# Patient Record
Sex: FEMALE | Race: BLACK OR AFRICAN AMERICAN | Employment: FULL TIME | URBAN - METROPOLITAN AREA
[De-identification: names, ages, dates, MRNs, and addresses within clinical notes are randomized per-mention and may not be internally consistent; named-entity substitution may affect disease eponyms.]

---

## 2017-01-17 ENCOUNTER — TELEPHONE (OUTPATIENT)
Dept: ONCOLOGY | Age: 22
End: 2017-01-17

## 2017-01-17 NOTE — TELEPHONE ENCOUNTER
Phone call placed to pt to remind pt to have labs drawn prior to her follow up appointment with . Pt verbalized understanding. Zoran Baker

## 2017-01-27 ENCOUNTER — OFFICE VISIT (OUTPATIENT)
Dept: ONCOLOGY | Age: 22
End: 2017-01-27

## 2017-01-27 VITALS
OXYGEN SATURATION: 98 % | HEIGHT: 61 IN | BODY MASS INDEX: 23.22 KG/M2 | HEART RATE: 101 BPM | SYSTOLIC BLOOD PRESSURE: 117 MMHG | TEMPERATURE: 97.8 F | RESPIRATION RATE: 20 BRPM | WEIGHT: 123 LBS | DIASTOLIC BLOOD PRESSURE: 70 MMHG

## 2017-01-27 DIAGNOSIS — D50.8 OTHER IRON DEFICIENCY ANEMIA: Primary | ICD-10-CM

## 2017-01-27 NOTE — PROGRESS NOTES
[address] [facility] at [address] [place] [address]  [phone]    Hematology / Oncology Followup    Reason for Visit:   Brennon Hernandez is a 24 y.o. female who is seen for follow up of anemia. History of Present Illness:   She reports continued fatigue, worsening since last here. Still with heavy periods, not taking her OCPs. Not able to tolerate oral iron. PAST HISTORY: The following sections were reviewed and updated in the EMR as appropriate: PMH, SH, FH, Medications, Allergies. Allergies   Allergen Reactions    Amoxicillin Rash     Skin peeling      Review of Systems: A complete review of systems was obtained, reviewed, and scanned into the EMR. Pertinent findings reviewed above. Physical Exam:     Visit Vitals    /70 (BP 1 Location: Left arm, BP Patient Position: Sitting)    Pulse (!) 101    Temp 97.8 °F (36.6 °C) (Temporal)    Resp 20    Ht 5' 1\" (1.549 m)    Wt 123 lb (55.8 kg)    SpO2 98%    BMI 23.24 kg/m2     General: No distress  Eyes: PERRLA, anicteric sclerae  HENT: Atraumatic, OP clear  Neck: Supple  Lymphatic: No cervical, supraclavicular, or inguinal adenopathy  Respiratory: CTAB, normal respiratory effort  CV: Normal rate, regular rhythm, no murmurs, no peripheral edema  GI: Soft, nontender, nondistended, no masses, no hepatomegaly, no splenomegaly  MS: Normal gait and station. Digits without clubbing or cyanosis. Skin: No rashes, ecchymoses, or petechiae. Normal temperature, turgor, and texture. Psych: Alert, oriented, appropriate affect, normal judgment/insight    Results:     Lab Results   Component Value Date/Time    WBC 4.0 09/27/2016 04:29 PM    HGB 12.9 09/27/2016 04:29 PM    HCT 41.2 09/27/2016 04:29 PM    PLATELET 537 18/84/0023 04:29 PM    MCV 69 09/27/2016 04:29 PM    ABS.  NEUTROPHILS 1.7 09/27/2016 04:29 PM     Lab Results   Component Value Date/Time    Sodium 141 07/05/2016 04:36 PM    Potassium 4.6 07/05/2016 04:36 PM    Chloride 103 07/05/2016 04:36 PM    CO2 24 07/05/2016 04:36 PM    Glucose 91 07/05/2016 04:36 PM    BUN 8 07/05/2016 04:36 PM    Creatinine 0.64 07/05/2016 04:36 PM    GFR est  07/05/2016 04:36 PM    GFR est non- 07/05/2016 04:36 PM    Calcium 9.5 07/05/2016 04:36 PM    Glucose (POC) 108 12/15/2013 07:26 AM     Lab Results   Component Value Date/Time    Bilirubin, total <0.2 07/05/2016 04:36 PM    ALT 10 07/05/2016 04:36 PM    AST 13 07/05/2016 04:36 PM    Alk. phosphatase 56 07/05/2016 04:36 PM    Protein, total 7.5 07/05/2016 04:36 PM    Albumin 4.0 07/05/2016 04:36 PM    Globulin 3.7 11/11/2014 12:25 PM     Lab Results   Component Value Date/Time    Reticulocyte count 0.7 09/27/2016 04:29 PM    Iron % saturation 50 09/27/2016 04:29 PM    Iron 125 09/27/2016 04:29 PM    TIBC 249 09/27/2016 04:29 PM    Ferritin 317 09/27/2016 04:29 PM    Haptoglobin 99 09/27/2016 04:29 PM     09/27/2016 04:29 PM    Sed rate (ESR) 59 10/20/2011 11:40 AM    TSH 0.919 07/05/2016 04:36 PM    Lipase 153 11/11/2014 12:25 PM     Lab Results   Component Value Date/Time    INR 1.1 12/16/2013 04:00 AM    aPTT 30.3 12/16/2013 04:00 AM    Factor VIII Activity 139 07/29/2016 12:17 PM    von Willebrand Factor (vWF) Ag 104 07/29/2016 12:17 PM    vWF Activity 71 07/29/2016 12:17 PM     Ferritin   Date Value   09/27/2016 317 ng/mL (H)   08/22/2016 565 ng/mL (H)   07/29/2016 7 NG/ML (L)       9/27/2016:  Hemoglobin fractionation: normal    1/25/2017:  HGB: 13.4  WBC: 5.7  PLT: 260  MCV: 71  Iron saturation: 31%  Ferritin: 79    US pelvis, US transvaginal 7/13/2016: normal      Assessment:   1) Anemia, iron deficiency  Resolved s/p IV iron. However, her microcytosis persists. Hemoglobin fractionation is negative for beta thal.  I suspect alpha thal trait, but at this point there is no indication for treatment an no need to send genetic testing to confirm. Smear is pending. She cannot tolerate oral iron.   Her iron deficiency is likely to recur.  Check labs again in 6-12 months. 2) Menorhagia  Improved with OCPs, but worsening now off this medication. VWF panel was negative. Consider resuming OCPs    3) Dizziness, fatigue  Symptoms persists. Not due to anemia, which has resolved. I encouraged her to follow up with her PCP to discuss other possible causes, she has an appointment next week.     Plan:     · Smear pending  · Labs in 6-12 months: CBC, iron profile, ferritin ([facility])  · Return to see me in 6-12 months      Signed By: Paxton Champion MD     January 27, 2017

## 2017-01-27 NOTE — MR AVS SNAPSHOT
Visit Information Date & Time Provider Department Dept. Phone Encounter #  
 1/27/2017  1:30 PM Emilia Moreira MD [address] at [address] [id number] Follow-up Instructions Return for Kaz iron deficiency fu. Your Appointments 1/30/2017  3:30 PM  
COMPLETE PHYSICAL with Augusto Echevarria NP [address] [facility] ([facility]) Appt Note: [address] [address] [phone]  
  
   
 [address] [address]  
  
    
 7/21/2017  1:30 PM  
ESTABLISHED PATIENT with Emilia Moreira MD  
[facility] at [facility]) Appt Note: f/U  
 [address] [place] [address]  
[phone]  
  
   
 [address] [facility] Upcoming Health Maintenance Date Due  
 HPV AGE 9Y-34Y (2 of 3 - Female 3 Dose Series) 12/9/2011 DTaP/Tdap/Td series (1 - Tdap) 6/14/2016 PAP AKA CERVICAL CYTOLOGY 6/14/2016 INFLUENZA AGE 9 TO ADULT 8/1/2016 Allergies as of 1/27/2017  Review Complete On: 1/27/2017 By: Sheila Crocker LPN Severity Noted Reaction Type Reactions Amoxicillin  11/02/2011    Rash Skin peeling Current Immunizations  Reviewed on 8/11/2016 Name Date Human Papillomavirus 10/14/2011 Meningococcal Vaccine 9/21/2010 Not reviewed this visit You Were Diagnosed With   
  
 Codes Comments Other iron deficiency anemia    -  Primary ICD-10-CM: D50.8 Vitals BP Pulse Temp Resp Height(growth percentile) 117/70 (BP 1 Location: Left arm, BP Patient Position: Sitting) (!) 101 97.8 °F (36.6 °C) (Temporal) 20 5' 1\" (1.549 m) Weight(growth percentile) SpO2 BMI OB Status Smoking Status 123 lb (55.8 kg) 98% 23.24 kg/m2 Having regular periods Never Smoker BMI and BSA Data  Body Mass Index Body Surface Area  
 23.24 kg/m 2 1.55 m 2  
  
  
 Preferred Pharmacy Pharmacy Name Phone [facility] [address], [address] [place] [address] Surendra Parisi [phone] Your Updated Medication List  
  
   
This list is accurate as of: 1/27/17  2:37 PM.  Always use your most recent med list.  
  
  
  
  
 diclofenac EC 75 mg EC tablet Commonly known as:  VOLTAREN Take 1 Tab by mouth two (2) times a day. ergocalciferol 50,000 unit capsule Commonly known as:  ERGOCALCIFEROL Take 1 Cap by mouth every seven (7) days. norgestimate-ethinyl estradiol 0.25-35 mg-mcg Tab Commonly known as:  Celina  Take 1 Tab by mouth daily. We Performed the Following CBC W/O DIFF [17782 CPT(R)] FERRITIN [36130 CPT(R)] IRON PROFILE [id number] CPT(R)] Follow-up Instructions Return for Kaz iron deficiency fu. Introducing [facility]! Dear Meena Nguyễn: Thank you for requesting a [email or web address] account. Our records indicate that you already have an active [email or web address] account. You can access your account anytime at [email or web address] Did you know that you can access your hospital and ER discharge instructions at any time in [email or web address]? You can also review all of your test results from your hospital stay or ER visit. Additional Information If you have questions, please visit the Frequently Asked Questions section of the [email or web address] website at [email or web address] Remember, [email or web address] is NOT to be used for urgent needs. For medical emergencies, dial 911. Now available from your iPhone and Android! Please provide this summary of care documentation to your next provider. Your primary care clinician is listed as KM PATE. If you have any questions after today's visit, please call [phone].

## 2017-02-10 ENCOUNTER — OFFICE VISIT (OUTPATIENT)
Dept: FAMILY MEDICINE CLINIC | Age: 22
End: 2017-02-10

## 2017-02-10 VITALS
TEMPERATURE: 99 F | SYSTOLIC BLOOD PRESSURE: 119 MMHG | HEART RATE: 106 BPM | HEIGHT: 61 IN | RESPIRATION RATE: 18 BRPM | WEIGHT: 122 LBS | BODY MASS INDEX: 23.03 KG/M2 | OXYGEN SATURATION: 99 % | DIASTOLIC BLOOD PRESSURE: 84 MMHG

## 2017-02-10 DIAGNOSIS — Z13.29 SCREENING FOR THYROID DISORDER: ICD-10-CM

## 2017-02-10 DIAGNOSIS — Z98.890 S/P MVR (MITRAL VALVE REPAIR): ICD-10-CM

## 2017-02-10 DIAGNOSIS — Z00.00 ANNUAL PHYSICAL EXAM: Primary | ICD-10-CM

## 2017-02-10 DIAGNOSIS — F41.1 GAD (GENERALIZED ANXIETY DISORDER): ICD-10-CM

## 2017-02-10 RX ORDER — ESCITALOPRAM OXALATE 10 MG/1
10 TABLET ORAL DAILY
Qty: 30 TAB | Refills: 1 | Status: SHIPPED | OUTPATIENT
Start: 2017-02-10 | End: 2017-04-11 | Stop reason: SDUPTHER

## 2017-02-10 RX ORDER — CLONAZEPAM 0.5 MG/1
0.5 TABLET ORAL
Qty: 15 TAB | Refills: 0 | Status: SHIPPED | OUTPATIENT
Start: 2017-02-10 | End: 2018-11-29

## 2017-02-10 NOTE — PROGRESS NOTES
Chief Complaint   Patient presents with    Physical    Labs     Patient in office today for physical and labs; pt states she was taken to [facility] on Tuesday for SOB and chest pain; was dx with anxiety attack. Xray and EKG was done came back normal.    1. Have you been to the ER, urgent care clinic since your last visit? Hospitalized since your last visit?see note    2. Have you seen or consulted any other health care providers outside of the [address] [place] [address] since your last visit? Include any pap smears or colon screening.  No

## 2017-02-10 NOTE — MR AVS SNAPSHOT
Visit Information Date & Time Provider Department Dept. Phone Encounter #  
 2/10/2017  3:30 PM Ariadna [place] NP [address] [facility] [phone] [id number] Follow-up Instructions Return in about 1 month (around 3/10/2017) for follow up. Your Appointments 7/21/2017  1:30 PM  
ESTABLISHED PATIENT with Jose Ordoñez MD  
[facility] at [address] [place] [address] [facility] Appt Note: f/U  
 [address] [facility] [address] [place] [address]  
[phone]  
  
   
 [address] [facility] [address], [address] [facility] Upcoming Health Maintenance Date Due  
 HPV AGE 9Y-34Y (2 of 3 - Female 3 Dose Series) 12/9/2011 DTaP/Tdap/Td series (1 - Tdap) 6/14/2016 PAP AKA CERVICAL CYTOLOGY 6/14/2016 INFLUENZA AGE 9 TO ADULT 8/1/2016 Allergies as of 2/10/2017  Review Complete On: 2/10/2017 By: Ariadna [place] NP Severity Noted Reaction Type Reactions Amoxicillin  11/02/2011    Rash Skin peeling Current Immunizations  Reviewed on 8/11/2016 Name Date Human Papillomavirus 10/14/2011 Meningococcal Vaccine 9/21/2010 Not reviewed this visit You Were Diagnosed With   
  
 Codes Comments Annual physical exam    -  Primary ICD-10-CM: Z00.00 ICD-9-CM: V70.0 S/P MVR (mitral valve repair)     ICD-10-CM: E64.341 ICD-9-CM: V45.89 MEDHAT (generalized anxiety disorder)     ICD-10-CM: F41.1 ICD-9-CM: 300.02 Screening for thyroid disorder     ICD-10-CM: Z13.29 ICD-9-CM: V77.0 Vitals BP Pulse Temp Resp Height(growth percentile) Weight(growth percentile) 119/84 (BP 1 Location: Left arm, BP Patient Position: Sitting) (!) 106 99 °F (37.2 °C) (Oral) 18 5' 1\" (1.549 m) 122 lb (55.3 kg) LMP SpO2 BMI OB Status Smoking Status 02/05/2017 99% 23.05 kg/m2 Having regular periods Never Smoker BMI and BSA Data Body Mass Index Body Surface Area  23.05 kg/m 2 1.54 m 2  
  
  
 Preferred Pharmacy Pharmacy Name Phone [facility] [address], [address] [facility] [place] [phone] Your Updated Medication List  
  
   
This list is accurate as of: 2/10/17  3:48 PM.  Always use your most recent med list.  
  
  
  
  
 clonazePAM 0.5 mg tablet Commonly known as:  Augie Padmini Take 1 Tab by mouth daily as needed. escitalopram oxalate 10 mg tablet Commonly known as:  Max Bias Take 1 Tab by mouth daily. Prescriptions Printed Refills  
 clonazePAM (KLONOPIN) 0.5 mg tablet 0 Sig: Take 1 Tab by mouth daily as needed. Class: Print Route: Oral  
  
Prescriptions Sent to Pharmacy Refills  
 escitalopram oxalate (LEXAPRO) 10 mg tablet 1 Sig: Take 1 Tab by mouth daily. Class: Normal  
 Pharmacy: [address] [facility], [address] [facility] [place] Ph #: [phone] Route: Oral  
  
We Performed the Following CBC WITH AUTOMATED DIFF [60462 CPT(R)] LIPID PANEL [94266 CPT(R)] METABOLIC PANEL, COMPREHENSIVE [20546 CPT(R)] TSH 3RD GENERATION [96974 CPT(R)] Follow-up Instructions Return in about 1 month (around 3/10/2017) for follow up. Patient Instructions Benzodiazepines: Potential side effects of benzodiazepine medications include, but are not limited to, the possibility of \"paradoxical agitation\" with irritability, aggressiveness or stimulated behavior; clumsiness, slurring of speech, dulled facies, psychomotor impairment, anterograde amnesia, impaired awareness of degree of drug effect, visual and hearing sensitivity impairment, other psychiatric/behavioral disturbances, impacts operating certain machinery or engaging in certain activities or employment, anxiety, insomnia, anorexia, tremor, nausea, vomiting, diarrhea and potential to develop tolerance, dependence, addiction and death from overdose. Use caution while taking benzodiazepines. There is a potential to overdose on this medication when too many pills are taken at one time. Do not mix alcohol with this medication because it can worsen side effects and be very dangerous. Escitalopram (By mouth) Escitalopram (sz-[id number]-VXU-oh-pram) Treats depression and generalized anxiety disorder (MEDHAT). Brand Name(s):Lexapro There may be other brand names for this medicine. When This Medicine Should Not Be Used: This medicine is not right for everyone. Do not use it if you had an allergic reaction to escitalopram or citalopram. 
How to Use This Medicine:  
Liquid, Tablet · Take this medicine as directed. You may need to take it for a month or more before you feel better. Your dose may need to be changed to find out what works best for you. · Measure the oral liquid medicine with a marked measuring spoon, oral syringe, or medicine cup. · This medicine should come with a Medication Guide. Ask your pharmacist for a copy if you do not have one. · Missed dose: Take a dose as soon as you remember. If it is almost time for your next dose, wait until then and take a regular dose. Do not take extra medicine to make up for a missed dose. · Store the medicine in a closed container at room temperature, away from heat, moisture, and direct light. Drugs and Foods to Avoid: Ask your doctor or pharmacist before using any other medicine, including over-the-counter medicines, vitamins, and herbal products. · Do not use this medicine together with pimozide. Do not use this medicine and an MAO inhibitor (MAOI) within 14 days of each other. · Some medicines can affect how escitalopram works. Tell your doctor if you are using the following:  
¨ Buspirone, carbamazepine, cimetidine, fentanyl, lithium, Jeana's wort, tramadol, or tryptophan supplements ¨ An NSAID pain or arthritis medicine (such as aspirin, diclofenac, ibuprofen, naproxen), triptan medicine to treat migraine headaches, a blood thinner (such as warfarin), or a diuretic (water pill) · Do not drink alcohol while you are using this medicine. Warnings While Using This Medicine: · Tell your doctor if you are pregnant or breastfeeding, or if you have kidney disease, liver disease, bleeding problems, glaucoma, heart disease, or a seizure disorder. · For some children, teenagers, and young adults, this medicine may increase mental or emotional problems. This may lead to thoughts of suicide and violence. Talk with your doctor right away if you have any thoughts or behavior changes that concern you. Tell your doctor if you or anyone in your family has a history of bipolar disorder or suicide attempts. · This medicine may cause the following problems:  
¨ Serotonin syndrome (more likely when taken with certain medicines) ¨ Low sodium levels ¨ Increased risk of bleeding problems · This medicine may make you dizzy or drowsy. Do not drive or do anything that could be dangerous until you know how this medicine affects you. · Your doctor may want to monitor your child's weight and height, because this medicine may cause decreased appetite and weight loss in children. · Do not stop using this medicine suddenly. Your doctor will need to slowly decrease your dose before you stop it completely. · Your doctor will check your progress and the effects of this medicine at regular visits. Keep all appointments. · Keep all medicine out of the reach of children. Never share your medicine with anyone. Possible Side Effects While Using This Medicine:  
Call your doctor right away if you notice any of these side effects: · Allergic reaction: Itching or hives, swelling in your face or hands, swelling or tingling in your mouth or throat, chest tightness, trouble breathing · Anxiety, restlessness, fever, sweating, muscle spasms, nausea, vomiting, diarrhea, seeing or hearing things that are not there · Confusion, weakness, and muscle twitching · Fast, pounding, or uneven heartbeat · Feeling more excited or energetic than usual, racing thoughts, trouble sleeping · Eye pain, vision changes, seeing halos around lights · Seizures · Thoughts of hurting yourself or others, unusual behavior · Unusual bleeding or bruising If you notice these less serious side effects, talk with your doctor: · Dizziness, drowsiness, or sleepiness · Dry mouth 
· Headache · Nausea, constipation, diarrhea · Sexual problems If you notice other side effects that you think are caused by this medicine, tell your doctor. Call your doctor for medical advice about side effects. You may report side effects to FDA at 9-349-HGD-3120 © [address] is for End User's use only and may not be sold, redistributed or otherwise used for commercial purposes. The above information is an  only. It is not intended as medical advice for individual conditions or treatments. Talk to your doctor, nurse or pharmacist before following any medical regimen to see if it is safe and effective for you. Introducing [facility]! Dear Lynn Finch: Thank you for requesting a Elastera account. Our records indicate that you already have an active Elastera account. You can access your account anytime at [email or web address] Did you know that you can access your hospital and ER discharge instructions at any time in Elastera? You can also review all of your test results from your hospital stay or ER visit. Additional Information If you have questions, please visit the Frequently Asked Questions section of the Elastera website at [email or web address] Remember, Elastera is NOT to be used for urgent needs. For medical emergencies, dial 911. Now available from your iPhone and Android! Please provide this summary of care documentation to your next provider. Your primary care clinician is listed as KM PATE. If you have any questions after today's visit, please call [phone].

## 2017-02-10 NOTE — PATIENT INSTRUCTIONS
Benzodiazepines: Potential side effects of benzodiazepine medications include, but are not limited to, the possibility of \"paradoxical agitation\" with irritability, aggressiveness or stimulated behavior; clumsiness, slurring of speech, dulled facies, psychomotor impairment, anterograde amnesia, impaired awareness of degree of drug effect, visual and hearing sensitivity impairment, other psychiatric/behavioral disturbances, impacts operating certain machinery or engaging in certain activities or employment, anxiety, insomnia, anorexia, tremor, nausea, vomiting, diarrhea and potential to develop tolerance, dependence, addiction and death from overdose. Use caution while taking benzodiazepines. There is a potential to overdose on this medication when too many pills are taken at one time. Do not mix alcohol with this medication because it can worsen side effects and be very dangerous. Escitalopram (By mouth)   Escitalopram ([id number])  Treats depression and generalized anxiety disorder (MEDHAT). Brand Name(s):Lexapro   There may be other brand names for this medicine. When This Medicine Should Not Be Used: This medicine is not right for everyone. Do not use it if you had an allergic reaction to escitalopram or citalopram.  How to Use This Medicine:   Liquid, Tablet  · Take this medicine as directed. You may need to take it for a month or more before you feel better. Your dose may need to be changed to find out what works best for you. · Measure the oral liquid medicine with a marked measuring spoon, oral syringe, or medicine cup. · This medicine should come with a Medication Guide. Ask your pharmacist for a copy if you do not have one. · Missed dose: Take a dose as soon as you remember. If it is almost time for your next dose, wait until then and take a regular dose. Do not take extra medicine to make up for a missed dose.   · Store the medicine in a closed container at room temperature, away from heat, moisture, and direct light. Drugs and Foods to Avoid:   Ask your doctor or pharmacist before using any other medicine, including over-the-counter medicines, vitamins, and herbal products. · Do not use this medicine together with pimozide. Do not use this medicine and an MAO inhibitor (MAOI) within 14 days of each other. · Some medicines can affect how escitalopram works. Tell your doctor if you are using the following:   ¨ Buspirone, carbamazepine, cimetidine, fentanyl, lithium, Jeana's wort, tramadol, or tryptophan supplements  ¨ An NSAID pain or arthritis medicine (such as aspirin, diclofenac, ibuprofen, naproxen), triptan medicine to treat migraine headaches, a blood thinner (such as warfarin), or a diuretic (water pill)  · Do not drink alcohol while you are using this medicine. Warnings While Using This Medicine:   · Tell your doctor if you are pregnant or breastfeeding, or if you have kidney disease, liver disease, bleeding problems, glaucoma, heart disease, or a seizure disorder. · For some children, teenagers, and young adults, this medicine may increase mental or emotional problems. This may lead to thoughts of suicide and violence. Talk with your doctor right away if you have any thoughts or behavior changes that concern you. Tell your doctor if you or anyone in your family has a history of bipolar disorder or suicide attempts. · This medicine may cause the following problems:   ¨ Serotonin syndrome (more likely when taken with certain medicines)  ¨ Low sodium levels  ¨ Increased risk of bleeding problems  · This medicine may make you dizzy or drowsy. Do not drive or do anything that could be dangerous until you know how this medicine affects you. · Your doctor may want to monitor your child's weight and height, because this medicine may cause decreased appetite and weight loss in children. · Do not stop using this medicine suddenly.  Your doctor will need to slowly decrease your dose before you stop it completely. · Your doctor will check your progress and the effects of this medicine at regular visits. Keep all appointments. · Keep all medicine out of the reach of children. Never share your medicine with anyone. Possible Side Effects While Using This Medicine:   Call your doctor right away if you notice any of these side effects:  · Allergic reaction: Itching or hives, swelling in your face or hands, swelling or tingling in your mouth or throat, chest tightness, trouble breathing  · Anxiety, restlessness, fever, sweating, muscle spasms, nausea, vomiting, diarrhea, seeing or hearing things that are not there  · Confusion, weakness, and muscle twitching  · Fast, pounding, or uneven heartbeat  · Feeling more excited or energetic than usual, racing thoughts, trouble sleeping  · Eye pain, vision changes, seeing halos around lights  · Seizures  · Thoughts of hurting yourself or others, unusual behavior  · Unusual bleeding or bruising  If you notice these less serious side effects, talk with your doctor:   · Dizziness, drowsiness, or sleepiness  · Dry mouth  · Headache  · Nausea, constipation, diarrhea  · Sexual problems  If you notice other side effects that you think are caused by this medicine, tell your doctor. Call your doctor for medical advice about side effects. You may report side effects to FDA at 1-354-FDA-0279  © 2016 [address] is for End User's use only and may not be sold, redistributed or otherwise used for commercial purposes. The above information is an  only. It is not intended as medical advice for individual conditions or treatments. Talk to your doctor, nurse or pharmacist before following any medical regimen to see if it is safe and effective for you.

## 2017-02-11 LAB
ALBUMIN SERPL-MCNC: 4.3 G/DL (ref 3.5–5.5)
ALBUMIN/GLOB SERPL: 1.3 {RATIO} (ref 1.1–2.5)
ALP SERPL-CCNC: 72 IU/L (ref 39–117)
ALT SERPL-CCNC: 10 IU/L (ref 0–32)
AST SERPL-CCNC: 14 IU/L (ref 0–40)
BASOPHILS # BLD AUTO: 0 X10E3/UL (ref 0–0.2)
BASOPHILS NFR BLD AUTO: 1 %
BILIRUB SERPL-MCNC: 0.3 MG/DL (ref 0–1.2)
BUN SERPL-MCNC: 10 MG/DL (ref 6–20)
BUN/CREAT SERPL: 16 (ref 8–20)
CALCIUM SERPL-MCNC: 9.6 MG/DL (ref 8.7–10.2)
CHLORIDE SERPL-SCNC: 99 MMOL/L (ref 96–106)
CHOLEST SERPL-MCNC: 187 MG/DL (ref 100–199)
CO2 SERPL-SCNC: 23 MMOL/L (ref 18–29)
CREAT SERPL-MCNC: 0.63 MG/DL (ref 0.57–1)
EOSINOPHIL # BLD AUTO: 0.1 X10E3/UL (ref 0–0.4)
EOSINOPHIL NFR BLD AUTO: 3 %
ERYTHROCYTE [DISTWIDTH] IN BLOOD BY AUTOMATED COUNT: 14.4 % (ref 12.3–15.4)
GLOBULIN SER CALC-MCNC: 3.2 G/DL (ref 1.5–4.5)
GLUCOSE SERPL-MCNC: 89 MG/DL (ref 65–99)
HCT VFR BLD AUTO: 41.3 % (ref 34–46.6)
HDLC SERPL-MCNC: 59 MG/DL
HGB BLD-MCNC: 13.5 G/DL (ref 11.1–15.9)
IMM GRANULOCYTES # BLD: 0 X10E3/UL (ref 0–0.1)
IMM GRANULOCYTES NFR BLD: 0 %
INTERPRETATION, 910389: NORMAL
LDLC SERPL CALC-MCNC: 120 MG/DL (ref 0–99)
LYMPHOCYTES # BLD AUTO: 1.3 X10E3/UL (ref 0.7–3.1)
LYMPHOCYTES NFR BLD AUTO: 33 %
MCH RBC QN AUTO: 22.7 PG (ref 26.6–33)
MCHC RBC AUTO-ENTMCNC: 32.7 G/DL (ref 31.5–35.7)
MCV RBC AUTO: 69 FL (ref 79–97)
MONOCYTES # BLD AUTO: 0.3 X10E3/UL (ref 0.1–0.9)
MONOCYTES NFR BLD AUTO: 8 %
NEUTROPHILS # BLD AUTO: 2.1 X10E3/UL (ref 1.4–7)
NEUTROPHILS NFR BLD AUTO: 55 %
PLATELET # BLD AUTO: 260 X10E3/UL (ref 150–379)
POTASSIUM SERPL-SCNC: 4.5 MMOL/L (ref 3.5–5.2)
PROT SERPL-MCNC: 7.5 G/DL (ref 6–8.5)
RBC # BLD AUTO: 5.96 X10E6/UL (ref 3.77–5.28)
SODIUM SERPL-SCNC: 140 MMOL/L (ref 134–144)
TRIGL SERPL-MCNC: 40 MG/DL (ref 0–149)
TSH SERPL DL<=0.005 MIU/L-ACNC: 0.67 UIU/ML (ref 0.45–4.5)
VLDLC SERPL CALC-MCNC: 8 MG/DL (ref 5–40)
WBC # BLD AUTO: 3.9 X10E3/UL (ref 3.4–10.8)

## 2017-02-13 NOTE — PROGRESS NOTES
The following message was sent to pt via [employer] portal in reference to lab results:    Good morning Ms. Laz Casey are the results of your most recent lab work. I have the following recommendations:    1. Your CBC which looks at your white blood cells, red blood cells, and hemoglobin came back looking normal. No sign of infection or anemia. 2. Your metabolic panel which looks at your blood glucose, liver function, and kidney function looks perfect. 3. Your cholesterol is elevated. Your LDL or \"bad cholesterol\" is too high. I would like for you to make some diet and lifestyle changes to improve this and follow up in 3 months to recheck it. The BEST way to lower cholesterol is to follow a strict diet that is low fat combined with regular exercise. Here are a few tips on how to do this:  - Avoid foods that are high in saturated fats (especially fried foods)  - Replace butter with margarine  - Eat lots of fresh fruits and vegetables  - Choose fish, chicken, and turkey as your serving of meat  - Try to avoid too many processed foods  - Choose non fat milk  - Use whole wheat bread  You should also try and do 30 minutes of aerobic exercise most days of the week. All of these will contribute to lowering your cholesterol and decrease your risk of heart disease. 4. Your TSH which screens for thyroid disease came back normal. This means you do not have hyper or hypothyroidism. Lets recheck these labs in 3 months, fasting.  Please do not hesitate to call me or schedule an appointment to be seen if you need anything else in the meantime :)    MURIEL Richardson

## 2017-02-15 ENCOUNTER — OFFICE VISIT (OUTPATIENT)
Dept: CARDIOLOGY CLINIC | Age: 22
End: 2017-02-15

## 2017-02-15 VITALS
SYSTOLIC BLOOD PRESSURE: 112 MMHG | OXYGEN SATURATION: 98 % | HEIGHT: 61 IN | DIASTOLIC BLOOD PRESSURE: 78 MMHG | HEART RATE: 93 BPM | BODY MASS INDEX: 23.11 KG/M2 | WEIGHT: 122.4 LBS

## 2017-02-15 DIAGNOSIS — Z98.890 S/P MVR (MITRAL VALVE REPAIR): ICD-10-CM

## 2017-02-15 DIAGNOSIS — R07.9 CHEST PAIN, UNSPECIFIED TYPE: ICD-10-CM

## 2017-02-15 DIAGNOSIS — Z98.890 H/O MITRAL VALVE REPAIR: Primary | ICD-10-CM

## 2017-02-15 DIAGNOSIS — D50.8 OTHER IRON DEFICIENCY ANEMIA: ICD-10-CM

## 2017-02-15 DIAGNOSIS — I34.0 NON-RHEUMATIC MITRAL REGURGITATION: Primary | ICD-10-CM

## 2017-02-15 NOTE — PROGRESS NOTES
LAST OFFICE VISIT : Visit date not found        ICD-10-CM ICD-9-CM   1. Non-rheumatic mitral regurgitation I34.0 424.0   2. S/P MVR (mitral valve repair) Z98.890 V45.89   3. Other iron deficiency anemia D50.8      Valery Prader is a 24 y.o. female with non-rheumatic mitral regurgitation, s/p MVR referred for routine follow up. Cardiac risk factors: none. I have personally obtained the history from the patient. HISTORY OF PRESENTING ILLNESS      Ms. Wendy Carlos notes recurrent intermittent chest discomfort in the past 3-4 weeks that occurs mostly at rest. She was evaluated at the ED for this and had a negative cardiac work up. She notes associated fatigue that keeps her fairly sedentary. she denies any associated syncopal or near syncopal episodes. She denies any lightheadedness or dizziness. She denies cocaine use. She is not on birth control, nor does she think that she may be pregnant. The patient denies shortness of breath, orthopnea, PND, LE edema, palpitations, syncope, presyncope or fatigue.          ACTIVE PROBLEM LIST     Patient Active Problem List    Diagnosis Date Noted    S/P MVR (mitral valve repair) 12/13/2013    Mitral regurgitation 12/02/2013    Anemia, iron deficiency 01/16/2012    Scoliosis 12/07/2010           PAST MEDICAL HISTORY     Past Medical History   Diagnosis Date    Anemia     Murmur     Valvular heart disease            PAST SURGICAL HISTORY     Past Surgical History   Procedure Laterality Date    Pr cardiac surg procedure unlist            ALLERGIES     Allergies   Allergen Reactions    Amoxicillin Rash     Skin peeling          FAMILY HISTORY     Family History   Problem Relation Age of Onset    Cancer Father      LYMPHOMA    Heart Disease Father     Other Mother     Arthritis-osteo Mother     Kidney Disease Mother      ANKYLOSING SPONDYLITIS    Diabetes Maternal Grandmother     Hypertension Maternal Grandmother     Kidney Disease Maternal Grandmother     Cancer Maternal Aunt      LUNG CA - SMOKER    Other Maternal Aunt      SARCOIDOSIS    Anesth Problems Neg Hx     negative for cardiac disease       SOCIAL HISTORY     Social History     Social History    Marital status: SINGLE     Spouse name: N/A    Number of children: N/A    Years of education: N/A     Social History Main Topics    Smoking status: Never Smoker    Smokeless tobacco: Never Used    Alcohol use No    Drug use: No    Sexual activity: No     Other Topics Concern    Not on file     Social History Narrative         MEDICATIONS     Current Outpatient Prescriptions   Medication Sig    escitalopram oxalate (LEXAPRO) 10 mg tablet Take 1 Tab by mouth daily.  clonazePAM (KLONOPIN) 0.5 mg tablet Take 1 Tab by mouth daily as needed. No current facility-administered medications for this visit. I have reviewed the nurses notes, vitals, problem list, allergy list, medical history, family, social history and medications. REVIEW OF SYMPTOMS      General: Pt denies excessive weight gain or loss. Pt is able to conduct ADL'sPositive for fatigue. HEENT: Denies blurred vision, headaches, hearing loss, epistaxis and difficulty swallowing. Respiratory: Denies cough, congestion, shortness of breath, WONG, wheezing or stridor. Cardiovascular: Denies precordial pain, palpitations, edema or PND. Positive for chest discomfort. Gastrointestinal: Denies poor appetite, indigestion, abdominal pain or blood in stool  Genitourinary: Denies hematuria, dysuria, increased urinary frequency  Musculoskeletal: Denies joint pain or swelling from muscles or joints  Neurologic: Denies tremor, paresthesias, headache, or sensory motor disturbance  Psychiatric: Denies confusion, insomnia, depression  Integumentray: Denies rash, itching or ulcers. Hematologic: Denies easy bruising, bleeding     PHYSICAL EXAMINATION      There were no vitals filed for this visit.   General: Well developed, in no acute distress. HEENT: No jaundice, oral mucosa moist, no oral ulcers  Neck: Supple, no stiffness, no lymphadenopathy, supple  Heart:  Normal S1/S2 negative S3 or S4. Regular, no murmur, gallop or rub, no jugular venous distention  Respiratory: Clear bilaterally x 4, no wheezing or rales  Extremities:  No edema, normal cap refill, no cyanosis. Musculoskeletal: No clubbing, no deformities  Neuro: A&Ox3, speech clear, gait stable, cooperative, no focal neurologic deficits  Skin: Skin color is normal. No rashes or lesions. Non diaphoretic, moist.  Vascular: 2+ pulses symmetric in all extremities    EKG 02/15/17- Normal sinus rhythm. DIAGNOSTIC DATA     1. Echocardiograms (3/27/12)   SUMMARY:  Left ventricle: Systolic function was normal. Ejection fraction was  estimated in the range of 55 % to 60 %. There were no regional wall motion  Abnormalities.         SUMMARY: (11/18/13)  Left ventricle: Systolic function was normal. Ejection fraction was  estimated in the range of 60 % to 65 %. There were no regional wall motion  abnormalities. Mitral valve: Flail anterior mitral valve leaflet. There was a marked  prolapse involving the medial segment of the anterior leaflet. There was  moderate to severe regurgitation. Tricuspid valve: There was mild regurgitation.    (3/5/14)  SUMMARY:  Left ventricle: Systolic function was normal. Ejection fraction was  estimated in the range of 55 % to 60 %. There were no regional wall motion  abnormalities. Mitral valve: Unable to get mean gradient across the MV due to tachy heart  rate. Tricuspid valve: There was mild regurgitation. 2. Lipids  2/10/17- , HDL 59, , TG 40    3.  Holter  3/5/14- ST        LABORATORY DATA            Lab Results   Component Value Date/Time    WBC 3.9 02/10/2017 03:41 PM    HGB 13.5 02/10/2017 03:41 PM    HCT 41.3 02/10/2017 03:41 PM    PLATELET 776 62/81/8567 03:41 PM    MCV 69 02/10/2017 03:41 PM      Lab Results   Component Value Date/Time    Sodium 140 02/10/2017 03:41 PM    Potassium 4.5 02/10/2017 03:41 PM    Chloride 99 02/10/2017 03:41 PM    CO2 23 02/10/2017 03:41 PM    Anion gap 7 11/11/2014 12:25 PM    Glucose 89 02/10/2017 03:41 PM    BUN 10 02/10/2017 03:41 PM    Creatinine 0.63 02/10/2017 03:41 PM    BUN/Creatinine ratio 16 02/10/2017 03:41 PM    GFR est  02/10/2017 03:41 PM    GFR est non- 02/10/2017 03:41 PM    Calcium 9.6 02/10/2017 03:41 PM    Bilirubin, total 0.3 02/10/2017 03:41 PM    AST (SGOT) 14 02/10/2017 03:41 PM    Alk. phosphatase 72 02/10/2017 03:41 PM    Protein, total 7.5 02/10/2017 03:41 PM    Albumin 4.3 02/10/2017 03:41 PM    Globulin 3.7 11/11/2014 12:25 PM    A-G Ratio 1.3 02/10/2017 03:41 PM    ALT (SGPT) 10 02/10/2017 03:41 PM           ASSESSMENT/RECOMMENDATIONS:.      1. Chest discomfort  -will arrange for cardiac MRI. I do not suspect this is cardiac in origin, sounds more MS. carter not on brith control do not believe this is pulmonary emboli. Will check D-dimer. 2. Non-rheumatic mitral regurgitation s/p valve repair 2014  -Will get cardiac MRI to see struturally how the repair is functioning. 3. Tachycardia  -Rate is midly elevated now and has been consistently elevated since her surgery. Will consider betablockers in the future. 3. Return after results. No orders of the defined types were placed in this encounter. Follow-up Disposition: Not on File      I have discussed the diagnosis with  Aristides Hayden and the intended plan as seen in the above orders. Questions were answered concerning future plans. I have discussed medication side effects and warnings with the patient as well. Thank you,  Sally Lawler MD for involving me in the care of  Aristides Hayden. Please do not hesitate to contact me for further questions/concerns. Written by Rinu Orvel Galeazzi, as dictated by Salena Curling, MD.      Debbie Lo MD, [facility] & [address] [facility], [address] [place] [address]     [address]      [phone] / [phone] Fax

## 2017-02-16 LAB — D DIMER PPP FEU-MCNC: 0.28 MG/L FEU (ref 0–0.49)

## 2017-03-01 ENCOUNTER — OFFICE VISIT (OUTPATIENT)
Dept: CARDIOLOGY CLINIC | Age: 22
End: 2017-03-01

## 2017-03-01 ENCOUNTER — HOSPITAL ENCOUNTER (OUTPATIENT)
Dept: MRI IMAGING | Age: 22
Discharge: HOME | End: 2017-03-01
Attending: SPECIALIST
Payer: COMMERCIAL

## 2017-03-01 VITALS
DIASTOLIC BLOOD PRESSURE: 68 MMHG | OXYGEN SATURATION: 98 % | HEIGHT: 61 IN | WEIGHT: 123 LBS | SYSTOLIC BLOOD PRESSURE: 114 MMHG | BODY MASS INDEX: 23.22 KG/M2 | HEART RATE: 114 BPM | RESPIRATION RATE: 14 BRPM

## 2017-03-01 DIAGNOSIS — Z98.890 H/O MITRAL VALVE REPAIR: ICD-10-CM

## 2017-03-01 DIAGNOSIS — D50.8 OTHER IRON DEFICIENCY ANEMIA: ICD-10-CM

## 2017-03-01 DIAGNOSIS — Z98.890 S/P MVR (MITRAL VALVE REPAIR): ICD-10-CM

## 2017-03-01 DIAGNOSIS — I34.0 NON-RHEUMATIC MITRAL REGURGITATION: Primary | ICD-10-CM

## 2017-03-01 PROCEDURE — 75557 CARDIAC MRI FOR MORPH: CPT

## 2017-03-01 NOTE — PROGRESS NOTES
LAST OFFICE VISIT : 2/15/2017        ICD-10-CM ICD-9-CM   1. Non-rheumatic mitral regurgitation I34.0 424.0   2. S/P MVR (mitral valve repair) Z98.890 V45.89   3. Other iron deficiency anemia D50.8             Cheyanne Jones is a 24 y.o. female with non-rheumatic mitral regurgitation s/p MVR referred for      Cardiac risk factors: none  I have personally obtained the history from the patient. HISTORY OF PRESENTING ILLNESS       Ms. Tiffanie Reinoso continues to experience chest discomfort, both with rest and activity. She had her MRI earlier today, awaiting results. She continues to hydrate herself. She denies any lightheadedness or dizziness. The patient denies shortness of breath, orthopnea, PND, LE edema, palpitations, syncope, presyncope or fatigue.        ACTIVE PROBLEM LIST     Patient Active Problem List    Diagnosis Date Noted    S/P MVR (mitral valve repair) 12/13/2013    Mitral regurgitation 12/02/2013    Anemia, iron deficiency 01/16/2012    Scoliosis 12/07/2010           PAST MEDICAL HISTORY     Past Medical History:   Diagnosis Date    Anemia     Murmur     Valvular heart disease            PAST SURGICAL HISTORY     Past Surgical History:   Procedure Laterality Date    CARDIAC SURG PROCEDURE UNLIST            ALLERGIES     Allergies   Allergen Reactions    Amoxicillin Rash     Skin peeling          FAMILY HISTORY     Family History   Problem Relation Age of Onset    Cancer Father      LYMPHOMA    Heart Disease Father     Other Mother     Arthritis-osteo Mother     Kidney Disease Mother      ANKYLOSING SPONDYLITIS    Diabetes Maternal Grandmother     Hypertension Maternal Grandmother     Kidney Disease Maternal Grandmother     Cancer Maternal Aunt      LUNG CA - SMOKER    Other Maternal Aunt      SARCOIDOSIS    Anesth Problems Neg Hx     negative for cardiac disease       SOCIAL HISTORY     Social History     Social History    Marital status: SINGLE     Spouse name: N/A    Number of children: N/A    Years of education: N/A     Social History Main Topics    Smoking status: Never Smoker    Smokeless tobacco: Never Used    Alcohol use No      Comment: rarely    Drug use: No    Sexual activity: No     Other Topics Concern    Not on file     Social History Narrative         MEDICATIONS     Current Outpatient Prescriptions   Medication Sig    escitalopram oxalate (LEXAPRO) 10 mg tablet Take 1 Tab by mouth daily.  clonazePAM (KLONOPIN) 0.5 mg tablet Take 1 Tab by mouth daily as needed. No current facility-administered medications for this visit. I have reviewed the nurses notes, vitals, problem list, allergy list, medical history, family, social history and medications. REVIEW OF SYMPTOMS      General: Pt denies excessive weight gain or loss. Pt is able to conduct ADL's  HEENT: Denies blurred vision, headaches, hearing loss, epistaxis and difficulty swallowing. Respiratory: Denies cough, congestion, shortness of breath, WONG, wheezing or stridor. Cardiovascular: Denies precordial pain, palpitations, edema or PND. Positive for chest pain. Gastrointestinal: Denies poor appetite, indigestion, abdominal pain or blood in stool  Genitourinary: Denies hematuria, dysuria, increased urinary frequency  Musculoskeletal: Denies joint pain or swelling from muscles or joints  Neurologic: Denies tremor, paresthesias, headache, or sensory motor disturbance  Psychiatric: Denies confusion, insomnia, depression  Integumentray: Denies rash, itching or ulcers. Hematologic: Denies easy bruising, bleeding     PHYSICAL EXAMINATION      Vitals:    03/01/17 1444   BP: 114/68   Pulse: (!) 114   Resp: 14   SpO2: 98%   Weight: 123 lb (55.8 kg)   Height: 5' 1\" (1.549 m)     General: Well developed, in no acute distress. HEENT: No jaundice, oral mucosa moist, no oral ulcers  Neck: Supple, no stiffness, no lymphadenopathy, supple  Heart:  Normal S1/S2 negative S3 or S4.  Regular, no murmur, gallop or rub, no jugular venous distention  Respiratory: Clear bilaterally x 4, no wheezing or rales    Extremities:  No edema, normal cap refill, no cyanosis. Musculoskeletal: No clubbing, no deformities  Neuro: A&Ox3, speech clear, gait stable, cooperative, no focal neurologic deficits  Skin: Skin color is normal. No rashes or lesions. Non diaphoretic, moist.  Vascular: 2+ pulses symmetric in all extremities        EKG:      DIAGNOSTIC DATA     1. Echocardiograms (3/27/12)   SUMMARY:  Left ventricle: Systolic function was normal. Ejection fraction was  estimated in the range of 55 % to 60 %. There were no regional wall motion  Abnormalities.         SUMMARY: (11/18/13)  Left ventricle: Systolic function was normal. Ejection fraction was  estimated in the range of 60 % to 65 %. There were no regional wall motion  abnormalities. Mitral valve: Flail anterior mitral valve leaflet. There was a marked  prolapse involving the medial segment of the anterior leaflet. There was  moderate to severe regurgitation. Tricuspid valve: There was mild regurgitation.    (3/5/14)  SUMMARY:  Left ventricle: Systolic function was normal. Ejection fraction was  estimated in the range of 55 % to 60 %. There were no regional wall motion  abnormalities. Mitral valve: Unable to get mean gradient across the MV due to tachy heart  rate. Tricuspid valve: There was mild regurgitation. 2. Lipids  2/10/17- , HDL 59, , TG 40    3.  Holter  3/5/14- ST        LABORATORY DATA            Lab Results   Component Value Date/Time    WBC 3.9 02/10/2017 03:41 PM    HGB 13.5 02/10/2017 03:41 PM    HCT 41.3 02/10/2017 03:41 PM    PLATELET 920 36/01/7213 03:41 PM    MCV 69 02/10/2017 03:41 PM      Lab Results   Component Value Date/Time    Sodium 140 02/10/2017 03:41 PM    Potassium 4.5 02/10/2017 03:41 PM    Chloride 99 02/10/2017 03:41 PM    CO2 23 02/10/2017 03:41 PM    Anion gap 7 11/11/2014 12:25 PM    Glucose 89 02/10/2017 03:41 PM    BUN 10 02/10/2017 03:41 PM    Creatinine 0.63 02/10/2017 03:41 PM    BUN/Creatinine ratio 16 02/10/2017 03:41 PM    GFR est  02/10/2017 03:41 PM    GFR est non- 02/10/2017 03:41 PM    Calcium 9.6 02/10/2017 03:41 PM    Bilirubin, total 0.3 02/10/2017 03:41 PM    AST (SGOT) 14 02/10/2017 03:41 PM    Alk. phosphatase 72 02/10/2017 03:41 PM    Protein, total 7.5 02/10/2017 03:41 PM    Albumin 4.3 02/10/2017 03:41 PM    Globulin 3.7 11/11/2014 12:25 PM    A-G Ratio 1.3 02/10/2017 03:41 PM    ALT (SGPT) 10 02/10/2017 03:41 PM           ASSESSMENT/RECOMMENDATIONS:.      1. Chest discomfort  -She continues to have chest pain, and had her cardiac MRI earlier today, awaiting results. 2. Non-rheumatic mitral regurgitation s/p valve repair 2014  -as per #1    3. Tachycardia  -Pulse is still elevated. -Holter monitor placed in the past showing HR in . If her holter monitor is normal, will have her see Dr. Kita Rowe. 4. Return in 6 months or PRN. No orders of the defined types were placed in this encounter. Follow-up Disposition: Not on File      I have discussed the diagnosis with  Micah Schwartz and the intended plan as seen in the above orders. Questions were answered concerning future plans. I have discussed medication side effects and warnings with the patient as well. Thank you,  Vance Marcelo MD for involving me in the care of  Micah Schwartz. Please do not hesitate to contact me for further questions/concerns. Written by Kota Mcnulty, as dictated by Nu Jeffers MD.      Socrates Lo MD, [facility] [address]      [facility], [address] [place] [address] Drive      [phone] / [phone] Fax

## 2017-03-01 NOTE — PROGRESS NOTES
Cardiac MRI today.     Visit Vitals    /68 (BP 1 Location: Left arm, BP Patient Position: Sitting)    Pulse (!) 114    Resp 14    Ht 5' 1\" (1.549 m)    Wt 123 lb (55.8 kg)    LMP 02/05/2017    SpO2 98%    BMI 23.24 kg/m2

## 2017-03-01 NOTE — MR AVS SNAPSHOT
Visit Information Date & Time Provider Department Dept. Phone Encounter #  
 3/1/2017  2:40 PM Walker Thompson MD [facility] Joan Woodward [phone] [id number] Your Appointments 7/21/2017  1:30 PM  
ESTABLISHED PATIENT with Haim Kirkpatrick MD  
[facility] at [facility]) Appt Note: f/U  
 [address] [facility], [address] [facility] 99 [address]  
[phone]  
  
   
 [address] [facility], [address] [place] [address] Upcoming Health Maintenance Date Due  
 HPV AGE 9Y-34Y (2 of 3 - Female 3 Dose Series) 12/9/2011 DTaP/Tdap/Td series (1 - Tdap) 6/14/2016 PAP AKA CERVICAL CYTOLOGY 6/14/2016 INFLUENZA AGE 9 TO ADULT 8/1/2016 Allergies as of 3/1/2017  Review Complete On: 3/1/2017 By: Annetta Public Severity Noted Reaction Type Reactions Amoxicillin  11/02/2011    Rash Skin peeling Current Immunizations  Reviewed on 8/11/2016 Name Date Human Papillomavirus 10/14/2011 Meningococcal Vaccine 9/21/2010 Not reviewed this visit You Were Diagnosed With   
  
 Codes Comments Non-rheumatic mitral regurgitation    -  Primary ICD-10-CM: I34.0 ICD-9-CM: 424.0 S/P MVR (mitral valve repair)     ICD-10-CM: W09.724 ICD-9-CM: V45.89 Other iron deficiency anemia     ICD-10-CM: D50.8 Vitals BP  
  
  
  
  
  
 114/68 (BP 1 Location: Left arm, BP Patient Position: Sitting) Vitals History BMI and BSA Data Body Mass Index Body Surface Area  
 23.24 kg/m 2 1.55 m 2 Preferred Pharmacy Pharmacy Name Phone [facility] [address], [address] [place] [address] Andres Farris [phone] Your Updated Medication List  
  
   
This list is accurate as of: 3/1/17  3:14 PM.  Always use your most recent med list.  
  
  
  
  
 clonazePAM 0.5 mg tablet Commonly known as:  Suha Katos Take 1 Tab by mouth daily as needed. escitalopram oxalate 10 mg tablet Commonly known as:  Diandra Cherrie Take 1 Tab by mouth daily. Introducing [facility] SERVICES! Dear Meena Nguyễn: Thank you for requesting a [employer] account. Our records indicate that you already have an active [employer] account. You can access your account anytime at [email or web address] Did you know that you can access your hospital and ER discharge instructions at any time in [employer]? You can also review all of your test results from your hospital stay or ER visit. Additional Information If you have questions, please visit the Frequently Asked Questions section of the [employer] website at [email or web address] Remember, [employer] is NOT to be used for urgent needs. For medical emergencies, dial 911. Now available from your iPhone and Android! Please provide this summary of care documentation to your next provider. Your primary care clinician is listed as KM PATE. If you have any questions after today's visit, please call [phone].

## 2017-04-11 ENCOUNTER — OFFICE VISIT (OUTPATIENT)
Dept: FAMILY MEDICINE CLINIC | Age: 22
End: 2017-04-11

## 2017-04-11 VITALS
TEMPERATURE: 98.4 F | WEIGHT: 123 LBS | BODY MASS INDEX: 23.22 KG/M2 | HEIGHT: 61 IN | OXYGEN SATURATION: 98 % | RESPIRATION RATE: 18 BRPM | SYSTOLIC BLOOD PRESSURE: 120 MMHG | HEART RATE: 100 BPM | DIASTOLIC BLOOD PRESSURE: 81 MMHG

## 2017-04-11 DIAGNOSIS — E78.2 MIXED HYPERLIPIDEMIA: ICD-10-CM

## 2017-04-11 DIAGNOSIS — F41.1 GAD (GENERALIZED ANXIETY DISORDER): Primary | ICD-10-CM

## 2017-04-11 RX ORDER — ESCITALOPRAM OXALATE 10 MG/1
10 TABLET ORAL DAILY
Qty: 90 TAB | Refills: 1 | Status: SHIPPED | OUTPATIENT
Start: 2017-04-11 | End: 2017-04-14 | Stop reason: SDUPTHER

## 2017-04-11 NOTE — PROGRESS NOTES
Chief Complaint   Patient presents with   [address] [facility] [address] Cholesterol Problem     Patient in office today for f/u and fasting lipid check; pt has noticed improvement with anxiety since starting medication. 1. Have you been to the ER, urgent care clinic since your last visit? Hospitalized since your last visit? No    2. Have you seen or consulted any other health care providers outside of the [address] [place] [address] since your last visit? Include any pap smears or colon screening.  No

## 2017-04-11 NOTE — PROGRESS NOTES
Chief Complaint   Patient presents with   [address] [facility] Cholesterol Problem     Patient in office today for f/u and fasting lipid check; Pt has noticed improvement with anxiety since starting medication. 1. Have you been to the ER, urgent care clinic since your last visit? Hospitalized since your last visit? No    2. Have you seen or consulted any other health care providers outside of the [facility] since your last visit? Include any pap smears or colon screening. No    Pt states she is back to feeling her normal self. Tolerating lexapro without any SEs. Has not needed to take the klonopin. Pt has made some diet changes to help improve lipids. Eating more salad. More healthy and well rounded. Denies any cp, sob, and dyspnea. Chief Complaint   Patient presents with    Anxiety    Cholesterol Problem     she is a 24y.o. year old female who presents for evalution. Reviewed PmHx, RxHx, FmHx, SocHx, AllgHx and updated and dated in the chart. Review of Systems - negative except as listed above in the HPI    Objective:     Vitals:    04/11/17 1115   BP: 120/81   Pulse: 100   Resp: 18   Temp: 98.4 °F (36.9 °C)   TempSrc: Oral   SpO2: 98%   Weight: 123 lb (55.8 kg)   Height: 5' 1\" (1.549 m)     Physical Examination: General appearance - alert, well appearing, and in no distress  Mental status - normal mood, behavior  Chest - clear to auscultation, no wheezes, rales or rhonchi, symmetric air entry  Heart - normal rate, regular rhythm, normal S1, S2, no murmurs    Assessment/ Plan:   Javon Williamson was seen today for anxiety and cholesterol problem. Diagnoses and all orders for this visit:    MEDHAT (generalized anxiety disorder)  -     escitalopram oxalate (LEXAPRO) 10 mg tablet; Take 1 Tab by mouth daily. Doing well! Continue taking daily. Follow up as needed for ongoing management. Mixed hyperlipidemia  -     LIPID PANEL  Enc pt to continue with her diet efforts.  Will notify results and deviate plan based on findings. Follow-up Disposition:  Return if symptoms worsen or fail to improve. I have discussed the diagnosis with the patient and the intended plan as seen in the above orders. The patient has received an after-visit summary and questions were answered concerning future plans. Medication Side Effects and Warnings were discussed with patient: yes  Patient Labs were reviewed and or requested: yes  Patient Past Records were reviewed and or requested  yes  Patient / Caregiver Understanding of treatment plan was verbalized during office visit YES    MURIEL Contreras    There are no Patient Instructions on file for this visit.

## 2017-04-12 LAB
CHOLEST SERPL-MCNC: 176 MG/DL (ref 100–199)
HDLC SERPL-MCNC: 62 MG/DL
INTERPRETATION, 910389: NORMAL
LDLC SERPL CALC-MCNC: 105 MG/DL (ref 0–99)
SPECIMEN STATUS REPORT, ROLRST: NORMAL
TRIGL SERPL-MCNC: 47 MG/DL (ref 0–149)
VLDLC SERPL CALC-MCNC: 9 MG/DL (ref 5–40)

## 2017-04-13 DIAGNOSIS — F41.1 GAD (GENERALIZED ANXIETY DISORDER): ICD-10-CM

## 2017-04-13 NOTE — PROGRESS NOTES
The following message was sent to pt via [employer] portal in reference to lab results:    Good morning Cee,     Attached are the results of your repeat cholesterol. It is MUCH improved. Keep up the good work with following a heart healthy diet. I recommend we repeat fasting labs in 1 year. Please let me know if you have any questions or concerns regarding these results.    Yecenia David, JOSE-C

## 2017-04-14 RX ORDER — ESCITALOPRAM OXALATE 10 MG/1
TABLET ORAL
Qty: 30 TAB | Refills: 5 | Status: SHIPPED | OUTPATIENT
Start: 2017-04-14 | End: 2017-05-18 | Stop reason: SDUPTHER

## 2017-05-18 DIAGNOSIS — F41.1 GAD (GENERALIZED ANXIETY DISORDER): ICD-10-CM

## 2017-05-18 RX ORDER — ESCITALOPRAM OXALATE 10 MG/1
TABLET ORAL
Qty: 90 TAB | Refills: 1 | Status: SHIPPED | OUTPATIENT
Start: 2017-05-18 | End: 2017-09-08 | Stop reason: SDUPTHER

## 2017-07-31 ENCOUNTER — TELEPHONE (OUTPATIENT)
Dept: ONCOLOGY | Age: 22
End: 2017-07-31

## 2017-07-31 NOTE — TELEPHONE ENCOUNTER
[facility]  Medical Oncology at [address] [facility]    07/31/17- Phone call placed to pt to remind pt to have labs drawn prior to her follow up appointment with . Phone numbers in our system for patient do not work.

## 2017-08-09 ENCOUNTER — OFFICE VISIT (OUTPATIENT)
Dept: ONCOLOGY | Age: 22
End: 2017-08-09

## 2017-08-09 ENCOUNTER — HOSPITAL ENCOUNTER (OUTPATIENT)
Dept: INFUSION THERAPY | Age: 22
Discharge: HOME | End: 2017-08-09
Payer: COMMERCIAL

## 2017-08-09 VITALS
RESPIRATION RATE: 16 BRPM | HEART RATE: 83 BPM | DIASTOLIC BLOOD PRESSURE: 71 MMHG | SYSTOLIC BLOOD PRESSURE: 116 MMHG | TEMPERATURE: 98 F

## 2017-08-09 VITALS
DIASTOLIC BLOOD PRESSURE: 69 MMHG | TEMPERATURE: 97.4 F | WEIGHT: 117 LBS | OXYGEN SATURATION: 98 % | HEIGHT: 61 IN | BODY MASS INDEX: 22.09 KG/M2 | RESPIRATION RATE: 20 BRPM | SYSTOLIC BLOOD PRESSURE: 120 MMHG

## 2017-08-09 DIAGNOSIS — K62.5 RECTAL BLEEDING: ICD-10-CM

## 2017-08-09 DIAGNOSIS — N92.4 EXCESSIVE BLEEDING IN PREMENOPAUSAL PERIOD: ICD-10-CM

## 2017-08-09 DIAGNOSIS — D50.0 IRON DEFICIENCY ANEMIA DUE TO CHRONIC BLOOD LOSS: Primary | ICD-10-CM

## 2017-08-09 LAB
BASOPHILS # BLD AUTO: 0.1 K/UL (ref 0–0.1)
BASOPHILS # BLD: 1 % (ref 0–1)
DIFFERENTIAL METHOD BLD: ABNORMAL
EOSINOPHIL # BLD: 0.6 K/UL (ref 0–0.4)
EOSINOPHIL NFR BLD: 11 % (ref 0–7)
ERYTHROCYTE [DISTWIDTH] IN BLOOD BY AUTOMATED COUNT: 15.2 % (ref 11.5–14.5)
FERRITIN SERPL-MCNC: 17 NG/ML (ref 8–252)
HCT VFR BLD AUTO: 38.5 % (ref 35–47)
HGB BLD-MCNC: 12.3 G/DL (ref 11.5–16)
IRON SATN MFR SERPL: 16 % (ref 20–50)
IRON SERPL-MCNC: 53 UG/DL (ref 35–150)
LYMPHOCYTES # BLD AUTO: 50 % (ref 12–49)
LYMPHOCYTES # BLD: 2.4 K/UL (ref 0.8–3.5)
MCH RBC QN AUTO: 22.1 PG (ref 26–34)
MCHC RBC AUTO-ENTMCNC: 31.9 G/DL (ref 30–36.5)
MCV RBC AUTO: 69.2 FL (ref 80–99)
MONOCYTES # BLD: 0.4 K/UL (ref 0–1)
MONOCYTES NFR BLD AUTO: 7 % (ref 5–13)
NEUTS SEG # BLD: 1.6 K/UL (ref 1.8–8)
NEUTS SEG NFR BLD AUTO: 31 % (ref 32–75)
PLATELET # BLD AUTO: 202 K/UL (ref 150–400)
RBC # BLD AUTO: 5.56 M/UL (ref 3.8–5.2)
RBC MORPH BLD: ABNORMAL
TIBC SERPL-MCNC: 327 UG/DL (ref 250–450)
WBC # BLD AUTO: 5.1 K/UL (ref 3.6–11)

## 2017-08-09 PROCEDURE — 36415 COLL VENOUS BLD VENIPUNCTURE: CPT | Performed by: INTERNAL MEDICINE

## 2017-08-09 PROCEDURE — 85025 COMPLETE CBC W/AUTO DIFF WBC: CPT | Performed by: INTERNAL MEDICINE

## 2017-08-09 PROCEDURE — 83540 ASSAY OF IRON: CPT | Performed by: INTERNAL MEDICINE

## 2017-08-09 PROCEDURE — 36415 COLL VENOUS BLD VENIPUNCTURE: CPT

## 2017-08-09 PROCEDURE — 82728 ASSAY OF FERRITIN: CPT | Performed by: INTERNAL MEDICINE

## 2017-08-09 NOTE — PROGRESS NOTES
Blood pressure 116/71, pulse 83, temperature 98 °F (36.7 °C), resp. rate 16. Pt arrived at 1051,labs drawn peripherally from left [facility]. Pt tolerated well and left at 1057.

## 2017-08-09 NOTE — PROGRESS NOTES
[address] [facility] at [facility]  [phone]    Hematology / Oncology Followup    Reason for Visit:   Narinder Munroe is a 25 y.o. female who is seen for follow up of anemia. History of Present Illness:   She noticed some blood in her stool yesterday and a few days prior. Turned the water red. No constipation. No other bleeding. Menses have become lighter, despite not taking the OCPs any more. Energy doing fairly well, some fatigue related to working long hours. No more dizziness. Not able to tolerate oral iron. PAST HISTORY: The following sections were reviewed and updated in the EMR as appropriate: PMH, SH, FH, Medications, Allergies. Allergies   Allergen Reactions    Amoxicillin Rash     Skin peeling      Review of Systems: A complete review of systems was obtained, reviewed, and scanned into the EMR. Pertinent findings reviewed above. Physical Exam:     Visit Vitals    /69 (BP 1 Location: Right arm, BP Patient Position: Sitting)  Comment: .  Temp 97.4 °F (36.3 °C) (Temporal)    Resp 20    Ht 5' 1\" (1.549 m)    Wt 117 lb (53.1 kg)    SpO2 98%    BMI 22.11 kg/m2     General: No distress  Eyes: PERRLA, anicteric sclerae  HENT: Atraumatic, OP clear  Neck: Supple  Lymphatic: No cervical, supraclavicular, or inguinal adenopathy  Respiratory: CTAB, normal respiratory effort  CV: Normal rate, regular rhythm, no murmurs, no peripheral edema  GI: Soft, nontender, nondistended, no masses, no hepatomegaly, no splenomegaly  MS: Normal gait and station. Digits without clubbing or cyanosis. Skin: No rashes, ecchymoses, or petechiae. Normal temperature, turgor, and texture.   Psych: Alert, oriented, appropriate affect, normal judgment/insight    Results:     Lab Results   Component Value Date/Time    WBC 5.1 08/09/2017 10:53 AM    HGB 12.3 08/09/2017 10:53 AM    HCT 38.5 08/09/2017 10:53 AM    PLATELET 823 08/91/4188 10:53 AM    MCV 69.2 08/09/2017 10:53 AM ABS. NEUTROPHILS 1.6 08/09/2017 10:53 AM     Lab Results   Component Value Date/Time    Sodium 140 02/10/2017 03:41 PM    Potassium 4.5 02/10/2017 03:41 PM    Chloride 99 02/10/2017 03:41 PM    CO2 23 02/10/2017 03:41 PM    Glucose 89 02/10/2017 03:41 PM    BUN 10 02/10/2017 03:41 PM    Creatinine 0.63 02/10/2017 03:41 PM    GFR est  02/10/2017 03:41 PM    GFR est non- 02/10/2017 03:41 PM    Calcium 9.6 02/10/2017 03:41 PM    Glucose (POC) 108 12/15/2013 07:26 AM     Lab Results   Component Value Date/Time    Bilirubin, total 0.3 02/10/2017 03:41 PM    ALT (SGPT) 10 02/10/2017 03:41 PM    AST (SGOT) 14 02/10/2017 03:41 PM    Alk. phosphatase 72 02/10/2017 03:41 PM    Protein, total 7.5 02/10/2017 03:41 PM    Albumin 4.3 02/10/2017 03:41 PM    Globulin 3.7 11/11/2014 12:25 PM     Lab Results   Component Value Date/Time    Reticulocyte count 0.7 09/27/2016 04:29 PM    Iron % saturation 16 08/09/2017 10:53 AM    TIBC 327 08/09/2017 10:53 AM    Ferritin 17 08/09/2017 10:53 AM    Haptoglobin 99 09/27/2016 04:29 PM     09/27/2016 04:29 PM    Sed rate (ESR) 59 10/20/2011 11:40 AM    TSH 0.666 02/10/2017 03:41 PM    Lipase 153 11/11/2014 12:25 PM     Lab Results   Component Value Date/Time    INR 1.1 12/16/2013 04:00 AM    aPTT 30.3 12/16/2013 04:00 AM    D-Dimer 0.28 02/15/2017 04:31 PM    Factor VIII Activity 139 07/29/2016 12:17 PM    von Willebrand Factor (vWF) Ag 104 07/29/2016 12:17 PM    vWF Activity 71 07/29/2016 12:17 PM     Ferritin   Date Value   08/09/2017 17 NG/ML   01/25/2017 79 ng/mL   09/27/2016 317 ng/mL (H)   08/22/2016 565 ng/mL (H)   07/29/2016 7 NG/ML (L)       9/27/2016:  Hemoglobin fractionation: normal    1/25/2017:  HGB: 13.4  WBC: 5.7  PLT: 260  MCV: 71  Iron saturation: 31%  Ferritin: 79    1/25/2017:  Smear: Microcytosis offset by a high normal RBC count, suggestive of minor thalassemia or innocent hemoglobinopathy.     US pelvis, US transvaginal 7/13/2016: normal      Assessment:   1) Anemia, iron deficiency  Resolved s/p IV iron. However, her microcytosis persists. Hemoglobin fractionation is negative for beta thal.  I suspect alpha thal trait, smear is consistent, but at this point there is no indication for treatment an no need to send genetic testing to confirm. Monitor. Repeat labs today show normal hemoglobin, though iron levels drifting back down. She cannot tolerate oral iron. We will continue to monitor in case she needs additional IV iron at some point. She should be taking folic acid daily, given the concern for alpha thal.    2) Menorrhagia  Improved, off OCPs. VWF panel was negative. 3) Dizziness, fatigue  Resolved. 4) Rectal bleeding  Refer to GI, given the associated iron deficiency.     Plan:     · Labs today: CBC, iron profile, ferritin  · Labs in 6-12 months: CBC, iron profile, ferritin ([facility])  · Return to see me in 6-12 months      Signed By: Elma Alves MD     August 9, 2017

## 2017-08-09 NOTE — MR AVS SNAPSHOT
Visit Information Date & Time Provider Department Dept. Phone Encounter #  
 8/9/2017  9:30 AM Constance Mccullough MD [facility] at [address] [place] [address] Rd [id number] Follow-up Instructions Return in about 1 year (around 8/9/2018) for Kaz iron deficiency fu. Your Appointments 9/6/2017  2:20 PM  
ESTABLISHED PATIENT with Marcellus Rodrigues MD  
[facility] (SANDRINE SCHEDULING) Appt Note: 6 mo fup  
 [address] [facility] [address] [place] [address] Road  
[address] [place] [id number] [address] Upcoming Health Maintenance Date Due  
 HPV AGE 9Y-34Y (2 of 3 - Female 3 Dose Series) 12/9/2011 DTaP/Tdap/Td series (1 - Tdap) 6/14/2016 PAP AKA CERVICAL CYTOLOGY 6/14/2016 INFLUENZA AGE 9 TO ADULT 8/1/2017 Allergies as of 8/9/2017  Review Complete On: 8/9/2017 By: Constance Mccullough MD  
  
 Severity Noted Reaction Type Reactions Amoxicillin  11/02/2011    Rash Skin peeling Current Immunizations  Reviewed on 8/11/2016 Name Date Human Papillomavirus 10/14/2011 Meningococcal Vaccine 9/21/2010 Not reviewed this visit You Were Diagnosed With   
  
 Codes Comments Iron deficiency anemia due to chronic blood loss    -  Primary ICD-10-CM: D50.0 ICD-9-CM: 280.0 Excessive bleeding in premenopausal period     ICD-10-CM: N92.4 ICD-9-CM: 627.0 Rectal bleeding     ICD-10-CM: K62.5 ICD-9-CM: 569.3 Vitals BP Temp Resp Height(growth percentile) Weight(growth percentile) 120/69 (BP 1 Location: Right arm, BP Patient Position: Sitting) 97.4 °F (36.3 °C) (Temporal) 20 5' 1\" (1.549 m) 117 lb (53.1 kg) SpO2 BMI OB Status Smoking Status 98% 22.11 kg/m2 Having regular periods Never Smoker Vitals History BMI and BSA Data Body Mass Index Body Surface Area  
 22.11 kg/m 2 1.51 m 2 Preferred Pharmacy Pharmacy Name Phone [address] [facility], [address] [facility] [address] Felicita Villalta Your Updated Medication List  
  
   
This list is accurate as of: 8/9/17 10:33 AM.  Always use your most recent med list.  
  
  
  
  
 clonazePAM 0.5 mg tablet Commonly known as:  Hardy Kobs Take 1 Tab by mouth daily as needed. escitalopram oxalate 10 mg tablet Commonly known as:  LEXAPRO  
take 1 tablet by mouth daily We Performed the Following REFERRAL TO GASTROENTEROLOGY [PNJ19 Custom] Comments:  
 Please evaluate patient for rectal bleeding, iron deficiency. Follow-up Instructions Return in about 1 year (around 8/9/2018) for Raddin, iron deficiency fu. Referral Information Referral ID Referred By Referred To  
  
 [id number] [address] [facility], [address] [facility]   
   [address] [facility] 21  [place], [address] [facility] Visits Status Start Date End Date 1 New Request 8/9/17 8/9/18 If your referral has a status of pending review or denied, additional information will be sent to support the outcome of this decision. Introducing [facility] & [facility] SERVICES! Dear Ciarra Rodriguez: Thank you for requesting a [employer] account. Our records indicate that you already have an active [employer] account. You can access your account anytime at [email or web address] Did you know that you can access your hospital and ER discharge instructions at any time in [employer]? You can also review all of your test results from your hospital stay or ER visit. Additional Information If you have questions, please visit the Frequently Asked Questions section of the [employer] website at [email or web address] [email or web address] Remember, [employer] is NOT to be used for urgent needs. For medical emergencies, dial 911. Now available from your iPhone and Android! Please provide this summary of care documentation to your next provider. Your primary care clinician is listed as KM PATE. If you have any questions after today's visit, please call [phone].

## 2017-08-10 ENCOUNTER — TELEPHONE (OUTPATIENT)
Dept: ONCOLOGY | Age: 22
End: 2017-08-10

## 2017-09-06 ENCOUNTER — OFFICE VISIT (OUTPATIENT)
Dept: CARDIOLOGY CLINIC | Age: 22
End: 2017-09-06

## 2017-09-06 VITALS
HEART RATE: 96 BPM | DIASTOLIC BLOOD PRESSURE: 60 MMHG | OXYGEN SATURATION: 99 % | BODY MASS INDEX: 21.34 KG/M2 | SYSTOLIC BLOOD PRESSURE: 100 MMHG | RESPIRATION RATE: 16 BRPM | WEIGHT: 113 LBS | HEIGHT: 61 IN

## 2017-09-06 DIAGNOSIS — I34.0 NON-RHEUMATIC MITRAL REGURGITATION: Primary | ICD-10-CM

## 2017-09-06 DIAGNOSIS — Z98.890 S/P MVR (MITRAL VALVE REPAIR): ICD-10-CM

## 2017-09-06 NOTE — MR AVS SNAPSHOT
Visit Information Date & Time Provider Department Dept. Phone Encounter #  
 9/6/2017  2:20 PM Grisel Dubois MD [facility] Madhuri Lopez [phone] [id number] Your Appointments 2/9/2018 10:30 AM  
ESTABLISHED PATIENT with Ignacio Christina MD  
[facility] at [facility] [address]) Appt Note: Raddin, iron deficiency fu.  
 [address], [address] [facility] [address]  
[phone]  
  
   
 [address], [address] [place] [address] Upcoming Health Maintenance Date Due  
 HPV AGE 9Y-34Y (2 of 3 - Female 3 Dose Series) 12/9/2011 DTaP/Tdap/Td series (1 - Tdap) 6/14/2016 PAP AKA CERVICAL CYTOLOGY 6/14/2016 INFLUENZA AGE 9 TO ADULT 8/1/2017 Allergies as of 9/6/2017  Review Complete On: 9/6/2017 By: Grisel Dubois MD  
  
 Severity Noted Reaction Type Reactions Amoxicillin  11/02/2011    Rash Skin peeling Current Immunizations  Reviewed on 8/11/2016 Name Date Human Papillomavirus 10/14/2011 Meningococcal Vaccine 9/21/2010 Not reviewed this visit You Were Diagnosed With   
  
 Codes Comments Non-rheumatic mitral regurgitation    -  Primary ICD-10-CM: I34.0 ICD-9-CM: 424.0 S/P MVR (mitral valve repair)     ICD-10-CM: J81.272 ICD-9-CM: V45.89 Vitals BP Pulse Resp Height(growth percentile) Weight(growth percentile) SpO2  
 100/60 (BP 1 Location: Left arm, BP Patient Position: Sitting) 96 16 5' 1\" (1.549 m) 113 lb (51.3 kg) 99% BMI OB Status Smoking Status 21.35 kg/m2 Having regular periods Never Smoker BMI and BSA Data Body Mass Index Body Surface Area  
 21.35 kg/m 2 1.49 m 2 Preferred Pharmacy Pharmacy Name Phone 305 [facility], [address] [place] [address] [address] Your Updated Medication List  
  
   
This list is accurate as of: 9/6/17  2:59 PM.  Always use your most recent med list.  
  
  
  
  
 clonazePAM 0.5 mg tablet Commonly known as:  Waynard Burton Take 1 Tab by mouth daily as needed. escitalopram oxalate 10 mg tablet Commonly known as:  LEXAPRO  
take 1 tablet by mouth daily Introducing [facility] & HEALTH SERVICES! Dear Idalia Delacruz: Thank you for requesting a [employer] account. Our records indicate that you already have an active [employer] account. You can access your account anytime at [email or web address] Did you know that you can access your hospital and ER discharge instructions at any time in Contactually? You can also review all of your test results from your hospital stay or ER visit. Additional Information If you have questions, please visit the Frequently Asked Questions section of the [employer] website at [email or web address] Remember, [employer] is NOT to be used for urgent needs. For medical emergencies, dial 911. Now available from your iPhone and Android! Please provide this summary of care documentation to your next provider. Your primary care clinician is listed as KM PATE. If you have any questions after today's visit, please call [phone].

## 2017-09-06 NOTE — PROGRESS NOTES
LAST OFFICE VISIT : 3/1/2017        ICD-10-CM ICD-9-CM   1. Non-rheumatic mitral regurgitation I34.0 424.0   2. S/P MVR (mitral valve repair) Z98.890 V45.89          Saint Meinrad Alexandrea is a 25 y.o. female  with non-rheumatic mitral regurgitation s/p MVR referred for routine 6 month follow up    Cardiac risk factors: none  I have personally obtained the history from the patient. HISTORY OF PRESENTING ILLNESS      The patient has no interval complaints. She stays active working for [employer] The patient denies chest pain/ shortness of breath, orthopnea, PND, LE edema, palpitations, syncope, presyncope or fatigue.        ACTIVE PROBLEM LIST     Patient Active Problem List    Diagnosis Date Noted    S/P MVR (mitral valve repair) 12/13/2013    Mitral regurgitation 12/02/2013    Anemia, iron deficiency 01/16/2012    Scoliosis 12/07/2010           PAST MEDICAL HISTORY     Past Medical History:   Diagnosis Date    Anemia     Murmur     Valvular heart disease            PAST SURGICAL HISTORY     Past Surgical History:   Procedure Laterality Date    CARDIAC SURG PROCEDURE UNLIST            ALLERGIES     Allergies   Allergen Reactions    Amoxicillin Rash     Skin peeling          FAMILY HISTORY     Family History   Problem Relation Age of Onset    Cancer Father      LYMPHOMA    Heart Disease Father     Other Mother     Arthritis-osteo Mother     Kidney Disease Mother      ANKYLOSING SPONDYLITIS    Diabetes Maternal Grandmother     Hypertension Maternal Grandmother     Kidney Disease Maternal Grandmother     Cancer Maternal Aunt      LUNG CA - SMOKER    Other Maternal Aunt      SARCOIDOSIS    Anesth Problems Neg Hx     negative for cardiac disease       SOCIAL HISTORY     Social History     Social History    Marital status: SINGLE     Spouse name: N/A    Number of children: N/A    Years of education: N/A     Social History Main Topics    Smoking status: Never Smoker    Smokeless tobacco: Never Used    Alcohol use No      Comment: rarely    Drug use: No    Sexual activity: No     Other Topics Concern    None     Social History Narrative         MEDICATIONS     Current Outpatient Prescriptions   Medication Sig    escitalopram oxalate (LEXAPRO) 10 mg tablet take 1 tablet by mouth daily    clonazePAM (KLONOPIN) 0.5 mg tablet Take 1 Tab by mouth daily as needed. No current facility-administered medications for this visit. I have reviewed the nurses notes, vitals, problem list, allergy list, medical history, family, social history and medications. REVIEW OF SYMPTOMS      General: Pt denies excessive weight gain or loss. Pt is able to conduct ADL's  HEENT: Denies blurred vision, headaches, hearing loss, epistaxis and difficulty swallowing. Respiratory: Denies cough, congestion, shortness of breath, WONG, wheezing or stridor. Cardiovascular: Denies precordial pain, palpitations, edema or PND  Gastrointestinal: Denies poor appetite, indigestion, abdominal pain or blood in stool  Genitourinary: Denies hematuria, dysuria, increased urinary frequency  Musculoskeletal: Denies joint pain or swelling from muscles or joints  Neurologic: Denies tremor, paresthesias, headache, or sensory motor disturbance  Psychiatric: Denies confusion, insomnia, depression  Integumentray: Denies rash, itching or ulcers. Hematologic: Denies easy bruising, bleeding     PHYSICAL EXAMINATION      Vitals:    09/06/17 1438   BP: 100/60   Pulse: 96   Resp: 16   SpO2: 99%   Weight: 113 lb (51.3 kg)   Height: 5' 1\" (1.549 m)     General: Well developed, in no acute distress. HEENT: No jaundice, oral mucosa moist, no oral ulcers  Neck: Supple, no stiffness, no lymphadenopathy, supple  Heart:  Normal S1/S2 negative S3 or S4. Regular, no murmur, gallop or rub, no jugular venous distention  Respiratory: Clear bilaterally x 4, no wheezing or rales  Extremities:  No edema, normal cap refill, no cyanosis.   Musculoskeletal: No clubbing, no deformities  Neuro: A&Ox3, speech clear, gait stable, cooperative, no focal neurologic deficits  Skin: Skin color is normal. No rashes or lesions. Non diaphoretic, moist.         DIAGNOSTIC DATA     1. Echocardiograms (3/27/12)   SUMMARY:  Left ventricle: Systolic function was normal. Ejection fraction was  estimated in the range of 55 % to 60 %. There were no regional wall motion  Abnormalities.         SUMMARY: (11/18/13)  Left ventricle: Systolic function was normal. Ejection fraction was  estimated in the range of 60 % to 65 %. There were no regional wall motion  abnormalities. Mitral valve: Flail anterior mitral valve leaflet. There was a marked  prolapse involving the medial segment of the anterior leaflet. There was  moderate to severe regurgitation. Tricuspid valve: There was mild regurgitation.    (3/5/14)  SUMMARY:  Left ventricle: Systolic function was normal. Ejection fraction was  estimated in the range of 55 % to 60 %. There were no regional wall motion  abnormalities. Mitral valve: Unable to get mean gradient across the MV due to tachy heart  rate. Tricuspid valve: There was mild regurgitation. 2. Lipids  2/10/17- , HDL 59, , TG 40  4/11/17- , HDL 62, , TG 47    3.  Holter  3/5/14- ST        LABORATORY DATA            Lab Results   Component Value Date/Time    WBC 5.1 08/09/2017 10:53 AM    HGB 12.3 08/09/2017 10:53 AM    HCT 38.5 08/09/2017 10:53 AM    PLATELET 145 76/80/7404 10:53 AM    MCV 69.2 08/09/2017 10:53 AM      Lab Results   Component Value Date/Time    Sodium 140 02/10/2017 03:41 PM    Potassium 4.5 02/10/2017 03:41 PM    Chloride 99 02/10/2017 03:41 PM    CO2 23 02/10/2017 03:41 PM    Anion gap 7 11/11/2014 12:25 PM    Glucose 89 02/10/2017 03:41 PM    BUN 10 02/10/2017 03:41 PM    Creatinine 0.63 02/10/2017 03:41 PM    BUN/Creatinine ratio 16 02/10/2017 03:41 PM    GFR est  02/10/2017 03:41 PM    GFR est non- 02/10/2017 03:41 PM    Calcium 9.6 02/10/2017 03:41 PM    Bilirubin, total 0.3 02/10/2017 03:41 PM    AST (SGOT) 14 02/10/2017 03:41 PM    Alk. phosphatase 72 02/10/2017 03:41 PM    Protein, total 7.5 02/10/2017 03:41 PM    Albumin 4.3 02/10/2017 03:41 PM    Globulin 3.7 11/11/2014 12:25 PM    A-G Ratio 1.3 02/10/2017 03:41 PM    ALT (SGPT) 10 02/10/2017 03:41 PM           ASSESSMENT/RECOMMENDATIONS:.     1. Non-rheumatic mitral regurgitation s/p valve repair 2014  -she is doing well and no physical exam findings of MR. Will check echo in 1 year.     2. Tachycardia  -HR is actually 96 now. She has no complaints of any irregularity in her rhythm.      3. Return in 1 year or PRN. No orders of the defined types were placed in this encounter. Follow-up Disposition: Not on File      I have discussed the diagnosis with  Olamide Doherty and the intended plan as seen in the above orders. Questions were answered concerning future plans. I have discussed medication side effects and warnings with the patient as well. Thank you,  La Colebrt MD for involving me in the care of  Olamide Doheryt. Please do not hesitate to contact me for further questions/concerns. Olman Lo MD, [facility]    Written by Amber Maguire, dictated by Joey Wilkerson MD.      [facility] [address]      [place] [address], [address]      [phone] / [phone] Fax

## 2017-09-06 NOTE — PROGRESS NOTES
Visit Vitals    /60 (BP 1 Location: Left arm, BP Patient Position: Sitting)    Pulse 96    Resp 16    Ht 5' 1\" (1.549 m)    Wt 113 lb (51.3 kg)    SpO2 99%    BMI 21.35 kg/m2

## 2017-09-08 DIAGNOSIS — F41.1 GAD (GENERALIZED ANXIETY DISORDER): ICD-10-CM

## 2017-09-08 RX ORDER — ESCITALOPRAM OXALATE 10 MG/1
TABLET ORAL
Qty: 90 TAB | Refills: 0 | Status: SHIPPED | OUTPATIENT
Start: 2017-09-08 | End: 2017-12-15 | Stop reason: SDUPTHER

## 2017-12-15 DIAGNOSIS — F41.1 GAD (GENERALIZED ANXIETY DISORDER): ICD-10-CM

## 2017-12-17 RX ORDER — ESCITALOPRAM OXALATE 10 MG/1
TABLET ORAL
Qty: 90 TAB | Refills: 0 | Status: SHIPPED | OUTPATIENT
Start: 2017-12-17 | End: 2018-05-14 | Stop reason: SDUPTHER

## 2018-01-31 ENCOUNTER — TELEPHONE (OUTPATIENT)
Dept: ONCOLOGY | Age: 23
End: 2018-01-31

## 2018-02-20 ENCOUNTER — TELEPHONE (OUTPATIENT)
Dept: ONCOLOGY | Age: 23
End: 2018-02-20

## 2018-02-20 NOTE — TELEPHONE ENCOUNTER
[address] at [place]  [phone]    02/20/18- Phone call placed to pt to remind pt to have labs drawn prior to her follow up appointment with . Pt verbalized understanding.

## 2018-02-22 ENCOUNTER — TELEPHONE (OUTPATIENT)
Dept: ONCOLOGY | Age: 23
End: 2018-02-22

## 2018-02-22 LAB
ERYTHROCYTE [DISTWIDTH] IN BLOOD BY AUTOMATED COUNT: 15.9 % (ref 12.3–15.4)
FERRITIN SERPL-MCNC: 11 NG/ML (ref 15–150)
HCT VFR BLD AUTO: 40.9 % (ref 34–46.6)
HGB BLD-MCNC: 12.6 G/DL (ref 11.1–15.9)
IRON SATN MFR SERPL: 7 % (ref 15–55)
IRON SERPL-MCNC: 32 UG/DL (ref 27–159)
MCH RBC QN AUTO: 21.2 PG (ref 26.6–33)
MCHC RBC AUTO-ENTMCNC: 30.8 G/DL (ref 31.5–35.7)
MCV RBC AUTO: 69 FL (ref 79–97)
PLATELET # BLD AUTO: 268 X10E3/UL (ref 150–379)
RBC # BLD AUTO: 5.95 X10E6/UL (ref 3.77–5.28)
TIBC SERPL-MCNC: 428 UG/DL (ref 250–450)
UIBC SERPL-MCNC: 396 UG/DL (ref 131–425)
WBC # BLD AUTO: 4.6 X10E3/UL (ref 3.4–10.8)

## 2018-02-22 NOTE — TELEPHONE ENCOUNTER
Pt returned Lida[employer] called stating her mother does have a new insurance card and she will bring it to the appointment with her.

## 2018-02-27 ENCOUNTER — OFFICE VISIT (OUTPATIENT)
Dept: ONCOLOGY | Age: 23
End: 2018-02-27

## 2018-02-27 VITALS
BODY MASS INDEX: 22.73 KG/M2 | HEART RATE: 92 BPM | TEMPERATURE: 97.5 F | HEIGHT: 61 IN | RESPIRATION RATE: 14 BRPM | DIASTOLIC BLOOD PRESSURE: 66 MMHG | OXYGEN SATURATION: 100 % | WEIGHT: 120.4 LBS | SYSTOLIC BLOOD PRESSURE: 121 MMHG

## 2018-02-27 DIAGNOSIS — D50.0 IRON DEFICIENCY ANEMIA DUE TO CHRONIC BLOOD LOSS: Primary | ICD-10-CM

## 2018-02-27 NOTE — PROGRESS NOTES
[facility] at [address], [address] [place] [address] [address] [place] [address]  Karla Rosas: [phone]  F: [phone]      Reason for Visit:   Jorge Morales is a 25 y.o. female who is seen for follow up of anemia. History of Present Illness:   She reports some progressive fatigue. Not getting much iron in her diet. No bleeding. No recurrence of the blood in her stool. She cancelled her appt with GI.  Menses no longer heavy. PAST HISTORY: The following sections were reviewed and updated in the EMR as appropriate: PMH, SH, FH, Medications, Allergies. Allergies   Allergen Reactions    Amoxicillin Rash     Skin peeling      Review of Systems: A complete review of systems was obtained, reviewed, and scanned into the EMR. Pertinent findings reviewed above. Physical Exam:     Visit Vitals    /66 (BP 1 Location: Right arm, BP Patient Position: Sitting)    Pulse 92    Temp 97.5 °F (36.4 °C) (Oral)    Resp 14    Ht 5' 1\" (1.549 m)    Wt 120 lb 6.4 oz (54.6 kg)    SpO2 100%    BMI 22.75 kg/m2     General: No distress  Eyes: PERRLA, anicteric sclerae  HENT: Atraumatic, OP clear  Neck: Supple  Lymphatic: No cervical, supraclavicular, or inguinal adenopathy  Respiratory: CTAB, normal respiratory effort  CV: Normal rate, regular rhythm, no murmurs, no peripheral edema  GI: Soft, nontender, nondistended, no masses, no hepatomegaly, no splenomegaly  MS: Normal gait and station. Digits without clubbing or cyanosis. Skin: No rashes, ecchymoses, or petechiae. Normal temperature, turgor, and texture. Psych: Alert, oriented, appropriate affect, normal judgment/insight    Results:     Lab Results   Component Value Date/Time    WBC 4.6 02/21/2018 11:45 AM    HGB 12.6 02/21/2018 11:45 AM    HCT 40.9 02/21/2018 11:45 AM    PLATELET 779 56/09/1415 11:45 AM    MCV 69 (L) 02/21/2018 11:45 AM    ABS.  NEUTROPHILS 1.6 (L) 08/09/2017 10:53 AM     Lab Results   Component Value Date/Time Sodium 140 02/10/2017 03:41 PM    Potassium 4.5 02/10/2017 03:41 PM    Chloride 99 02/10/2017 03:41 PM    CO2 23 02/10/2017 03:41 PM    Glucose 89 02/10/2017 03:41 PM    BUN 10 02/10/2017 03:41 PM    Creatinine 0.63 02/10/2017 03:41 PM    GFR est  02/10/2017 03:41 PM    GFR est non- 02/10/2017 03:41 PM    Calcium 9.6 02/10/2017 03:41 PM    Glucose (POC) 108 (H) 12/15/2013 07:26 AM     Lab Results   Component Value Date/Time    Bilirubin, total 0.3 02/10/2017 03:41 PM    ALT (SGPT) 10 02/10/2017 03:41 PM    AST (SGOT) 14 02/10/2017 03:41 PM    Alk. phosphatase 72 02/10/2017 03:41 PM    Protein, total 7.5 02/10/2017 03:41 PM    Albumin 4.3 02/10/2017 03:41 PM    Globulin 3.7 11/11/2014 12:25 PM     Lab Results   Component Value Date/Time    Reticulocyte count 0.7 09/27/2016 04:29 PM    Iron % saturation 7 (LL) 02/21/2018 11:45 AM    TIBC 428 02/21/2018 11:45 AM    Ferritin 11 (L) 02/21/2018 11:45 AM    Haptoglobin 99 09/27/2016 04:29 PM     09/27/2016 04:29 PM    Sed rate (ESR) 59 (H) 10/20/2011 11:40 AM    TSH 0.666 02/10/2017 03:41 PM    Lipase 153 11/11/2014 12:25 PM     Lab Results   Component Value Date/Time    INR 1.1 12/16/2013 04:00 AM    aPTT 30.3 (H) 12/16/2013 04:00 AM    D-Dimer 0.28 02/15/2017 04:31 PM    Factor VIII Activity 139 07/29/2016 12:17 PM    von Willebrand Factor (vWF) Ag 104 07/29/2016 12:17 PM    vWF Activity 71 07/29/2016 12:17 PM     Ferritin   Date Value   02/21/2018 11 ng/mL (L)   08/09/2017 17 NG/ML   01/25/2017 79 ng/mL   09/27/2016 317 ng/mL (H)   08/22/2016 565 ng/mL (H)   07/29/2016 7 NG/ML (L)       9/27/2016:  Hemoglobin fractionation: normal    1/25/2017:  HGB: 13.4  WBC: 5.7  PLT: 260  MCV: 71  Iron saturation: 31%  Ferritin: 79    1/25/2017:  Smear: Microcytosis offset by a high normal RBC count, suggestive of minor thalassemia or innocent hemoglobinopathy.     US pelvis, US transvaginal 7/13/2016: normal      Assessment:   1) Anemia, iron deficiency  Resolved s/p IV iron 8/2016. However, her microcytosis persists. Hemoglobin fractionation is negative for beta thal.  I suspect alpha thal trait, smear is consistent, but at this point there is no indication for treatment an no need to send genetic testing to confirm. Monitor. Repeat labs today show normal hemoglobin, though iron levels remain low. She cannot tolerate oral iron. I encouraged her to continue to focus on iron rich foods, and we will continue to monitor for the potential need for more IV iron in the future. She should continue folic acid daily, given the concern for alpha thal.    2) Menorrhagia  Improved, off OCPs. VWF panel was negative. 3) Rectal bleeding  Has resolved. She was referred to GI, but she cancelled her appt does not want to reschedule.     Plan:     · Labs in 6-12 months: CBC, iron profile, ferritin ([facility])  · Return to see me in 6-12 months      Signed By: Anibal Pichardo MD

## 2018-05-14 DIAGNOSIS — F41.1 GAD (GENERALIZED ANXIETY DISORDER): ICD-10-CM

## 2018-05-14 RX ORDER — ESCITALOPRAM OXALATE 10 MG/1
TABLET ORAL
Qty: 90 TAB | Refills: 1 | Status: SHIPPED | OUTPATIENT
Start: 2018-05-14 | End: 2018-11-29 | Stop reason: SDUPTHER

## 2018-08-21 ENCOUNTER — TELEPHONE (OUTPATIENT)
Dept: ONCOLOGY | Age: 23
End: 2018-08-21

## 2018-08-21 NOTE — TELEPHONE ENCOUNTER
[address] at [place] [address]  [phone]    08/21/18- Phone call placed to pt to remind pt to have labs drawn prior to her follow up appointment with . Attempted to leave VM- no vm set up.

## 2018-09-11 ENCOUNTER — OFFICE VISIT (OUTPATIENT)
Dept: FAMILY MEDICINE CLINIC | Age: 23
End: 2018-09-11

## 2018-09-11 VITALS
BODY MASS INDEX: 21.71 KG/M2 | OXYGEN SATURATION: 99 % | TEMPERATURE: 98.8 F | RESPIRATION RATE: 16 BRPM | WEIGHT: 115 LBS | HEART RATE: 96 BPM | DIASTOLIC BLOOD PRESSURE: 73 MMHG | SYSTOLIC BLOOD PRESSURE: 107 MMHG | HEIGHT: 61 IN

## 2018-09-11 DIAGNOSIS — Z00.00 ROUTINE GENERAL MEDICAL EXAMINATION AT A HEALTH CARE FACILITY: Primary | ICD-10-CM

## 2018-09-11 DIAGNOSIS — F41.9 ANXIETY: ICD-10-CM

## 2018-09-11 RX ORDER — BUSPIRONE HYDROCHLORIDE 10 MG/1
10 TABLET ORAL 2 TIMES DAILY
Qty: 60 TAB | Refills: 2 | Status: SHIPPED | OUTPATIENT
Start: 2018-09-11 | End: 2018-11-29

## 2018-09-11 NOTE — MR AVS SNAPSHOT
[address] [place] [address] 
[phone] Patient: Aristides Hayden MRN: [id number] SIB:[birth year] Visit Information Date & Time Provider Department Dept. Phone Encounter #  
 9/11/2018  9:00 AM Tiffanie Melara MD [address] [facility] [phone] [id number] Your Appointments 9/12/2018  1:00 PM  
ESTABLISHED PATIENT with Salena Curling, MD  
[facility] (SANDRINE SCHEDULING) Appt Note: annual  
 [address] [facility] [place] [address] [facility]  
[address] [facility] [place] [address] [address] Upcoming Health Maintenance Date Due  
 HPV Age 9Y-34Y (2 of 1 - Female 3 Dose Series) 12/9/2011 DTaP/Tdap/Td series (1 - Tdap) 6/14/2016 PAP AKA CERVICAL CYTOLOGY 6/14/2016 Influenza Age 5 to Adult 9/11/2019* *Topic was postponed. The date shown is not the original due date. Allergies as of 9/11/2018  Review Complete On: 9/11/2018 By: Tiffanie Melara MD  
  
 Severity Noted Reaction Type Reactions Amoxicillin  11/02/2011    Rash Skin peeling Current Immunizations  Reviewed on 8/11/2016 Name Date Human Papillomavirus 10/14/2011 Meningococcal Vaccine 9/21/2010 Not reviewed this visit You Were Diagnosed With   
  
 Codes Comments Routine general medical examination at a health care facility    -  Primary ICD-10-CM: Z00.00 ICD-9-CM: V70.0 Anxiety     ICD-10-CM: F41.9 ICD-9-CM: 300.00 Vitals BP Pulse Temp Resp Height(growth percentile) Weight(growth percentile) 107/73 96 98.8 °F (37.1 °C) (Oral) 16 5' 1\" (1.549 m) 115 lb (52.2 kg) LMP SpO2 BMI OB Status Smoking Status 09/08/2018 (Exact Date) 99% 21.73 kg/m2 Having regular periods Never Smoker BMI and BSA Data Body Mass Index Body Surface Area 21.73 kg/m 2 1.5 m 2 Preferred Pharmacy Pharmacy Name Phone [address], [address] [facility] [place] [phone] Your Updated Medication List  
  
   
This list is accurate as of 9/11/18  9:20 AM.  Always use your most recent med list.  
  
  
  
  
 busPIRone 10 mg tablet Commonly known as:  BUSPAR Take 1 Tab by mouth two (2) times a day. clonazePAM 0.5 mg tablet Commonly known as:  Stewart Aw Take 1 Tab by mouth daily as needed. escitalopram oxalate 10 mg tablet Commonly known as:  Gradie Kil TAKE 1 TABLET BY MOUTH  DAILY Prescriptions Sent to Pharmacy Refills  
 busPIRone (BUSPAR) 10 mg tablet 2 Sig: Take 1 Tab by mouth two (2) times a day. Class: Normal  
 [facility] [place] Ph #: [phone] Route: Oral  
  
We Performed the Following CBC WITH AUTOMATED DIFF [73559 CPT(R)] HEMOGLOBIN A1C WITH EAG [92332 CPT(R)] LIPID PANEL [62450 CPT(R)] METABOLIC PANEL, COMPREHENSIVE [49201 CPT(R)] TSH 3RD GENERATION [33415 CPT(R)] VITAMIN D, 25 HYDROXY [id number] CPT(R)] Introducing [facility]! Dear Janel Diana: Thank you for requesting a Get[employer] account. Our records indicate that you already have an active Get[employer] account. You can access your account anytime at [email or web address] Did you know that you can access your hospital and ER discharge instructions at any time in [employer]? You can also review all of your test results from your hospital stay or ER visit. Additional Information If you have questions, please visit the Frequently Asked Questions section of the Get[employer] website at [email or web address] Remember, Get[employer] is NOT to be used for urgent needs. For medical emergencies, dial 911. Now available from your iPhone and Android! Please provide this summary of care documentation to your next provider. Your primary care clinician is listed as KM PATE. If you have any questions after today's visit, please call [phone].

## 2018-09-11 NOTE — LETTER
9/11/2018 9:18 AM 
 
Ms. Evelio Monsivais 57 [address] [facility] [address] To Whom It May Concern: 
 
Pt is a current patient of [address] [place] [address], she is currently being treated for anxiety. Please let me know if I can be of further assistance. Sincerely, Jass Lawler MD

## 2018-09-12 LAB
25(OH)D3+25(OH)D2 SERPL-MCNC: 7.4 NG/ML (ref 30–100)
ALBUMIN SERPL-MCNC: 3.8 G/DL (ref 3.5–5.5)
ALBUMIN/GLOB SERPL: 1.2 {RATIO} (ref 1.2–2.2)
ALP SERPL-CCNC: 63 IU/L (ref 39–117)
ALT SERPL-CCNC: 12 IU/L (ref 0–32)
AST SERPL-CCNC: 15 IU/L (ref 0–40)
BASOPHILS # BLD AUTO: 0.1 X10E3/UL (ref 0–0.2)
BASOPHILS NFR BLD AUTO: 1 %
BILIRUB SERPL-MCNC: 0.2 MG/DL (ref 0–1.2)
BUN SERPL-MCNC: 9 MG/DL (ref 6–20)
BUN/CREAT SERPL: 11 (ref 9–23)
CALCIUM SERPL-MCNC: 9.3 MG/DL (ref 8.7–10.2)
CHLORIDE SERPL-SCNC: 101 MMOL/L (ref 96–106)
CHOLEST SERPL-MCNC: 158 MG/DL (ref 100–199)
CO2 SERPL-SCNC: 25 MMOL/L (ref 20–29)
CREAT SERPL-MCNC: 0.84 MG/DL (ref 0.57–1)
EOSINOPHIL # BLD AUTO: 0.4 X10E3/UL (ref 0–0.4)
EOSINOPHIL NFR BLD AUTO: 10 %
ERYTHROCYTE [DISTWIDTH] IN BLOOD BY AUTOMATED COUNT: 17.2 % (ref 12.3–15.4)
EST. AVERAGE GLUCOSE BLD GHB EST-MCNC: 114 MG/DL
GLOBULIN SER CALC-MCNC: 3.3 G/DL (ref 1.5–4.5)
GLUCOSE SERPL-MCNC: 83 MG/DL (ref 65–99)
HBA1C MFR BLD: 5.6 % (ref 4.8–5.6)
HCT VFR BLD AUTO: 36.5 % (ref 34–46.6)
HDLC SERPL-MCNC: 69 MG/DL
HGB BLD-MCNC: 10.5 G/DL (ref 11.1–15.9)
IMM GRANULOCYTES # BLD: 0 X10E3/UL (ref 0–0.1)
IMM GRANULOCYTES NFR BLD: 0 %
INTERPRETATION, 910389: NORMAL
LDLC SERPL CALC-MCNC: 81 MG/DL (ref 0–99)
LYMPHOCYTES # BLD AUTO: 2.1 X10E3/UL (ref 0.7–3.1)
LYMPHOCYTES NFR BLD AUTO: 47 %
MCH RBC QN AUTO: 19 PG (ref 26.6–33)
MCHC RBC AUTO-ENTMCNC: 28.8 G/DL (ref 31.5–35.7)
MCV RBC AUTO: 66 FL (ref 79–97)
MONOCYTES # BLD AUTO: 0.3 X10E3/UL (ref 0.1–0.9)
MONOCYTES NFR BLD AUTO: 6 %
NEUTROPHILS # BLD AUTO: 1.6 X10E3/UL (ref 1.4–7)
NEUTROPHILS NFR BLD AUTO: 36 %
PLATELET # BLD AUTO: 277 X10E3/UL (ref 150–379)
POTASSIUM SERPL-SCNC: 4.5 MMOL/L (ref 3.5–5.2)
PROT SERPL-MCNC: 7.1 G/DL (ref 6–8.5)
RBC # BLD AUTO: 5.53 X10E6/UL (ref 3.77–5.28)
SODIUM SERPL-SCNC: 138 MMOL/L (ref 134–144)
TRIGL SERPL-MCNC: 38 MG/DL (ref 0–149)
TSH SERPL DL<=0.005 MIU/L-ACNC: 0.84 UIU/ML (ref 0.45–4.5)
VLDLC SERPL CALC-MCNC: 8 MG/DL (ref 5–40)
WBC # BLD AUTO: 4.5 X10E3/UL (ref 3.4–10.8)

## 2018-09-14 NOTE — PROGRESS NOTES
Significant anemia noted, please take iron supplement daily. Recheck in 4-6 weeks. Cholesterol is normal.   Vitamin D is very low, recommend prescription supplementation, please advise if you agree. All other labs are within normal limits. A message has been sent in light and the lab work released to the patient.

## 2018-09-18 ENCOUNTER — OFFICE VISIT (OUTPATIENT)
Dept: ONCOLOGY | Age: 23
End: 2018-09-18

## 2018-09-18 VITALS
WEIGHT: 121 LBS | SYSTOLIC BLOOD PRESSURE: 112 MMHG | HEART RATE: 96 BPM | RESPIRATION RATE: 16 BRPM | TEMPERATURE: 98.4 F | HEIGHT: 61 IN | OXYGEN SATURATION: 99 % | DIASTOLIC BLOOD PRESSURE: 69 MMHG | BODY MASS INDEX: 22.84 KG/M2

## 2018-09-18 DIAGNOSIS — D50.0 IRON DEFICIENCY ANEMIA DUE TO CHRONIC BLOOD LOSS: ICD-10-CM

## 2018-09-18 DIAGNOSIS — D50.0 IRON DEFICIENCY ANEMIA DUE TO CHRONIC BLOOD LOSS: Primary | ICD-10-CM

## 2018-09-18 RX ORDER — FAMOTIDINE 20 MG/1
20 TABLET, FILM COATED ORAL
Qty: 30 TAB | Refills: 0 | Status: SHIPPED | OUTPATIENT
Start: 2018-09-18 | End: 2018-11-29

## 2018-09-18 NOTE — PROGRESS NOTES
[facility] [place] at [address], [address] [facility] [address] [facility]  Reshma Rhmagoe: [phone]  F: [phone]      Reason for Visit:   Sally Ochoa is a 21 y.o. female who is seen for follow up of anemia. History of Present Illness:   Presents today for follow up. Anxiety has been out of control. Wonders if due to work. Working at [employer] has been there for 3 years. 40 hours per week, stressful environment. Has had 2 episodes of \"falling out\" at work felt to be related to anxiety attack. Not drinking any caffeine. Drinking more water now-trying to get in 4-5 bottles of water daily. More nausea lately, appetite has been down. No vomiting. Indigestion symptoms, burning feeling in chest. Not taking OTC medications. Increased fatigue all in the last month. Regular menses, lasting about 4-5 days. Lighter than normal. 9/8/2018 LMP. PAST HISTORY: The following sections were reviewed and updated in the EMR as appropriate: PMH, SH, FH, Medications, Allergies. Allergies   Allergen Reactions    Amoxicillin Rash     Skin peeling      Review of Systems: A complete review of systems was obtained, reviewed, and scanned into the EMR. Pertinent findings reviewed above. Physical Exam:     Visit Vitals    /69 (BP 1 Location: Left arm, BP Patient Position: Sitting)    Pulse 96    Temp 98.4 °F (36.9 °C) (Oral)    Resp 16    Ht 5' 1\" (1.549 m)    Wt 121 lb (54.9 kg)    LMP 09/08/2018 (Exact Date)    SpO2 99%    BMI 22.86 kg/m2     General: No distress  Eyes: PERRLA, anicteric sclerae  HENT: Atraumatic, OP clear  Neck: Supple  Lymphatic: No cervical, supraclavicular, or inguinal adenopathy  Respiratory: CTAB, normal respiratory effort  CV: Normal rate, regular rhythm, no murmurs, no peripheral edema  GI: Soft, nontender, nondistended, no masses, no hepatomegaly, no splenomegaly  MS: Normal gait and station. Digits without clubbing or cyanosis.   Skin: No rashes, ecchymoses, or petechiae. Normal temperature, turgor, and texture. Psych: Alert, oriented, appropriate affect, normal judgment/insight    Results:     Lab Results   Component Value Date/Time    WBC 4.5 09/11/2018 09:21 AM    HGB 10.5 (L) 09/11/2018 09:21 AM    HCT 36.5 09/11/2018 09:21 AM    PLATELET 985 46/58/7162 09:21 AM    MCV 66 (L) 09/11/2018 09:21 AM    ABS. NEUTROPHILS 1.6 09/11/2018 09:21 AM     Lab Results   Component Value Date/Time    Sodium 138 09/11/2018 09:21 AM    Potassium 4.5 09/11/2018 09:21 AM    Chloride 101 09/11/2018 09:21 AM    CO2 25 09/11/2018 09:21 AM    Glucose 83 09/11/2018 09:21 AM    BUN 9 09/11/2018 09:21 AM    Creatinine 0.84 09/11/2018 09:21 AM    GFR est  09/11/2018 09:21 AM    GFR est non-AA 98 09/11/2018 09:21 AM    Calcium 9.3 09/11/2018 09:21 AM    Glucose (POC) 108 (H) 12/15/2013 07:26 AM     Lab Results   Component Value Date/Time    Bilirubin, total 0.2 09/11/2018 09:21 AM    ALT (SGPT) 12 09/11/2018 09:21 AM    AST (SGOT) 15 09/11/2018 09:21 AM    Alk.  phosphatase 63 09/11/2018 09:21 AM    Protein, total 7.1 09/11/2018 09:21 AM    Albumin 3.8 09/11/2018 09:21 AM    Globulin 3.7 11/11/2014 12:25 PM     Lab Results   Component Value Date/Time    Reticulocyte count 0.7 09/27/2016 04:29 PM    Iron % saturation 7 (LL) 02/21/2018 11:45 AM    TIBC 428 02/21/2018 11:45 AM    Ferritin 11 (L) 02/21/2018 11:45 AM    Haptoglobin 99 09/27/2016 04:29 PM     09/27/2016 04:29 PM    Sed rate (ESR) 59 (H) 10/20/2011 11:40 AM    TSH 0.837 09/11/2018 09:21 AM    Lipase 153 11/11/2014 12:25 PM     Lab Results   Component Value Date/Time    INR 1.1 12/16/2013 04:00 AM    aPTT 30.3 (H) 12/16/2013 04:00 AM    D-Dimer 0.28 02/15/2017 04:31 PM    Factor VIII Activity 139 07/29/2016 12:17 PM    von Willebrand Factor (vWF) Ag 104 07/29/2016 12:17 PM    vWF Activity 71 07/29/2016 12:17 PM     Ferritin   Date Value   02/21/2018 11 ng/mL (L)   08/09/2017 17 NG/ML   01/25/2017 79 ng/mL 09/27/2016 317 ng/mL (H)   08/22/2016 565 ng/mL (H)   07/29/2016 7 NG/ML (L)       9/27/2016:  Hemoglobin fractionation: normal    1/25/2017:  HGB: 13.4  WBC: 5.7  PLT: 260  MCV: 71  Iron saturation: 31%  Ferritin: 79    1/25/2017:  Smear: Microcytosis offset by a high normal RBC count, suggestive of minor thalassemia or innocent hemoglobinopathy.     US pelvis, US transvaginal 7/13/2016: normal      Assessment:   1) Anemia, iron deficiency  Resolved s/p IV iron 8/2016, though microcytosis has persisted raising some suspicions for alpha thal.  Now anemia is recurring once again. She has previously refused GI referral. Agrees to GI evaluation now. Repeat labs today, if iron deficiency is confirmed we will repeat IV iron as she has had good response in the past.     I encouraged her to continue to focus on iron rich foods. Continue folic acid. 2) Menorrhagia  Improved, off OCPs. VWF panel was negative. 3) Anxiety  Discussed non-pharmacologic ways to deal with stress and anxiety. She reports work related stress. Night shift in high-stress environment. Working upwards of 60 hrs/week during holidays. Recommended incorporating regular exercise, drinking plenty of water, stress reduction techniques (mindfulness, meditation and deep breathing). 4) Acid reflux  Concern for gastric ulcer. Refer to GI for further evaluation of symptoms and recurrent iron def anemia. PPI trial for 2 weeks. 5) Pica  Ice. Hope to see improvement with control of anemia. Plan:     · Labs now: CBC, iron profile, ferritin ([facility])  · GI referral  · Repeat IV iron depending on results of labs.   · Labs in 3 months: CBC, iron profile, ferritin ([facility])  · Return to see me in 3 months      Signed By: Paxton Champion MD

## 2018-09-20 DIAGNOSIS — D50.0 IRON DEFICIENCY ANEMIA DUE TO CHRONIC BLOOD LOSS: ICD-10-CM

## 2018-09-20 LAB
BASOPHILS # BLD AUTO: 0 X10E3/UL (ref 0–0.2)
BASOPHILS NFR BLD AUTO: 1 %
EOSINOPHIL # BLD AUTO: 0.5 X10E3/UL (ref 0–0.4)
EOSINOPHIL NFR BLD AUTO: 8 %
ERYTHROCYTE [DISTWIDTH] IN BLOOD BY AUTOMATED COUNT: 17.8 % (ref 12.3–15.4)
FERRITIN SERPL-MCNC: 7 NG/ML (ref 15–150)
HCT VFR BLD AUTO: 37.7 % (ref 34–46.6)
HGB BLD-MCNC: 11.2 G/DL (ref 11.1–15.9)
IMM GRANULOCYTES # BLD: 0 X10E3/UL (ref 0–0.1)
IMM GRANULOCYTES NFR BLD: 0 %
IRON SATN MFR SERPL: 3 % (ref 15–55)
IRON SERPL-MCNC: 13 UG/DL (ref 27–159)
LYMPHOCYTES # BLD AUTO: 2 X10E3/UL (ref 0.7–3.1)
LYMPHOCYTES NFR BLD AUTO: 34 %
MCH RBC QN AUTO: 18.7 PG (ref 26.6–33)
MCHC RBC AUTO-ENTMCNC: 29.7 G/DL (ref 31.5–35.7)
MCV RBC AUTO: 63 FL (ref 79–97)
MONOCYTES # BLD AUTO: 0.4 X10E3/UL (ref 0.1–0.9)
MONOCYTES NFR BLD AUTO: 7 %
NEUTROPHILS # BLD AUTO: 3 X10E3/UL (ref 1.4–7)
NEUTROPHILS NFR BLD AUTO: 50 %
PLATELET # BLD AUTO: 296 X10E3/UL (ref 150–379)
RBC # BLD AUTO: 5.98 X10E6/UL (ref 3.77–5.28)
TIBC SERPL-MCNC: 442 UG/DL (ref 250–450)
UIBC SERPL-MCNC: 429 UG/DL (ref 131–425)
WBC # BLD AUTO: 5.9 X10E3/UL (ref 3.4–10.8)

## 2018-09-20 RX ORDER — DIPHENHYDRAMINE HYDROCHLORIDE 50 MG/ML
50 INJECTION, SOLUTION INTRAMUSCULAR; INTRAVENOUS AS NEEDED
Status: CANCELLED
Start: 2018-09-27

## 2018-09-20 RX ORDER — SODIUM CHLORIDE 9 MG/ML
10 INJECTION INTRAMUSCULAR; INTRAVENOUS; SUBCUTANEOUS AS NEEDED
Status: CANCELLED | OUTPATIENT
Start: 2018-09-27

## 2018-09-20 RX ORDER — ACETAMINOPHEN 325 MG/1
650 TABLET ORAL AS NEEDED
Status: CANCELLED
Start: 2018-09-27

## 2018-09-20 RX ORDER — SODIUM CHLORIDE 9 MG/ML
10 INJECTION INTRAMUSCULAR; INTRAVENOUS; SUBCUTANEOUS AS NEEDED
Status: CANCELLED | OUTPATIENT
Start: 2018-10-04

## 2018-09-20 RX ORDER — ALBUTEROL SULFATE 0.83 MG/ML
2.5 SOLUTION RESPIRATORY (INHALATION) AS NEEDED
Status: CANCELLED
Start: 2018-10-04

## 2018-09-20 RX ORDER — EPINEPHRINE 1 MG/ML
0.3 INJECTION, SOLUTION, CONCENTRATE INTRAVENOUS AS NEEDED
Status: CANCELLED | OUTPATIENT
Start: 2018-09-27

## 2018-09-20 RX ORDER — DIPHENHYDRAMINE HYDROCHLORIDE 50 MG/ML
50 INJECTION, SOLUTION INTRAMUSCULAR; INTRAVENOUS AS NEEDED
Status: CANCELLED
Start: 2018-10-04

## 2018-09-20 RX ORDER — EPINEPHRINE 1 MG/ML
0.3 INJECTION, SOLUTION, CONCENTRATE INTRAVENOUS AS NEEDED
Status: CANCELLED | OUTPATIENT
Start: 2018-10-04

## 2018-09-20 RX ORDER — ACETAMINOPHEN 325 MG/1
650 TABLET ORAL AS NEEDED
Status: CANCELLED
Start: 2018-10-04

## 2018-09-20 RX ORDER — HYDROCORTISONE SODIUM SUCCINATE 100 MG/2ML
100 INJECTION, POWDER, FOR SOLUTION INTRAMUSCULAR; INTRAVENOUS AS NEEDED
Status: CANCELLED | OUTPATIENT
Start: 2018-09-27

## 2018-09-20 RX ORDER — HYDROCORTISONE SODIUM SUCCINATE 100 MG/2ML
100 INJECTION, POWDER, FOR SOLUTION INTRAMUSCULAR; INTRAVENOUS AS NEEDED
Status: CANCELLED | OUTPATIENT
Start: 2018-10-04

## 2018-09-20 RX ORDER — ONDANSETRON 2 MG/ML
8 INJECTION INTRAMUSCULAR; INTRAVENOUS AS NEEDED
Status: CANCELLED | OUTPATIENT
Start: 2018-10-04

## 2018-09-20 RX ORDER — ALBUTEROL SULFATE 0.83 MG/ML
2.5 SOLUTION RESPIRATORY (INHALATION) AS NEEDED
Status: CANCELLED
Start: 2018-09-27

## 2018-09-20 RX ORDER — ONDANSETRON 2 MG/ML
8 INJECTION INTRAMUSCULAR; INTRAVENOUS AS NEEDED
Status: CANCELLED | OUTPATIENT
Start: 2018-09-27

## 2018-09-27 ENCOUNTER — HOSPITAL ENCOUNTER (OUTPATIENT)
Dept: INFUSION THERAPY | Age: 23
Discharge: HOME | End: 2018-09-27
Payer: COMMERCIAL

## 2018-09-27 VITALS
HEART RATE: 83 BPM | DIASTOLIC BLOOD PRESSURE: 58 MMHG | SYSTOLIC BLOOD PRESSURE: 100 MMHG | TEMPERATURE: 98.4 F | RESPIRATION RATE: 18 BRPM

## 2018-09-27 DIAGNOSIS — D50.0 IRON DEFICIENCY ANEMIA DUE TO CHRONIC BLOOD LOSS: ICD-10-CM

## 2018-09-27 PROCEDURE — 96365 THER/PROPH/DIAG IV INF INIT: CPT

## 2018-09-27 PROCEDURE — 74011250636 HC RX REV CODE- 250/636: Performed by: NURSE PRACTITIONER

## 2018-09-27 RX ORDER — SODIUM CHLORIDE 9 MG/ML
10 INJECTION INTRAMUSCULAR; INTRAVENOUS; SUBCUTANEOUS AS NEEDED
Status: ACTIVE | OUTPATIENT
Start: 2018-09-27 | End: 2018-09-27

## 2018-09-27 RX ADMIN — FERRIC CARBOXYMALTOSE INJECTION 750 MG: 50 INJECTION, SOLUTION INTRAVENOUS at 12:05

## 2018-09-27 NOTE — PROGRESS NOTES
Outpatient Infusion Center Progress Note    1140 Pt admit to [place] for [facility] ambulatory in stable condition. Assessment completed. No new concerns voiced. PIV started in [facility] [place] with positive blood return. Medication ordered. Visit Vitals    /58    Pulse 83    Temp 98.4 °F (36.9 °C)    Resp 18    LMP 09/08/2018 (Exact Date)    Breastfeeding No       Medications:  Injectafer    1250 Pt tolerated treatment well. Pt monitored for 30 minutes post infusion without any s/s of reaction. D/c home ambulatory in no distress. Pt aware of next appointment scheduled for 10/4/18.

## 2018-10-02 ENCOUNTER — HOSPITAL ENCOUNTER (OUTPATIENT)
Dept: NON INVASIVE DIAGNOSTICS | Age: 23
Discharge: HOME | End: 2018-10-02
Attending: SPECIALIST
Payer: COMMERCIAL

## 2018-10-02 ENCOUNTER — OFFICE VISIT (OUTPATIENT)
Dept: CARDIOLOGY CLINIC | Age: 23
End: 2018-10-02

## 2018-10-02 VITALS
SYSTOLIC BLOOD PRESSURE: 104 MMHG | OXYGEN SATURATION: 99 % | RESPIRATION RATE: 16 BRPM | HEART RATE: 120 BPM | BODY MASS INDEX: 22.24 KG/M2 | HEIGHT: 61 IN | DIASTOLIC BLOOD PRESSURE: 72 MMHG | WEIGHT: 117.8 LBS

## 2018-10-02 DIAGNOSIS — R07.9 CHEST PAIN: ICD-10-CM

## 2018-10-02 DIAGNOSIS — Z98.890 S/P MVR (MITRAL VALVE REPAIR): Primary | ICD-10-CM

## 2018-10-02 DIAGNOSIS — I34.0 NON-RHEUMATIC MITRAL REGURGITATION: ICD-10-CM

## 2018-10-02 DIAGNOSIS — Z98.890 H/O MITRAL VALVE REPAIR: ICD-10-CM

## 2018-10-02 DIAGNOSIS — R06.02 SOB (SHORTNESS OF BREATH): ICD-10-CM

## 2018-10-02 PROCEDURE — 93306 TTE W/DOPPLER COMPLETE: CPT

## 2018-10-02 NOTE — MR AVS SNAPSHOT
1659 [facility] 600 1007 [facility] 
[phone] Patient: Sherlyn Devine MRN: [id number] [place] Visit Information Date & Time Provider Department Dept. Phone Encounter #  
 10/2/2018  3:40 PM Felicitas Albert MD [facility] Marciano Robin [phone] [id number] Follow-up Instructions Return in about 6 months (around 2019). Your Appointments 10/3/2018 11:20 AM  
ESTABLISHED PATIENT with Arlin Verduzco MD  
[facility] ([facility]) Appt Note: discuss paperwork to be completed [address] [id number] [phone]  
  
   
 [address] [address]  
  
    
 2018 10:30 AM  
ESTABLISHED PATIENT with Malachi Michelle MD  
[address] Delil[address] Cevallos at [address] [place] [address] [facility]) Appt Note: Joey/MARGE marroquin  
 [address], [address] [facility] 99 [address]  
[phone]  
  
   
 [address], [address] 1007 [facility] Upcoming Health Maintenance Date Due  
 HPV Age 9Y-34Y (2 of 1 - Female 3 Dose Series) 2011 DTaP/Tdap/Td series (1 - Tdap) 2016 PAP AKA CERVICAL CYTOLOGY 2016 Influenza Age 5 to Adult 2019* *Topic was postponed. The date shown is not the original due date. Allergies as of 10/2/2018  Review Complete On: 10/2/2018 By: Felicitas Albert MD  
  
 Severity Noted Reaction Type Reactions Amoxicillin  2011    Rash Skin peeling Current Immunizations  Reviewed on 2018 Name Date Human Papillomavirus 10/14/2011 Meningococcal Vaccine 2010 Not reviewed this visit You Were Diagnosed With   
  
 Codes Comments S/P MVR (mitral valve repair)    -  Primary ICD-10-CM: F35.820 ICD-9-CM: V45.89 Non-rheumatic mitral regurgitation     ICD-10-CM: I34.0 ICD-9-CM: 424.0 H/O mitral valve repair     ICD-10-CM: G56.421 ICD-9-CM: V15.1 Vitals BP Pulse Resp Height(growth percentile) Weight(growth percentile) LMP  
 104/72 (BP 1 Location: Left arm) (!) 120 16 5' 1\" (1.549 m) 117 lb 12.8 oz (53.4 kg) 09/08/2018 (Exact Date) SpO2 BMI OB Status Smoking Status 99% 22.26 kg/m2 Having regular periods Never Smoker Vitals History BMI and BSA Data Body Mass Index Body Surface Area  
 22.26 kg/m 2 1.52 m 2 Preferred Pharmacy Pharmacy Name Phone [address], 2014 [facility] Luzma Sofya [phone] Your Updated Medication List  
  
   
This list is accurate as of 10/2/18  4:32 PM.  Always use your most recent med list.  
  
  
  
  
 busPIRone 10 mg tablet Commonly known as:  BUSPAR Take 1 Tab by mouth two (2) times a day. clonazePAM 0.5 mg tablet Commonly known as:  Ashlee Bickers Take 1 Tab by mouth daily as needed. escitalopram oxalate 10 mg tablet Commonly known as:  Rolin Mess TAKE 1 TABLET BY MOUTH  DAILY  
  
 famotidine 20 mg tablet Commonly known as:  PEPCID Take 1 Tab by mouth nightly. We Performed the Following AMB POC EKG ROUTINE W/ 12 LEADS, INTER & REP [16084 CPT(R)] Follow-up Instructions Return in about 6 months (around 4/2/2019). To-Do List   
 10/04/2018  1:00 PM  
  Appointment with SS INF5 CH2 <4H at [place] [address] ([phone]) [facility] [place] [facility]! Dear Orin Figueroa: Thank you for requesting a theAudience account. Our records indicate that you already have an active theAudience account. You can access your account anytime at [email or web address] Did you know that you can access your hospital and ER discharge instructions at any time in theAudience? You can also review all of your test results from your hospital stay or ER visit. Additional Information If you have questions, please visit the Frequently Asked Questions section of the [employer] website at [email or web address] Remember, [employer] is NOT to be used for urgent needs. For medical emergencies, dial 911. Now available from your iPhone and Android! Please provide this summary of care documentation to your next provider. Your primary care clinician is listed as KM PATE. If you have any questions after today's visit, please call [phone].

## 2018-10-02 NOTE — PROGRESS NOTES
LAST OFFICE VISIT : 9/6/2017        ICD-10-CM ICD-9-CM   1. S/P MVR (mitral valve repair) Z98.890 V45.89   2. Non-rheumatic mitral regurgitation I34.0 424.0   3. H/O mitral valve repair Z98.890 V15.1            Latasha Cline is a 21 y.o. female with non-rheumatic mitral regurgitation s/p MVR referred for follow up. Cardiac risk factors: none  I have personally obtained the history from the patient. HISTORY OF PRESENTING ILLNESS     Overall the pt states she is doing well. Pt does not exercise regularly. Pt states that she feels fatigued and she has been having low BP, and she states that her BP is lower than it used be. Pt has an incoming GI appointment. Pt had an iron transfusion last week for anemia. Pt had an echo today and showed normal results. The patient denies chest pain/ shortness of breath, orthopnea, PND, LE edema, palpitations, syncope, presyncope.          ACTIVE PROBLEM LIST     Patient Active Problem List    Diagnosis Date Noted    S/P MVR (mitral valve repair) 12/13/2013    Mitral regurgitation 12/02/2013    Anemia, iron deficiency 01/16/2012    Scoliosis 12/07/2010           PAST MEDICAL HISTORY     Past Medical History:   Diagnosis Date    Anemia     Murmur     Valvular heart disease            PAST SURGICAL HISTORY     Past Surgical History:   Procedure Laterality Date    CARDIAC SURG PROCEDURE UNLIST            ALLERGIES     Allergies   Allergen Reactions    Amoxicillin Rash     Skin peeling          FAMILY HISTORY     Family History   Problem Relation Age of Onset    Cancer Father      LYMPHOMA    Heart Disease Father     Other Mother     Arthritis-osteo Mother     Kidney Disease Mother      ANKYLOSING SPONDYLITIS    Diabetes Maternal Grandmother     Hypertension Maternal Grandmother     Kidney Disease Maternal Grandmother     Cancer Maternal Aunt      LUNG CA - SMOKER    Other Maternal Aunt      SARCOIDOSIS    Anesth Problems Neg Hx     negative for cardiac disease       SOCIAL HISTORY     Social History     Social History    Marital status: SINGLE     Spouse name: N/A    Number of children: N/A    Years of education: N/A     Social History Main Topics    Smoking status: Never Smoker    Smokeless tobacco: Never Used    Alcohol use No      Comment: rarely    Drug use: No    Sexual activity: No     Other Topics Concern    None     Social History Narrative         MEDICATIONS     Current Outpatient Prescriptions   Medication Sig    famotidine (PEPCID) 20 mg tablet Take 1 Tab by mouth nightly.  busPIRone (BUSPAR) 10 mg tablet Take 1 Tab by mouth two (2) times a day.  escitalopram oxalate (LEXAPRO) 10 mg tablet TAKE 1 TABLET BY MOUTH  DAILY    clonazePAM (KLONOPIN) 0.5 mg tablet Take 1 Tab by mouth daily as needed. No current facility-administered medications for this visit. I have reviewed the nurses notes, vitals, problem list, allergy list, medical history, family, social history and medications. REVIEW OF SYMPTOMS      General: Pt denies excessive weight gain or loss. Pt is able to conduct ADL's  HEENT: Denies blurred vision, headaches, hearing loss, epistaxis and difficulty swallowing. Respiratory: Denies cough, congestion, shortness of breath, WONG, wheezing or stridor. Cardiovascular: Denies precordial pain, palpitations, edema or PND  Gastrointestinal: Denies poor appetite, indigestion, abdominal pain or blood in stool  Genitourinary: Denies hematuria, dysuria, increased urinary frequency  Musculoskeletal: Denies joint pain or swelling from muscles or joints  Neurologic: Denies tremor, paresthesias, headache, or sensory motor disturbance  Psychiatric: Denies confusion, insomnia, depression  Integumentray: Denies rash, itching or ulcers.   Hematologic: Denies easy bruising, bleeding     PHYSICAL EXAMINATION      Vitals:    10/02/18 1557   BP: 104/72   Pulse: (!) 120   Resp: 16   SpO2: 99%   Weight: 117 lb 12.8 oz (53.4 kg) Height: 5' 1\" (1.549 m)     General: Well developed, in no acute distress. HEENT: No jaundice, oral mucosa moist, no oral ulcers  Neck: Supple, no stiffness, no lymphadenopathy, supple  Heart:  Normal S1/S2 negative S3 or S4. Regular, no murmur, gallop or rub, no jugular venous distention  Respiratory: Clear bilaterally x 4, no wheezing or rales  Abdomen:   Soft, non-tender, bowel sounds are active.   Extremities:  No edema, normal cap refill, no cyanosis. Musculoskeletal: No clubbing, no deformities  Neuro: A&Ox3, speech clear, gait stable, cooperative, no focal neurologic deficits  Skin: Skin color is normal. No rashes or lesions. Non diaphoretic, moist.  Vascular: 2+ pulses symmetric in all extremities        EKG: NSR     DIAGNOSTIC DATA     1. Echocardiograms (3/27/12)   SUMMARY:  Left ventricle: Systolic function was normal. Ejection fraction was  estimated in the range of 55 % to 60 %. There were no regional wall motion  Abnormalities.         SUMMARY: (11/18/13)  Left ventricle: Systolic function was normal. Ejection fraction was  estimated in the range of 60 % to 65 %. There were no regional wall motion  abnormalities. Mitral valve: Flail anterior mitral valve leaflet. There was a marked  prolapse involving the medial segment of the anterior leaflet. There was  moderate to severe regurgitation. Tricuspid valve: There was mild regurgitation.    (3/5/14)  SUMMARY:  Left ventricle: Systolic function was normal. Ejection fraction was  estimated in the range of 55 % to 60 %. There were no regional wall motion  abnormalities. Mitral valve: Unable to get mean gradient across the MV due to tachy heart  rate. Tricuspid valve: There was mild regurgitation. 2. Lipids  2/10/17- , HDL 59, , TG 40  4/11/17- , HDL 62, , TG 47  9/11/18- , HDL 69, LDL 81, TG 38    3.  Holter  3/5/14- ST          LABORATORY DATA            Lab Results   Component Value Date/Time    WBC 5.9 09/19/2018 10:27 AM    HGB 11.2 09/19/2018 10:27 AM    HCT 37.7 09/19/2018 10:27 AM    PLATELET 535 70/12/1802 10:27 AM    MCV 63 (L) 09/19/2018 10:27 AM      Lab Results   Component Value Date/Time    Sodium 138 09/11/2018 09:21 AM    Potassium 4.5 09/11/2018 09:21 AM    Chloride 101 09/11/2018 09:21 AM    CO2 25 09/11/2018 09:21 AM    Anion gap 7 11/11/2014 12:25 PM    Glucose 83 09/11/2018 09:21 AM    BUN 9 09/11/2018 09:21 AM    Creatinine 0.84 09/11/2018 09:21 AM    BUN/Creatinine ratio 11 09/11/2018 09:21 AM    GFR est  09/11/2018 09:21 AM    GFR est non-AA 98 09/11/2018 09:21 AM    Calcium 9.3 09/11/2018 09:21 AM    Bilirubin, total 0.2 09/11/2018 09:21 AM    AST (SGOT) 15 09/11/2018 09:21 AM    Alk. phosphatase 63 09/11/2018 09:21 AM    Protein, total 7.1 09/11/2018 09:21 AM    Albumin 3.8 09/11/2018 09:21 AM    Globulin 3.7 11/11/2014 12:25 PM    A-G Ratio 1.2 09/11/2018 09:21 AM    ALT (SGPT) 12 09/11/2018 09:21 AM           ASSESSMENT/RECOMMENDATIONS:.      1. Non-rheumatic mitral regurgitation s/p valve repair 2014  - Echo today was normal with normal appearing repair and normal EF. 2. Tachycardia  - HR is 102 today but it's 80 most of the time. Follow up with her pulse, if still persistent, call. Will consider placing her on Corlanol but will discuss with Dr. Pickard  first.    3. Return in 1 year or PRN    Orders Placed This Encounter    AMB POC EKG ROUTINE W/ 12 LEADS, INTER & REP     Order Specific Question:   Reason for Exam:     Answer:   S/P MVR        Follow-up Disposition:  Return in about 6 months (around 4/2/2019). I have discussed the diagnosis with  Justin Brand and the intended plan as seen in the above orders. Questions were answered concerning future plans. I have discussed medication side effects and warnings with the patient as well. Thank you,  Chrales Conner MD for involving me in the care of  Justin Brand.  Please do not hesitate to contact me for further questions/concerns. Written by Alyse Raza, as dictated by Segundo Dennis MD.     Nawaf Marino MD, [facility]    Patient Care Team:  Ifeoma Griffin MD as PCP - General  Lucien Morris LPN as Ambulatory Care Navigator  Segundo Dennis MD as Referring Provider (Cardiology)  Ana Maciel MD (Cardiothoracic Surgery)  Federica Meza MD (Hematology and Oncology)    [address], [address] [place] [address]      [phone] / [phone] Fax

## 2018-10-02 NOTE — PROGRESS NOTES
Visit Vitals    /72 (BP 1 Location: Left arm)    Pulse (!) 120    Resp 16    Ht 5' 1\" (1.549 m)    Wt 117 lb 12.8 oz (53.4 kg)    LMP 09/08/2018 (Exact Date)    SpO2 99%    BMI 22.26 kg/m2

## 2018-10-04 ENCOUNTER — HOSPITAL ENCOUNTER (OUTPATIENT)
Dept: INFUSION THERAPY | Age: 23
Discharge: HOME | End: 2018-10-04
Payer: COMMERCIAL

## 2018-10-04 VITALS
HEART RATE: 94 BPM | TEMPERATURE: 98.7 F | RESPIRATION RATE: 18 BRPM | SYSTOLIC BLOOD PRESSURE: 114 MMHG | DIASTOLIC BLOOD PRESSURE: 56 MMHG

## 2018-10-04 DIAGNOSIS — D50.0 IRON DEFICIENCY ANEMIA DUE TO CHRONIC BLOOD LOSS: ICD-10-CM

## 2018-10-04 PROCEDURE — 96365 THER/PROPH/DIAG IV INF INIT: CPT

## 2018-10-04 PROCEDURE — 74011250636 HC RX REV CODE- 250/636: Performed by: NURSE PRACTITIONER

## 2018-10-04 RX ORDER — SODIUM CHLORIDE 9 MG/ML
10 INJECTION INTRAMUSCULAR; INTRAVENOUS; SUBCUTANEOUS AS NEEDED
Status: ACTIVE | OUTPATIENT
Start: 2018-10-04 | End: 2018-10-05

## 2018-10-04 RX ADMIN — SODIUM CHLORIDE 10 ML: 9 INJECTION INTRAMUSCULAR; INTRAVENOUS; SUBCUTANEOUS at 14:43

## 2018-10-04 RX ADMIN — FERRIC CARBOXYMALTOSE INJECTION 750 MG: 50 INJECTION, SOLUTION INTRAVENOUS at 14:16

## 2018-10-04 RX ADMIN — SODIUM CHLORIDE 10 ML: 9 INJECTION INTRAMUSCULAR; INTRAVENOUS; SUBCUTANEOUS at 13:43

## 2018-10-04 NOTE — PROGRESS NOTES
[facility] Progress Note    Date: 2018    Name: Arlin Davies    MRN: [id number]         : 1995    Ms. Jonathan Nolen Arrived ambulatory and in no distress for Dose 2/2 Injectafer. Assessment was completed, no acute issues at this time, no new complaints voiced. 24 G PIV established R AC without difficulty, + blood return. Ms. Maher's vitals were reviewed. Patient Vitals for the past 12 hrs:   Temp Pulse Resp BP   10/04/18 1445 98.7 °F (37.1 °C) 94 18 114/56   10/04/18 1339 98.7 °F (37.1 °C) (!) 107 18 123/58         Medications:  Injectafer IV      Ms. Jonathan Nolen tolerated treatment well and was discharged from [place] [address] in stable condition. PIV flushed & removed. No further appointments needed at this time.      Candace Macario RN  2018

## 2018-10-09 ENCOUNTER — OFFICE VISIT (OUTPATIENT)
Dept: FAMILY MEDICINE CLINIC | Age: 23
End: 2018-10-09

## 2018-10-09 VITALS
BODY MASS INDEX: 22.09 KG/M2 | HEIGHT: 61 IN | DIASTOLIC BLOOD PRESSURE: 68 MMHG | OXYGEN SATURATION: 99 % | WEIGHT: 117 LBS | TEMPERATURE: 98.1 F | HEART RATE: 86 BPM | RESPIRATION RATE: 16 BRPM | SYSTOLIC BLOOD PRESSURE: 105 MMHG

## 2018-10-09 DIAGNOSIS — F41.9 ANXIETY: ICD-10-CM

## 2018-10-09 DIAGNOSIS — D50.9 IRON DEFICIENCY ANEMIA, UNSPECIFIED IRON DEFICIENCY ANEMIA TYPE: Primary | ICD-10-CM

## 2018-10-09 NOTE — MR AVS SNAPSHOT
[address] [place] [address] 
[phone] Patient: Jyoti Cordon MRN: [id number] JFM:1/94/8200 Visit Information Date & Time Provider Department Dept. Phone Encounter #  
 10/9/2018 10:50 AM Johnathan Frazier MD 5900 [facility] [phone] [id number] Your Appointments 12/18/2018 10:30 AM  
ESTABLISHED PATIENT with Giselle Powers MD  
[facility] Appt Note: Cook/raddin, MARGE  
 [address] [facility] [address]  
[phone]  
  
   
 [address] [facility] [address] 100[address] [facility]  
  
    
 10/8/2019  3:20 PM  
ESTABLISHED PATIENT with Savage Garcia MD  
[facility] ([facility]) Appt Note: annual  
 [address] [facility] [address] 600 1007 [facility]  
[address] [facility] [address] [address] Upcoming Health Maintenance Date Due  
 HPV Age 9Y-34Y (2 of 1 - Female 3 Dose Series) 12/9/2011 DTaP/Tdap/Td series (1 - Tdap) 6/14/2016 PAP AKA CERVICAL CYTOLOGY 6/14/2016 Influenza Age 5 to Adult 9/11/2019* *Topic was postponed. The date shown is not the original due date. Allergies as of 10/9/2018  Review Complete On: 10/9/2018 By: Johnathan Frazier MD  
  
 Severity Noted Reaction Type Reactions Amoxicillin  11/02/2011    Rash Skin peeling Current Immunizations  Reviewed on 10/4/2018 Name Date Human Papillomavirus 10/14/2011 Meningococcal Vaccine 9/21/2010 Not reviewed this visit You Were Diagnosed With   
  
 Codes Comments Iron deficiency anemia, unspecified iron deficiency anemia type    -  Primary ICD-10-CM: D50.9 ICD-9-CM: 280.9 Anxiety     ICD-10-CM: F41.9 ICD-9-CM: 300.00 Vitals BP Pulse Temp Resp Height(growth percentile) Weight(growth percentile) 105/68 86 98.1 °F (36.7 °C) (Oral) 16 5' 1\" (1.549 m) 117 lb (53.1 kg) LMP SpO2 BMI OB Status Smoking Status 10/03/2018 (Exact Date) 99% 22.11 kg/m2 Having regular periods Never Smoker BMI and BSA Data Body Mass Index Body Surface Area  
 22.11 kg/m 2 1.51 m 2 Preferred Pharmacy Pharmacy Name Phone [address], 2014 [facility] Jerilyn [place] [phone] Your Updated Medication List  
  
   
This list is accurate as of 10/9/18 11:50 AM.  Always use your most recent med list.  
  
  
  
  
 busPIRone 10 mg tablet Commonly known as:  BUSPAR Take 1 Tab by mouth two (2) times a day. clonazePAM 0.5 mg tablet Commonly known as:  Edgar Meals Take 1 Tab by mouth daily as needed. escitalopram oxalate 10 mg tablet Commonly known as:  Alena Cunas TAKE 1 TABLET BY MOUTH  DAILY  
  
 famotidine 20 mg tablet Commonly known as:  PEPCID Take 1 Tab by mouth nightly. Introducing \[facility]\"" & HEALTH SERVICES! Dear Alanna Edouard: Thank you for requesting a [employer] account. Our records indicate that you already have an active [employer] account. You can access your account anytime at [email or web address] Did you know that you can access your hospital and ER discharge instructions at any time in [employer]? You can also review all of your test results from your hospital stay or ER visit. Additional Information If you have questions, please visit the Frequently Asked Questions section of the [employer] website at [email or web address] Remember, [employer] is NOT to be used for urgent needs. For medical emergencies, dial 911. Now available from your iPhone and Android! Please provide this summary of care documentation to your next provider. Your primary care clinician is listed as Suraj Burgess. If you have any questions after today's visit, please call [phone].

## 2018-10-09 NOTE — PROGRESS NOTES
1. Have you been to the ER, urgent care clinic since your last visit? Hospitalized since your last visit? No    2. Have you seen or consulted any other health care providers outside of the [address] [place] [address] since your last visit? Include any pap smears or colon screening. No     Chief Complaint   Patient presents with    Form Completion     Absence from work     Pt reports that she was advised to take a medical leave by her employer due to anxiety. Pt's last day was 9/26/18, pt plans to go back on 10/17/18. Pt reports that she has gotten iron transfusion while she was out. Pt also saw cardiology. Pt reports that weakness from low iron exacerbated anxiety. Pt reports that she feels as though the time off and the infusion have prepared her to return to work. Pt states that being out of work has improved her anxiety, thinks that she will be able to better handle it upon her return. Subjective: (As above and below)     Chief Complaint   Patient presents with    Form Completion     Absence from work     she is a 21y.o. year old female who presents for evaluation. Reviewed PmHx, RxHx, FmHx, SocHx, AllgHx and updated in chart.     Review of Systems - negative except as listed above    Objective:     Vitals:    10/09/18 1117   BP: 105/68   Pulse: 86   Resp: 16   Temp: 98.1 °F (36.7 °C)   TempSrc: Oral   SpO2: 99%   Weight: 117 lb (53.1 kg)   Height: 5' 1\" (1.549 m)     Physical Examination: General appearance - alert, well appearing, and in no distress  Mental status - normal mood, behavior, speech, dress, motor activity, and thought processes  Mouth - mucous membranes moist, pharynx normal without lesions  Chest - clear to auscultation, no wheezes, rales or rhonchi, symmetric air entry  Heart - normal rate, regular rhythm, normal S1, S2, no murmurs, rubs, clicks or gallops  Musculoskeletal - no joint tenderness, deformity or swelling  Extremities - peripheral pulses normal, no pedal edema, no clubbing or cyanosis    Assessment/ Plan:   1. Iron deficiency anemia, unspecified iron deficiency anemia type  -followed by heme/onc  -keep follow up as scheduled    2. Anxiety  -continue on current medication  -encouraged pt to consider therapy to work on strategies for anxiety management. Follow-up Disposition: As needed  I have discussed the diagnosis with the patient and the intended plan as seen in the above orders. The patient has received an after-visit summary and questions were answered concerning future plans. Medication Side Effects and Warnings were discussed with patient: yes  Patient Labs were reviewed: yes  Patient Past Records were reviewed:  yes    Sue Aguayo M.D. [facility]

## 2018-10-12 ENCOUNTER — TELEPHONE (OUTPATIENT)
Dept: FAMILY MEDICINE CLINIC | Age: 23
End: 2018-10-12

## 2018-10-12 NOTE — TELEPHONE ENCOUNTER
Patient just wanted to give FYI that she will be dropping off new [place] paper work to complete.  Yue Ascencio

## 2018-10-12 NOTE — TELEPHONE ENCOUNTER
Patient called in and would like a call back to discuss a few things with you. Patient did not give specific info.   Phone [phone]

## 2018-11-29 ENCOUNTER — OFFICE VISIT (OUTPATIENT)
Dept: FAMILY MEDICINE CLINIC | Age: 23
End: 2018-11-29

## 2018-11-29 VITALS
WEIGHT: 118 LBS | RESPIRATION RATE: 18 BRPM | HEART RATE: 82 BPM | HEIGHT: 61 IN | BODY MASS INDEX: 22.28 KG/M2 | TEMPERATURE: 98.2 F | SYSTOLIC BLOOD PRESSURE: 107 MMHG | OXYGEN SATURATION: 98 % | DIASTOLIC BLOOD PRESSURE: 70 MMHG

## 2018-11-29 DIAGNOSIS — F41.1 GAD (GENERALIZED ANXIETY DISORDER): ICD-10-CM

## 2018-11-29 DIAGNOSIS — F41.9 ANXIETY: ICD-10-CM

## 2018-11-29 RX ORDER — ESCITALOPRAM OXALATE 20 MG/1
20 TABLET ORAL DAILY
Qty: 30 TAB | Refills: 5 | Status: SHIPPED | OUTPATIENT
Start: 2018-11-29 | End: 2019-07-15 | Stop reason: ALTCHOICE

## 2018-11-29 RX ORDER — BUSPIRONE HYDROCHLORIDE 10 MG/1
10 TABLET ORAL 2 TIMES DAILY
Qty: 60 TAB | Refills: 2 | Status: SHIPPED | OUTPATIENT
Start: 2018-11-29 | End: 2019-06-23 | Stop reason: SDUPTHER

## 2018-11-29 NOTE — PATIENT INSTRUCTIONS
[facility]  [facility] [address]  Phone [phone]    For therapy  Rosita Franklin LCSW  [facility]  [address] [facility] [address]  [phone]     Panic, Anxiety, and Depression [address], [facility]  [phone]    [address] [place] [address]  [address] [place] [address] [facility], [address]  Phone: [phone]  Fax: [phone]    [facility] [address]  Phone: [phone]  Fax: [phone] [address] [facility], [address] [place] [address]  Phone: [phone]  Fax: [phone]

## 2018-11-29 NOTE — PROGRESS NOTES
Chief Complaint   Patient presents with    Anxiety    Medication Evaluation     Patient in office today to discuss anxiety. Pt would like to discuss increasing lexapro. States medication has stopped helping with anxiety a couple months ago. Pt states that her anxiety has been out of control. Has had 3-4 anxiety attacks in the last month making her miss work. Has a lot of stressors at home and at work. Stressing over making sure she is doing things right. Pt had a leave of absence and returned to work in October. Felt like she was ready to return but has had ongoing issues since being back. Was told she would be moved a new department that would be more accommodating to her needs. Pts manager recommended she be out of work for 2 weeks. Pt was prescribed buspar previously but never tried taking it. Has not previously seen a therapist for this. December 17th expected return to work day. Works at [employer] Pt is currently stowing, would like to move to [place]. Denies any other concerns at this time. Chief Complaint   Patient presents with    Anxiety    Medication Evaluation     she is a 21y.o. year old female who presents for evalution. Reviewed PmHx, RxHx, FmHx, SocHx, AllgHx and updated and dated in the chart. Review of Systems - negative except as listed above in the HPI    Objective:     Vitals:    11/29/18 1549   BP: 107/70   Pulse: 82   Resp: 18   Temp: 98.2 °F (36.8 °C)   TempSrc: Oral   SpO2: 98%   Weight: 118 lb (53.5 kg)   Height: 5' 1\" (1.549 m)     Physical Examination: General appearance - alert, well appearing, and in no distress  Mental status - depressed mood, anxious  Chest - clear to auscultation, no wheezes, rales or rhonchi, symmetric air entry  Heart - normal rate, regular rhythm, normal S1, S2, no murmurs    Assessment/ Plan:   Diagnoses and all orders for this visit:    1. MEDHAT (generalized anxiety disorder) / 2.  Anxiety  -     escitalopram oxalate (LEXAPRO) 20 mg tablet; Take 1 Tab by mouth daily. -     busPIRone (BUSPAR) 10 mg tablet; Take 1 Tab by mouth two (2) times a day. Increase lexapro to 20 mg daily. Recommended pt try the buspar BID to help better control her anxiety sx. Will complete FMLA. Advised pt that she must est care with a therapist for further evaluation of sx. Follow up in 2 weeks prior to returning to work. Follow-up Disposition:  Return if symptoms worsen or fail to improve. I have discussed the diagnosis with the patient and the intended plan as seen in the above orders. The patient has received an after-visit summary and questions were answered concerning future plans. Medication Side Effects and Warnings were discussed with patient: yes  Patient Labs were reviewed and or requested: no  Patient Past Records were reviewed and or requested  yes  Patient / Caregiver Understanding of treatment plan was verbalized during office visit YES    MURIEL Wallace    Patient Instructions   [facility] [address] [address],[address]  [facility]  Phone [phone]    For therapy  Lu Calero LCSW  [facility]  [address] [place] [address]   [place], [address]  [phone]     [facility] [address], [facility]  [phone]    [address] [place] [address]  [address] [facility] Narinder Lola, [address]  Phone: [phone]  Fax: [phone]    [address] [place] [address]  Phone: [phone]  Fax: [phone] [facility]8 [facility], [address] [place] [address]  Phone: [phone]  Fax: [phone]

## 2018-11-29 NOTE — PROGRESS NOTES
Chief Complaint   Patient presents with    Anxiety    Medication Evaluation     Patient in office today to discuss anxiety. Pt would like to discuss increasing lexapro. States medication has stopped helping with anxiety a couple months ago.

## 2018-12-03 ENCOUNTER — DOCUMENTATION ONLY (OUTPATIENT)
Dept: FAMILY MEDICINE CLINIC | Age: 23
End: 2018-12-03

## 2018-12-03 NOTE — PROGRESS NOTES
Pt has answered questions:  States yes it is hard for her to sleep, hard to stay asleep, on average get anywhere 3-4hrs sleep per night. Yes anxiety makes if very hard to clean and household chores, because it makes her very tired does not want to do anything. Pt states she is able to complete routine shopping and pay bills on time. Pt states she does notice socialization issues, does not want to be around anyone.

## 2018-12-03 NOTE — PROGRESS NOTES
I need clarification on a few things. Has anxiety caused sleep disturbance, if yes how? Has anxiety caused problems with cleaning or maintaining her residence? If so, how? Is she able to complete routine shopping and pay her bills on time? Any socialization issues like social phobia? Once these questions have been answered LA will be complete and ready for submission.

## 2018-12-03 NOTE — PROGRESS NOTES
LA paper work for completion left in [employer] bin in the front office.  Needs to be faxed by us, fax # on form    [phone] pt's cell

## 2018-12-04 NOTE — PROGRESS NOTES
Faxed FMLA form to [facility], [facility]. @ [phone]. Confirmation number [id number]. Original form placed in scan folder for central scanning. Copy has been placed in outgoing mail for pt records.

## 2018-12-15 LAB
BASOPHILS # BLD AUTO: 0 X10E3/UL (ref 0–0.2)
BASOPHILS NFR BLD AUTO: 1 %
EOSINOPHIL # BLD AUTO: 0.5 X10E3/UL (ref 0–0.4)
EOSINOPHIL NFR BLD AUTO: 13 %
ERYTHROCYTE [DISTWIDTH] IN BLOOD BY AUTOMATED COUNT: 18 % (ref 12.3–15.4)
FERRITIN SERPL-MCNC: 263 NG/ML (ref 15–150)
HCT VFR BLD AUTO: 42 % (ref 34–46.6)
HGB BLD-MCNC: 12.8 G/DL (ref 11.1–15.9)
IMM GRANULOCYTES # BLD: 0 X10E3/UL (ref 0–0.1)
IMM GRANULOCYTES NFR BLD: 0 %
IRON SATN MFR SERPL: 50 % (ref 15–55)
IRON SERPL-MCNC: 126 UG/DL (ref 27–159)
LYMPHOCYTES # BLD AUTO: 1.5 X10E3/UL (ref 0.7–3.1)
LYMPHOCYTES NFR BLD AUTO: 40 %
MCH RBC QN AUTO: 21.8 PG (ref 26.6–33)
MCHC RBC AUTO-ENTMCNC: 30.5 G/DL (ref 31.5–35.7)
MCV RBC AUTO: 72 FL (ref 79–97)
MONOCYTES # BLD AUTO: 0.2 X10E3/UL (ref 0.1–0.9)
MONOCYTES NFR BLD AUTO: 6 %
NEUTROPHILS # BLD AUTO: 1.5 X10E3/UL (ref 1.4–7)
NEUTROPHILS NFR BLD AUTO: 40 %
PLATELET # BLD AUTO: 241 X10E3/UL (ref 150–379)
RBC # BLD AUTO: 5.86 X10E6/UL (ref 3.77–5.28)
TIBC SERPL-MCNC: 250 UG/DL (ref 250–450)
UIBC SERPL-MCNC: 124 UG/DL (ref 131–425)
WBC # BLD AUTO: 3.7 X10E3/UL (ref 3.4–10.8)

## 2018-12-18 ENCOUNTER — OFFICE VISIT (OUTPATIENT)
Dept: FAMILY MEDICINE CLINIC | Age: 23
End: 2018-12-18

## 2018-12-18 ENCOUNTER — OFFICE VISIT (OUTPATIENT)
Dept: ONCOLOGY | Age: 23
End: 2018-12-18

## 2018-12-18 VITALS
HEART RATE: 93 BPM | HEIGHT: 61 IN | SYSTOLIC BLOOD PRESSURE: 121 MMHG | RESPIRATION RATE: 14 BRPM | OXYGEN SATURATION: 100 % | WEIGHT: 117 LBS | BODY MASS INDEX: 22.09 KG/M2 | TEMPERATURE: 97 F | DIASTOLIC BLOOD PRESSURE: 56 MMHG

## 2018-12-18 VITALS
SYSTOLIC BLOOD PRESSURE: 102 MMHG | HEART RATE: 91 BPM | BODY MASS INDEX: 21.52 KG/M2 | RESPIRATION RATE: 18 BRPM | TEMPERATURE: 98.5 F | DIASTOLIC BLOOD PRESSURE: 67 MMHG | HEIGHT: 61 IN | WEIGHT: 114 LBS | OXYGEN SATURATION: 99 %

## 2018-12-18 DIAGNOSIS — D50.9 IRON DEFICIENCY ANEMIA, UNSPECIFIED IRON DEFICIENCY ANEMIA TYPE: Primary | ICD-10-CM

## 2018-12-18 DIAGNOSIS — F41.1 GAD (GENERALIZED ANXIETY DISORDER): Primary | ICD-10-CM

## 2018-12-18 RX ORDER — ESCITALOPRAM OXALATE 10 MG/1
TABLET ORAL
Qty: 90 TAB | Refills: 1 | Status: SHIPPED | OUTPATIENT
Start: 2018-12-18 | End: 2019-02-05

## 2018-12-18 NOTE — PROGRESS NOTES
Lucreciatruman Devine is a 21 y.o. female follow up for anemia. 1. Have you been to the ER, urgent care clinic since your last visit? Hospitalized since your last visit? No    2. Have you seen or consulted any other health care providers outside of the [address] [place] [address] since your last visit? Include any pap smears or colon screening.  No

## 2018-12-18 NOTE — PROGRESS NOTES
Chief Complaint   Patient presents with    Follow-up     anxiety     Pt in office today for follow up for anxiety  Pt will be returning to work tomorrow and needs a doctors note    Pt has no other concerns

## 2018-12-18 NOTE — PROGRESS NOTES
[facility] at [facility]  [address] [facility], [address] [place] [address] Road [address]  Krissy Radha: [phone]  F: [phone]      Reason for Visit:   Sonal Dobson is a 21 y.o. female who is seen for follow up of anemia. History of Present Illness:   Presents today for follow up. Feeling well. Did well with IV iron. Has been off work in the last 3 weeks due to taking FMLA for anxiety exacerbation. Has been receiving help from PCP and will initiate counseling soon. Planning to return to work tomorrow. No SOB, chest pain, dizziness or headache. Has not been eating ice like she was. Tolerated IV iron without difficulty. Regular menses, lasting about 4-5 days. Lighter than normal. 9/8/2018 LMP. PAST HISTORY: The following sections were reviewed and updated in the EMR as appropriate: PMH, SH, FH, Medications, Allergies. Allergies   Allergen Reactions    Amoxicillin Rash     Skin peeling      Review of Systems: A complete review of systems was obtained, reviewed, and scanned into the EMR. Pertinent findings reviewed above. Physical Exam:     Visit Vitals  /56 (BP 1 Location: Right arm, BP Patient Position: Sitting)   Pulse 93   Temp 97 °F (36.1 °C) (Temporal)   Resp 14   Ht 5' 1\" (1.549 m)   Wt 117 lb (53.1 kg)   LMP 11/18/2018   SpO2 100%   BMI 22.11 kg/m²     General: No distress  Eyes: PERRLA, anicteric sclerae  HENT: Atraumatic, OP clear  Neck: Supple  Lymphatic: No cervical, supraclavicular, or inguinal adenopathy  Respiratory: CTAB, normal respiratory effort  CV: Normal rate, regular rhythm, no murmurs, no peripheral edema  GI: Soft, nontender, nondistended, no masses, no hepatomegaly, no splenomegaly  MS: Normal gait and station. Digits without clubbing or cyanosis. Skin: No rashes, ecchymoses, or petechiae. Normal temperature, turgor, and texture.   Psych: Alert, oriented, appropriate affect, normal judgment/insight    Results:     Lab Results   Component Value Date/Time WBC 3.7 12/14/2018 04:29 PM    HGB 12.8 12/14/2018 04:29 PM    HCT 42.0 12/14/2018 04:29 PM    PLATELET 426 12/48/3893 04:29 PM    MCV 72 (L) 12/14/2018 04:29 PM    ABS. NEUTROPHILS 1.5 12/14/2018 04:29 PM     Lab Results   Component Value Date/Time    Sodium 138 09/11/2018 09:21 AM    Potassium 4.5 09/11/2018 09:21 AM    Chloride 101 09/11/2018 09:21 AM    CO2 25 09/11/2018 09:21 AM    Glucose 83 09/11/2018 09:21 AM    BUN 9 09/11/2018 09:21 AM    Creatinine 0.84 09/11/2018 09:21 AM    GFR est  09/11/2018 09:21 AM    GFR est non-AA 98 09/11/2018 09:21 AM    Calcium 9.3 09/11/2018 09:21 AM    Glucose (POC) 108 (H) 12/15/2013 07:26 AM     Lab Results   Component Value Date/Time    Bilirubin, total 0.2 09/11/2018 09:21 AM    ALT (SGPT) 12 09/11/2018 09:21 AM    AST (SGOT) 15 09/11/2018 09:21 AM    Alk.  phosphatase 63 09/11/2018 09:21 AM    Protein, total 7.1 09/11/2018 09:21 AM    Albumin 3.8 09/11/2018 09:21 AM    Globulin 3.7 11/11/2014 12:25 PM     Lab Results   Component Value Date/Time    Reticulocyte count 0.7 09/27/2016 04:29 PM    Iron % saturation 50 12/14/2018 04:29 PM    TIBC 250 12/14/2018 04:29 PM    Ferritin 263 (H) 12/14/2018 04:29 PM    Haptoglobin 99 09/27/2016 04:29 PM     09/27/2016 04:29 PM    Sed rate (ESR) 59 (H) 10/20/2011 11:40 AM    TSH 0.837 09/11/2018 09:21 AM    Lipase 153 11/11/2014 12:25 PM     Lab Results   Component Value Date/Time    INR 1.1 12/16/2013 04:00 AM    aPTT 30.3 (H) 12/16/2013 04:00 AM    D-Dimer 0.28 02/15/2017 04:31 PM    Factor VIII Activity 139 07/29/2016 12:17 PM    von Willebrand Factor (vWF) Ag 104 07/29/2016 12:17 PM    vWF Activity 71 07/29/2016 12:17 PM     Ferritin   Date Value   12/14/2018 263 ng/mL (H)   09/19/2018 7 ng/mL (L)   02/21/2018 11 ng/mL (L)   08/09/2017 17 NG/ML   01/25/2017 79 ng/mL   09/27/2016 317 ng/mL (H)   08/22/2016 565 ng/mL (H)   07/29/2016 7 NG/ML (L)       9/27/2016:  Hemoglobin fractionation: normal    1/25/2017:  HGB: 13.4  WBC: 5.7  PLT: 260  MCV: 71  Iron saturation: 31%  Ferritin: 79    1/25/2017:  Smear: Microcytosis offset by a high normal RBC count, suggestive of minor thalassemia or innocent hemoglobinopathy.     US pelvis, US transvaginal 7/13/2016: normal    EGD and colonoscopy on 10/22/18 with Dr. Louann Booker normal.     Assessment:   1) Anemia, iron deficiency  Resolved s/p IV iron 8/2016, though microcytosis has persisted raising some suspicions for alpha thal.  Now anemia is recurring once again. IV Injectafer x 2 doses 9/27/18 and 10/4/18. Repeat labs significantly improved. Reviewed today. GI notes reviewed. Colonoscopy and EGD unremarkable. 2) Menorrhagia  Improved, off OCPs. VWF panel was negative. 3) Anxiety  Following with PCP and will initiate couseling. 4) Acid reflux  Seen by GI. EGD unremarkable. Symptoms improved. 5) Pica  Resolved. Plan:     · Labs in 6 months: CBC, iron profile, ferritin ([facility])  · Call if fatigue or pica return, repeat labs sooner. · Return to see me in 3 months    Patient was seen in conjunction with Letha Marin NP.     Signed By: Luis Rowe MD

## 2018-12-18 NOTE — PROGRESS NOTES
Chief Complaint   Patient presents with    Follow-up     anxiety     Pt in office today for follow up for anxiety. Pt will be returning to work tomorrow and needs a doctors note. Feels like the lexapro is helping. Has also been working a therapist which seems to be helping. Pt feels ready to return to work. Pt has no other concerns. Chief Complaint   Patient presents with    Follow-up     anxiety     she is a 21y.o. year old female who presents for evalution. Reviewed PmHx, RxHx, FmHx, SocHx, AllgHx and updated and dated in the chart. Review of Systems - negative except as listed above in the HPI    Objective:     Vitals:    12/18/18 0828   BP: 102/67   Pulse: 91   Resp: 18   Temp: 98.5 °F (36.9 °C)   TempSrc: Oral   SpO2: 99%   Weight: 114 lb (51.7 kg)   Height: 5' 1\" (1.549 m)     Physical Examination: General appearance - alert, well appearing, and in no distress  Mental status - normal mood, behavior, speech, dress, motor activity, and thought processes    Assessment/ Plan:   Diagnoses and all orders for this visit:    1. MEDHAT (generalized anxiety disorder)  Doing much better. Cleared to return to work without restrictions. Enc to continue lexapro daily and therapy as needed. Follow up if with any new or worsening sx. Follow-up Disposition:  Return if symptoms worsen or fail to improve. I have discussed the diagnosis with the patient and the intended plan as seen in the above orders. The patient has received an after-visit summary and questions were answered concerning future plans. Medication Side Effects and Warnings were discussed with patient: yes  Patient Labs were reviewed and or requested: no  Patient Past Records were reviewed and or requested  yes  Patient / Caregiver Understanding of treatment plan was verbalized during office visit YES    MURIEL Del Real    There are no Patient Instructions on file for this visit.

## 2018-12-18 NOTE — LETTER
NOTIFICATION RETURN TO WORK 
 
12/18/2018 8:38 AM 
 
Ms. Preston Monsivais [address] [place], [place] [address] To Whom It May Concern: 
 
Preston Ferrell is currently under the care of [address]. She will return to work on: Wednesday December 19th, 2018 without restrictions. If there are questions or concerns please have the patient contact our office.  
 
 
 
Sincerely, 
 
 
Sumit Stark NP

## 2019-02-05 ENCOUNTER — OFFICE VISIT (OUTPATIENT)
Dept: FAMILY MEDICINE CLINIC | Age: 24
End: 2019-02-05

## 2019-02-05 VITALS
OXYGEN SATURATION: 100 % | BODY MASS INDEX: 22.28 KG/M2 | DIASTOLIC BLOOD PRESSURE: 75 MMHG | HEIGHT: 61 IN | WEIGHT: 118 LBS | SYSTOLIC BLOOD PRESSURE: 119 MMHG | HEART RATE: 91 BPM | RESPIRATION RATE: 18 BRPM | TEMPERATURE: 98.5 F

## 2019-02-05 DIAGNOSIS — J06.9 ACUTE URI: Primary | ICD-10-CM

## 2019-02-05 DIAGNOSIS — R05.9 COUGH: ICD-10-CM

## 2019-02-05 DIAGNOSIS — J02.9 SORE THROAT: ICD-10-CM

## 2019-02-05 DIAGNOSIS — R52 BODY ACHES: ICD-10-CM

## 2019-02-05 LAB
FLUAV+FLUBV AG NOSE QL IA.RAPID: NEGATIVE POS/NEG
FLUAV+FLUBV AG NOSE QL IA.RAPID: NEGATIVE POS/NEG
S PYO AG THROAT QL: NEGATIVE
VALID INTERNAL CONTROL?: YES
VALID INTERNAL CONTROL?: YES

## 2019-02-05 RX ORDER — CEFDINIR 300 MG/1
300 CAPSULE ORAL 2 TIMES DAILY
Qty: 20 CAP | Refills: 0 | Status: SHIPPED | OUTPATIENT
Start: 2019-02-05 | End: 2019-02-15

## 2019-02-05 NOTE — PROGRESS NOTES
Chief Complaint   Patient presents with    Sore Throat    Cough    Nasal Congestion    Generalized Body Aches     Patient in office today for sx that began Friday. Have treated with Theraflu,Nyquil with no relief,  Cough is nonproductive.

## 2019-02-05 NOTE — PROGRESS NOTES
Chief Complaint   Patient presents with    Sore Throat    Cough    Nasal Congestion    Generalized Body Aches     Patient in office today for sx that began Friday. Have treated with Theraflu,Nyquil with no relief,  Cough is nonproductive. Sx have been present since Thursday night. Denies any fever. Having body aches, headaches, cough, sinus congestion and pressure. Cough is nonproductive. Congestion is stuck in the chest.   Denies any n/v/d. Denies any recent abx. Denies any relief with OTC medication. Denies any other concerns at this time. Chief Complaint   Patient presents with    Sore Throat    Cough    Nasal Congestion    Generalized Body Aches     she is a 21y.o. year old female who presents for evalution. Reviewed PmHx, RxHx, FmHx, SocHx, AllgHx and updated and dated in the chart. Review of Systems - negative except as listed above in the HPI    Objective:     Vitals:    02/05/19 1115   BP: 119/75   Pulse: 91   Resp: 18   Temp: 98.5 °F (36.9 °C)   TempSrc: Oral   SpO2: 100%   Weight: 118 lb (53.5 kg)   Height: 5' 1\" (1.549 m)     Physical Examination: General appearance - alert, well appearing, and in no distress  Eyes - pupils equal and reactive, extraocular eye movements intact  Ears - bilateral TM's and external ear canals normal  Nose - mucosal congestion, mucosal erythema, clear rhinorrhea and sinuses normal and nontender  Mouth - mucous membranes moist, pharynx normal without lesions  Neck - supple, no significant adenopathy  Chest - clear to auscultation, no wheezes, rales or rhonchi, symmetric air entry; productive sounding cough  Heart - normal rate, regular rhythm, normal S1, S2, no murmurs    Assessment/ Plan:   Diagnoses and all orders for this visit:    1. Acute URI  -     cefdinir (OMNICEF) 300 mg capsule; Take 1 Cap by mouth two (2) times a day for 10 days. Start and complete full course of omnicef. Dwp ADRs/SEs of medication. Push fluids.  Rest. Saline nasal spray for nasal congestion. OTC motrin/apap for fevers. RTC if sx persist or worsen. 2. Sore throat / 3. Cough / 4. Body aches  -     AMB POC RJ INFLUENZA A/B TEST  -     AMB POC RAPID STREP A  Neg flu and strep. Follow-up Disposition:  Return if symptoms worsen or fail to improve. I have discussed the diagnosis with the patient and the intended plan as seen in the above orders. The patient has received an after-visit summary and questions were answered concerning future plans. Medication Side Effects and Warnings were discussed with patient: yes  Patient Labs were reviewed and or requested: yes  Patient Past Records were reviewed and or requested  yes  Patient / Caregiver Understanding of treatment plan was verbalized during office visit YES    MURIEL Vaca    Patient Instructions   I have prescribed you the antibiotic Omnicef. Take this medication as directed and complete the entire course, even if you're feeling better. If you miss a dose, take it as soon as possible. Common side effects of this medication include diarrhea, vomiting, and rash. Notify your provider if symptoms do not improve one week after completing the course of this antibiotic.

## 2019-06-17 ENCOUNTER — TELEPHONE (OUTPATIENT)
Dept: ONCOLOGY | Age: 24
End: 2019-06-17

## 2019-06-17 NOTE — TELEPHONE ENCOUNTER
[address] [place] [address]  [phone]    06/17/19- Phone call placed to pt to remind pt to have labs drawn prior to her follow up appointment with . Pt verbalized understanding.

## 2019-06-18 DIAGNOSIS — D50.9 IRON DEFICIENCY ANEMIA, UNSPECIFIED IRON DEFICIENCY ANEMIA TYPE: ICD-10-CM

## 2019-06-20 LAB
BASOPHILS # BLD AUTO: 0.1 X10E3/UL (ref 0–0.2)
BASOPHILS NFR BLD AUTO: 1 %
EOSINOPHIL # BLD AUTO: 0.4 X10E3/UL (ref 0–0.4)
EOSINOPHIL NFR BLD AUTO: 8 %
ERYTHROCYTE [DISTWIDTH] IN BLOOD BY AUTOMATED COUNT: 13.5 % (ref 12.3–15.4)
FERRITIN SERPL-MCNC: 86 NG/ML (ref 15–150)
HCT VFR BLD AUTO: 43.9 % (ref 34–46.6)
HGB BLD-MCNC: 13.7 G/DL (ref 11.1–15.9)
IMM GRANULOCYTES # BLD AUTO: 0 X10E3/UL (ref 0–0.1)
IMM GRANULOCYTES NFR BLD AUTO: 0 %
IRON SATN MFR SERPL: 31 % (ref 15–55)
IRON SERPL-MCNC: 93 UG/DL (ref 27–159)
LYMPHOCYTES # BLD AUTO: 1.7 X10E3/UL (ref 0.7–3.1)
LYMPHOCYTES NFR BLD AUTO: 35 %
MCH RBC QN AUTO: 22.2 PG (ref 26.6–33)
MCHC RBC AUTO-ENTMCNC: 31.2 G/DL (ref 31.5–35.7)
MCV RBC AUTO: 71 FL (ref 79–97)
MONOCYTES # BLD AUTO: 0.5 X10E3/UL (ref 0.1–0.9)
MONOCYTES NFR BLD AUTO: 10 %
NEUTROPHILS # BLD AUTO: 2.2 X10E3/UL (ref 1.4–7)
NEUTROPHILS NFR BLD AUTO: 46 %
PLATELET # BLD AUTO: 254 X10E3/UL (ref 150–450)
RBC # BLD AUTO: 6.18 X10E6/UL (ref 3.77–5.28)
TIBC SERPL-MCNC: 296 UG/DL (ref 250–450)
UIBC SERPL-MCNC: 203 UG/DL (ref 131–425)
WBC # BLD AUTO: 4.8 X10E3/UL (ref 3.4–10.8)

## 2019-06-23 DIAGNOSIS — F41.1 GAD (GENERALIZED ANXIETY DISORDER): ICD-10-CM

## 2019-06-24 RX ORDER — BUSPIRONE HYDROCHLORIDE 10 MG/1
TABLET ORAL
Qty: 60 TAB | Refills: 2 | Status: SHIPPED | OUTPATIENT
Start: 2019-06-24 | End: 2020-06-05 | Stop reason: ALTCHOICE

## 2019-06-24 NOTE — PROGRESS NOTES
[facility] at [address], [address] [place] [address]: [phone]  F: [phone]      Reason for Visit:   Armando Cabrera is a 25 y.o. female who is seen for follow up of anemia. History of Present Illness:   She reports some fatigue. Menses not particularly heavy. Some craving for ice. No dyspnea or dizziness. PAST HISTORY: The following sections were reviewed and updated in the EMR as appropriate: PMH, SH, FH, Medications, Allergies. Allergies   Allergen Reactions    Amoxicillin Rash     Skin peeling      Review of Systems: A complete review of systems was obtained, reviewed, and scanned into the EMR. Pertinent findings reviewed above. Physical Exam:     Visit Vitals  /74 (BP 1 Location: Right arm, BP Patient Position: Sitting)   Pulse 93   Temp 97.6 °F (36.4 °C) (Temporal)   Resp 14   Ht 5' 1\" (1.549 m)   Wt 116 lb (52.6 kg)   SpO2 98%   BMI 21.92 kg/m²     General: No distress  Respiratory: Normal respiratory effort  CV: No peripheral edema  Skin: No rashes, ecchymoses, or petechiae  Psych: Alert, oriented, normal mood/affect      Results:     Lab Results   Component Value Date/Time    WBC 4.8 06/19/2019 04:28 PM    HGB 13.7 06/19/2019 04:28 PM    HCT 43.9 06/19/2019 04:28 PM    PLATELET 608 75/01/3095 04:28 PM    MCV 71 (L) 06/19/2019 04:28 PM    ABS.  NEUTROPHILS 2.2 06/19/2019 04:28 PM     Lab Results   Component Value Date/Time    Sodium 138 09/11/2018 09:21 AM    Potassium 4.5 09/11/2018 09:21 AM    Chloride 101 09/11/2018 09:21 AM    CO2 25 09/11/2018 09:21 AM    Glucose 83 09/11/2018 09:21 AM    BUN 9 09/11/2018 09:21 AM    Creatinine 0.84 09/11/2018 09:21 AM    GFR est  09/11/2018 09:21 AM    GFR est non-AA 98 09/11/2018 09:21 AM    Calcium 9.3 09/11/2018 09:21 AM    Glucose (POC) 108 (H) 12/15/2013 07:26 AM     Lab Results   Component Value Date/Time    Bilirubin, total 0.2 09/11/2018 09:21 AM    ALT (SGPT) 12 09/11/2018 09:21 AM    AST (SGOT) 15 09/11/2018 09:21 AM    Alk. phosphatase 63 09/11/2018 09:21 AM    Protein, total 7.1 09/11/2018 09:21 AM    Albumin 3.8 09/11/2018 09:21 AM    Globulin 3.7 11/11/2014 12:25 PM     Lab Results   Component Value Date/Time    Reticulocyte count 0.7 09/27/2016 04:29 PM    Iron % saturation 31 06/19/2019 04:28 PM    TIBC 296 06/19/2019 04:28 PM    Ferritin 86 06/19/2019 04:28 PM    Haptoglobin 99 09/27/2016 04:29 PM     09/27/2016 04:29 PM    Sed rate (ESR) 59 (H) 10/20/2011 11:40 AM    TSH 0.837 09/11/2018 09:21 AM    Lipase 153 11/11/2014 12:25 PM     Lab Results   Component Value Date/Time    INR 1.1 12/16/2013 04:00 AM    aPTT 30.3 (H) 12/16/2013 04:00 AM    D-Dimer 0.28 02/15/2017 04:31 PM    Factor VIII Activity 139 07/29/2016 12:17 PM    von Willebrand Factor (vWF) Ag 104 07/29/2016 12:17 PM    vWF Activity 71 07/29/2016 12:17 PM     Ferritin   Date Value   06/19/2019 86 ng/mL   12/14/2018 263 ng/mL (H)   09/19/2018 7 ng/mL (L)   02/21/2018 11 ng/mL (L)   08/09/2017 17 NG/ML   01/25/2017 79 ng/mL   09/27/2016 317 ng/mL (H)   08/22/2016 565 ng/mL (H)   07/29/2016 7 NG/ML (L)       9/27/2016:  Hemoglobin fractionation: normal    1/25/2017:  HGB: 13.4  WBC: 5.7  PLT: 260  MCV: 71  Iron saturation: 31%  Ferritin: 79    1/25/2017:  Smear: Microcytosis offset by a high normal RBC count, suggestive of minor thalassemia or innocent hemoglobinopathy.     US pelvis, US transvaginal 7/13/2016: normal    EGD and colonoscopy on 10/22/18 with Dr. Arlyss Ivanoff normal.     Assessment:   1) Anemia, iron deficiency  Resolved s/p IV iron 8/2016 and 10/2018, though microcytosis has persisted raising some suspicions for alpha thal.  Currently monitoring due to risk of recurrence. Labs look good. Continue to monitor. The cause of her iron deficiency is thought to be menorrhagia. GI evaluation was unremarkable. 2) Menorrhagia  Improved, off OCPs. VWF panel was negative.      3) Acid reflux  Seen by GI. EGD unremarkable. Symptoms improved now. 4) Fatigue  Uncertain etiology. Not anemic. Follow up with PCP if this persists. Plan:     · Labs in 12 months: CBC, iron profile, ferritin ([facility])  · Call if fatigue or pica worsen, repeat labs sooner.    · Return to see me in 12 months      Signed By: Bailey Joe MD

## 2019-06-25 ENCOUNTER — OFFICE VISIT (OUTPATIENT)
Dept: ONCOLOGY | Age: 24
End: 2019-06-25

## 2019-06-25 VITALS
SYSTOLIC BLOOD PRESSURE: 128 MMHG | OXYGEN SATURATION: 98 % | BODY MASS INDEX: 21.9 KG/M2 | TEMPERATURE: 97.6 F | RESPIRATION RATE: 14 BRPM | HEIGHT: 61 IN | WEIGHT: 116 LBS | DIASTOLIC BLOOD PRESSURE: 74 MMHG | HEART RATE: 93 BPM

## 2019-06-25 DIAGNOSIS — D50.0 IRON DEFICIENCY ANEMIA DUE TO CHRONIC BLOOD LOSS: Primary | ICD-10-CM

## 2019-06-25 NOTE — PROGRESS NOTES
Brennon Hernandez is a 25 y.o. female follow up for anemia. 1. Have you been to the ER, urgent care clinic since your last visit? Hospitalized since your last visit? No     2. Have you seen or consulted any other health care providers outside of the [address] [place] [address] since your last visit? Include any pap smears or colon screening.  No

## 2019-07-15 ENCOUNTER — OFFICE VISIT (OUTPATIENT)
Dept: FAMILY MEDICINE CLINIC | Age: 24
End: 2019-07-15

## 2019-07-15 VITALS
OXYGEN SATURATION: 100 % | RESPIRATION RATE: 18 BRPM | TEMPERATURE: 98.8 F | SYSTOLIC BLOOD PRESSURE: 114 MMHG | HEART RATE: 101 BPM | HEIGHT: 61 IN | WEIGHT: 120 LBS | BODY MASS INDEX: 22.66 KG/M2 | DIASTOLIC BLOOD PRESSURE: 79 MMHG

## 2019-07-15 DIAGNOSIS — F41.1 GAD (GENERALIZED ANXIETY DISORDER): Primary | ICD-10-CM

## 2019-07-15 RX ORDER — VENLAFAXINE HYDROCHLORIDE 37.5 MG/1
37.5 CAPSULE, EXTENDED RELEASE ORAL DAILY
Qty: 7 CAP | Refills: 0 | Status: SHIPPED | OUTPATIENT
Start: 2019-07-15 | End: 2019-10-04 | Stop reason: DRUGHIGH

## 2019-07-15 RX ORDER — VENLAFAXINE HYDROCHLORIDE 75 MG/1
75 CAPSULE, EXTENDED RELEASE ORAL DAILY
Qty: 30 CAP | Refills: 1 | Status: SHIPPED | OUTPATIENT
Start: 2019-07-15 | End: 2019-10-03 | Stop reason: SDUPTHER

## 2019-07-15 NOTE — PROGRESS NOTES
Chief Complaint   Patient presents with    Anxiety    Medication Evaluation     Patient in office today for med check,  Pt states she has been on lexapro for 2 yrs. Pt states have noted lexapro is not as effective as when therapy was first initiated. Pt states she noted increase in anxiety in the past couple wks. Pt denies new or additional changes in work or home life that would increase stress. Chief Complaint   Patient presents with    Anxiety    Medication Evaluation     she is a 25y.o. year old female who presents for evalution. Reviewed PmHx, RxHx, FmHx, SocHx, AllgHx and updated and dated in the chart. Review of Systems - negative except as listed above in the HPI    Objective:     Vitals:    07/15/19 1339   BP: 114/79   Pulse: (!) 101   Resp: 18   Temp: 98.8 °F (37.1 °C)   TempSrc: Oral   SpO2: 100%   Weight: 120 lb (54.4 kg)   Height: 5' 1\" (1.549 m)     Physical Examination: General appearance - alert, well appearing, and in no distress  Mental status - depressed mood, anxious    Assessment/ Plan:   Diagnoses and all orders for this visit:    1. MEDHAT (generalized anxiety disorder)  -     venlafaxine-SR (EFFEXOR-XR) 37.5 mg capsule; Take 1 Cap by mouth daily. Starting dose days 1-7.  -     venlafaxine-SR (EFFEXOR-XR) 75 mg capsule; Take 1 Cap by mouth daily. Maintenance dose. Stop lexapro and start effexor see pt instructions with taper recommendations. Reviewed SEs/ADRs of medication. Enc pt to follow up if sx persist or worsen or if medication SEs intolerable to discuss alt treatment options. Follow-up and Dispositions    · Return if symptoms worsen or fail to improve. I have discussed the diagnosis with the patient and the intended plan as seen in the above orders. The patient has received an after-visit summary and questions were answered concerning future plans.      Medication Side Effects and Warnings were discussed with patient: yes  Patient Labs were reviewed and or requested: no  Patient Past Records were reviewed and or requested  yes  Patient / Caregiver Understanding of treatment plan was verbalized during office visit MURIEL Ruiz    Patient Instructions   Days 1-7: Take 1/2 a tab of your 20 mg Lexapro with the 37.5 mg of Effexor. On day 8: Stop the Lexapro and start the 75 mg dose of Effexor. That will be your maintenance dose but we can increase it to 150 mg if you feel like its helping but you're not where you want to be. Venlafaxine (By mouth)   Venlafaxine (gey-ju-JHB-een)  Treats depression, generalized anxiety disorder, panic disorder, and social anxiety disorder. Brand Name(s): Effexor XR   There may be other brand names for this medicine. When This Medicine Should Not Be Used: This medicine is not right for everyone. Do not use it if you had an allergic reaction to venlafaxine or desvenlafaxine succinate. How to Use This Medicine:   Long Acting Capsule, Tablet, Long Acting Tablet  · Take your medicine as directed. Your dose may need to be changed several times to find what works best for you. · It is best to take the extended-release capsule at the same time each day (either in the morning or evening). · It is best to take this medicine with food or milk. · Swallow the extended-release capsule whole. Do not crush, break, or chew it. Do not place the capsule in a liquid. · If you cannot swallow the extended-release capsule, you may open it and pour the medicine into a small amount of soft food such as pudding, yogurt, or applesauce. Stir this mixture well and swallow it without chewing. · This medicine should come with a Medication Guide. Ask your pharmacist for a copy if you do not have one. · Missed dose: Take a dose as soon as you remember. If it is almost time for your next dose, wait until then and take a regular dose. Do not take extra medicine to make up for a missed dose.   · Store the medicine in a closed container at room temperature, away from heat, moisture, and direct light. Drugs and Foods to Avoid:   Ask your doctor or pharmacist before using any other medicine, including over-the-counter medicines, vitamins, and herbal products. · Do not use this medicine if you have used an MAO inhibitor within the past 14 days. Do not take an MAO inhibitor for at least 7 days after you stop this medicine. · Some medicines can affect how venlafaxine works. Tell your doctor if you are using any of the following:   ¨ Buspirone, cimetidine, fentanyl, ketoconazole, lithium, metoprolol, mirtazapine, Jeana's wort, tramadol, or tryptophan supplements  ¨ Amphetamines  ¨ Blood thinner (including warfarin)  ¨ Diuretic (water pill)  ¨ Medicine for migraine headaches  ¨ Medicine to lose weight (including phentermine)  ¨ NSAID pain or arthritis medicine (including aspirin, celecoxib, diclofenac, ibuprofen, naproxen)  ¨ Tricyclic antidepressant  · Do not drink alcohol while you are using this medicine. · Tell your doctor if you use anything else that makes you sleepy. Some examples are allergy medicine, narcotic pain medicine, and alcohol. Warnings While Using This Medicine:   · Tell your doctor if you are pregnant or breastfeeding, or if you have kidney disease, liver disease, glaucoma, heart disease, high blood pressure, or thyroid problems. Tell your doctor if you have a history of mariaelena, seizures, heart attack, or stroke. · This medicine can increase thoughts of suicide. Tell your doctor right away if you start to feel depressed and have thoughts about hurting yourself. · This medicine may cause the following problems:   ¨ Serotonin syndrome (when used with certain medicines)  ¨ Increased cholesterol levels  ¨ Increased blood pressure  ¨ Increased risk of bleeding problems  ¨ Low sodium levels  ¨ Interstitial lung disease and eosinophilic pneumonia  · This medicine may make you dizzy or drowsy.  Do not drive or do anything that could be dangerous until you know how this medicine affects you. · Do not stop using this medicine suddenly. Your doctor will need to slowly decrease your dose before you stop it completely. · Tell any doctor or dentist who treats you that you are using this medicine. This medicine may affect certain medical test results. · Your doctor will do lab tests at regular visits to check on the effects of this medicine. Keep all appointments. · Keep all medicine out of the reach of children. Never share your medicine with anyone. Possible Side Effects While Using This Medicine:   Call your doctor right away if you notice any of these side effects:  · Allergic reaction: Itching or hives, swelling in your face or hands, swelling or tingling in your mouth or throat, chest tightness, trouble breathing  · Anxiety, restlessness, fever, sweating, muscle spasms, nausea, vomiting, diarrhea, seeing or hearing things that are not there  · Blistering, peeling, red skin rash  · Chest pain, cough, trouble breathing  · Confusion, weakness, and muscle twitching  · Eye pain, vision changes, seeing halos around lights  · Fast or pounding heartbeat  · Feeling more excited or energetic than usual  · Headache, trouble concentrating, memory problems, unsteadiness  · Seizures  · Unusual behavior, thoughts of hurting yourself or others, trouble sleeping, nervousness, unusual dreams  · Unusual bleeding or bruising  If you notice these less serious side effects, talk with your doctor:   · Dry mouth  · Mild nausea, constipation, vomiting, loss of appetite, weight loss  · Sexual problems  · Sleepiness or unusual drowsiness, dizziness  If you notice other side effects that you think are caused by this medicine, tell your doctor. Call your doctor for medical advice about side effects.  You may report side effects to FDA at 3-775-FDA-5390  © 2017 [facility] Information is for End User's use only and may not be sold, redistributed or otherwise used for commercial purposes. The above information is an  only. It is not intended as medical advice for individual conditions or treatments. Talk to your doctor, nurse or pharmacist before following any medical regimen to see if it is safe and effective for you.

## 2019-07-15 NOTE — PROGRESS NOTES
Chief Complaint   Patient presents with    Anxiety    Medication Evaluation     Patient in office today for med check,  Pt states she has been on lexapro for 2 yrs. Pt states have noted lexapro is not as effective as when therapy was first initiated. Pt states she noted increase in anxiety in the past couple wks. Pt denies new or additional changes in work or home life that would increase stress. 1. Have you been to the ER, urgent care clinic since your last visit? Hospitalized since your last visit? No    2. Have you seen or consulted any other health care providers outside of the [address] [place] [address] since your last visit? Include any pap smears or colon screening.  No

## 2019-07-15 NOTE — PATIENT INSTRUCTIONS
Days 1-7: Take 1/2 a tab of your 20 mg Lexapro with the 37.5 mg of Effexor. On day 8: Stop the Lexapro and start the 75 mg dose of Effexor. That will be your maintenance dose but we can increase it to 150 mg if you feel like its helping but you're not where you want to be. Venlafaxine (By mouth)   Venlafaxine (ynb-fi-DPX-een)  Treats depression, generalized anxiety disorder, panic disorder, and social anxiety disorder. Brand Name(s): Effexor XR   There may be other brand names for this medicine. When This Medicine Should Not Be Used: This medicine is not right for everyone. Do not use it if you had an allergic reaction to venlafaxine or desvenlafaxine succinate. How to Use This Medicine:   Long Acting Capsule, Tablet, Long Acting Tablet  · Take your medicine as directed. Your dose may need to be changed several times to find what works best for you. · It is best to take the extended-release capsule at the same time each day (either in the morning or evening). · It is best to take this medicine with food or milk. · Swallow the extended-release capsule whole. Do not crush, break, or chew it. Do not place the capsule in a liquid. · If you cannot swallow the extended-release capsule, you may open it and pour the medicine into a small amount of soft food such as pudding, yogurt, or applesauce. Stir this mixture well and swallow it without chewing. · This medicine should come with a Medication Guide. Ask your pharmacist for a copy if you do not have one. · Missed dose: Take a dose as soon as you remember. If it is almost time for your next dose, wait until then and take a regular dose. Do not take extra medicine to make up for a missed dose. · Store the medicine in a closed container at room temperature, away from heat, moisture, and direct light.   Drugs and Foods to Avoid:   Ask your doctor or pharmacist before using any other medicine, including over-the-counter medicines, vitamins, and herbal products. · Do not use this medicine if you have used an MAO inhibitor within the past 14 days. Do not take an MAO inhibitor for at least 7 days after you stop this medicine. · Some medicines can affect how venlafaxine works. Tell your doctor if you are using any of the following:   ¨ Buspirone, cimetidine, fentanyl, ketoconazole, lithium, metoprolol, mirtazapine, Jeana's wort, tramadol, or tryptophan supplements  ¨ Amphetamines  ¨ Blood thinner (including warfarin)  ¨ Diuretic (water pill)  ¨ Medicine for migraine headaches  ¨ Medicine to lose weight (including phentermine)  ¨ NSAID pain or arthritis medicine (including aspirin, celecoxib, diclofenac, ibuprofen, naproxen)  ¨ Tricyclic antidepressant  · Do not drink alcohol while you are using this medicine. · Tell your doctor if you use anything else that makes you sleepy. Some examples are allergy medicine, narcotic pain medicine, and alcohol. Warnings While Using This Medicine:   · Tell your doctor if you are pregnant or breastfeeding, or if you have kidney disease, liver disease, glaucoma, heart disease, high blood pressure, or thyroid problems. Tell your doctor if you have a history of mariaelena, seizures, heart attack, or stroke. · This medicine can increase thoughts of suicide. Tell your doctor right away if you start to feel depressed and have thoughts about hurting yourself. · This medicine may cause the following problems:   ¨ Serotonin syndrome (when used with certain medicines)  ¨ Increased cholesterol levels  ¨ Increased blood pressure  ¨ Increased risk of bleeding problems  ¨ Low sodium levels  ¨ Interstitial lung disease and eosinophilic pneumonia  · This medicine may make you dizzy or drowsy. Do not drive or do anything that could be dangerous until you know how this medicine affects you. · Do not stop using this medicine suddenly. Your doctor will need to slowly decrease your dose before you stop it completely.   · Tell any doctor or dentist who treats you that you are using this medicine. This medicine may affect certain medical test results. · Your doctor will do lab tests at regular visits to check on the effects of this medicine. Keep all appointments. · Keep all medicine out of the reach of children. Never share your medicine with anyone. Possible Side Effects While Using This Medicine:   Call your doctor right away if you notice any of these side effects:  · Allergic reaction: Itching or hives, swelling in your face or hands, swelling or tingling in your mouth or throat, chest tightness, trouble breathing  · Anxiety, restlessness, fever, sweating, muscle spasms, nausea, vomiting, diarrhea, seeing or hearing things that are not there  · Blistering, peeling, red skin rash  · Chest pain, cough, trouble breathing  · Confusion, weakness, and muscle twitching  · Eye pain, vision changes, seeing halos around lights  · Fast or pounding heartbeat  · Feeling more excited or energetic than usual  · Headache, trouble concentrating, memory problems, unsteadiness  · Seizures  · Unusual behavior, thoughts of hurting yourself or others, trouble sleeping, nervousness, unusual dreams  · Unusual bleeding or bruising  If you notice these less serious side effects, talk with your doctor:   · Dry mouth  · Mild nausea, constipation, vomiting, loss of appetite, weight loss  · Sexual problems  · Sleepiness or unusual drowsiness, dizziness  If you notice other side effects that you think are caused by this medicine, tell your doctor. Call your doctor for medical advice about side effects. You may report side effects to FDA at 8-380-FDA-2429  © 2017 [facility] Information is for End User's use only and may not be sold, redistributed or otherwise used for commercial purposes. The above information is an  only. It is not intended as medical advice for individual conditions or treatments.  Talk to your doctor, nurse or pharmacist before following any medical regimen to see if it is safe and effective for you.

## 2019-10-08 ENCOUNTER — OFFICE VISIT (OUTPATIENT)
Dept: CARDIOLOGY CLINIC | Age: 24
End: 2019-10-08

## 2019-10-08 VITALS
WEIGHT: 122 LBS | DIASTOLIC BLOOD PRESSURE: 72 MMHG | SYSTOLIC BLOOD PRESSURE: 118 MMHG | HEART RATE: 88 BPM | RESPIRATION RATE: 16 BRPM | HEIGHT: 61 IN | BODY MASS INDEX: 23.03 KG/M2 | OXYGEN SATURATION: 100 %

## 2019-10-08 DIAGNOSIS — I34.0 NON-RHEUMATIC MITRAL REGURGITATION: ICD-10-CM

## 2019-10-08 DIAGNOSIS — Z98.890 H/O MITRAL VALVE REPAIR: ICD-10-CM

## 2019-10-08 DIAGNOSIS — R06.02 SOB (SHORTNESS OF BREATH): ICD-10-CM

## 2019-10-08 DIAGNOSIS — Z98.890 S/P MVR (MITRAL VALVE REPAIR): ICD-10-CM

## 2019-10-08 DIAGNOSIS — F41.1 GAD (GENERALIZED ANXIETY DISORDER): ICD-10-CM

## 2019-10-08 DIAGNOSIS — R07.9 CHEST PAIN, UNSPECIFIED TYPE: Primary | ICD-10-CM

## 2019-10-08 RX ORDER — VENLAFAXINE HYDROCHLORIDE 150 MG/1
CAPSULE, EXTENDED RELEASE ORAL
Qty: 30 CAP | Refills: 2 | Status: SHIPPED | OUTPATIENT
Start: 2019-10-08 | End: 2020-01-27

## 2019-10-08 NOTE — LETTER
10/8/19 Patient: Sherley Fsiher YOB: 1995 Date of Visit: 10/8/2019 Gaviota Boone NP 
[address] [address] VIA In Basket Dear Gaviota Booen NP, Thank you for referring Ms. Raiza Joy to [facility] for evaluation. My notes for this consultation are attached. If you have questions, please do not hesitate to call me. I look forward to following your patient along with you.  
 
 
Sincerely, 
 
Lucy Beltran MD

## 2019-10-08 NOTE — PROGRESS NOTES
LAST OFFICE VISIT : Visit date not found        ICD-10-CM ICD-9-CM   1. Chest pain, unspecified type R07.9 786.50   2. SOB (shortness of breath) R06.02 786.05   3. S/P MVR (mitral valve repair) Z98.890 V45.89   4. Non-rheumatic mitral regurgitation I34.0 424.0   5. H/O mitral valve repair Z98.890 V15.1            Alfredo Arroyo is a 25 y.o. female with non-rheumatic MR s/p MVR referred for follow up. Cardiac risk factors: none  I have personally obtained the history from the patient. HISTORY OF PRESENTING ILLNESS     Overall the pt states she is doing well. Pt denies any irregularity in her heart rhythm. Pt states that she exercises regularly and eats a healthy diet. The patient denies chest pain/ shortness of breath, orthopnea, PND, LE edema, palpitations, syncope, presyncope or fatigue.          ACTIVE PROBLEM LIST     Patient Active Problem List    Diagnosis Date Noted    MEDHAT (generalized anxiety disorder) 12/18/2018    S/P MVR (mitral valve repair) 12/13/2013    Mitral regurgitation 12/02/2013    Anemia, iron deficiency 01/16/2012    Scoliosis 12/07/2010           PAST MEDICAL HISTORY     Past Medical History:   Diagnosis Date    Anemia     Murmur     Valvular heart disease            PAST SURGICAL HISTORY     Past Surgical History:   Procedure Laterality Date    CARDIAC SURG PROCEDURE UNLIST            ALLERGIES     Allergies   Allergen Reactions    Amoxicillin Rash     Skin peeling          FAMILY HISTORY     Family History   Problem Relation Age of Onset    Cancer Father         LYMPHOMA    Heart Disease Father     Other Mother     Arthritis-osteo Mother     Kidney Disease Mother         ANKYLOSING SPONDYLITIS    Diabetes Maternal Grandmother     Hypertension Maternal Grandmother     Kidney Disease Maternal Grandmother     Cancer Maternal Aunt         LUNG CA - SMOKER    Other Maternal Aunt         SARCOIDOSIS    Anesth Problems Neg Hx     negative for cardiac disease       SOCIAL HISTORY     Social History     Socioeconomic History    Marital status: SINGLE     Spouse name: Not on file    Number of children: Not on file    Years of education: Not on file    Highest education level: Not on file   Tobacco Use    Smoking status: Never Smoker    Smokeless tobacco: Never Used   Substance and Sexual Activity    Alcohol use: No     Comment: rarely    Drug use: No    Sexual activity: Never         MEDICATIONS     Current Outpatient Medications   Medication Sig    venlafaxine-SR (EFFEXOR-XR) 150 mg capsule take 1 capsule by mouth daily (MAINTENANCE DOSE)    busPIRone (BUSPAR) 10 mg tablet take 1 tablet by mouth twice a day     No current facility-administered medications for this visit. I have reviewed the nurses notes, vitals, problem list, allergy list, medical history, family, social history and medications. REVIEW OF SYMPTOMS      General: Pt denies excessive weight gain or loss. Pt is able to conduct ADL's  HEENT: Denies blurred vision, headaches, hearing loss, epistaxis and difficulty swallowing. Respiratory: Denies cough, congestion, shortness of breath, WONG, wheezing or stridor. Cardiovascular: Denies precordial pain, palpitations, edema or PND  Gastrointestinal: Denies poor appetite, indigestion, abdominal pain or blood in stool  Genitourinary: Denies hematuria, dysuria, increased urinary frequency  Musculoskeletal: Denies joint pain or swelling from muscles or joints  Neurologic: Denies tremor, paresthesias, headache, or sensory motor disturbance  Psychiatric: Denies confusion, insomnia, depression  Integumentray: Denies rash, itching or ulcers. Hematologic: Denies easy bruising, bleeding     PHYSICAL EXAMINATION      Vitals:    10/08/19 1514   BP: 118/72   Pulse: 88   Resp: 16   SpO2: 100%   Weight: 122 lb (55.3 kg)   Height: 5' 1\" (1.549 m)     General: Well developed, in no acute distress.   HEENT: No jaundice, oral mucosa moist, no oral ulcers  Neck: Supple, no stiffness, no lymphadenopathy, supple  Heart:  Normal S1/S2 negative S3 or S4. Regular, no murmur, gallop or rub, no jugular venous distention  Respiratory: Clear bilaterally x 4, no wheezing or rales  Abdomen:   Soft, non-tender, bowel sounds are active. Extremities:  No edema, normal cap refill, no cyanosis. Musculoskeletal: No clubbing, no deformities  Neuro: A&Ox3, speech clear, gait stable, cooperative, no focal neurologic deficits  Skin: Skin color is normal. No rashes or lesions. Non diaphoretic, moist.  Vascular: 2+ pulses symmetric in all extremities        EKG: SR     DIAGNOSTIC DATA     1. Echocardiograms   (3/27/12) : EF 55-60%  (11/18/13):60-65%, Mitral valve: Flail anterior mitral valve leaflet. There was a marked  prolapse involving the medial segment of the anterior leaflet. There was  moderate to severe regurgitation.  (3/5/14): EF 55-60%  (10/2/19)- EF 65 %, MV annular calcification- mild/mod, MR mild, surgical repair - MV repair 2013    2. Lipids  2/10/17- , HDL 59, , TG 40  4/11/17- , HDL 62, , TG 47  9/11/18- , HDL 69, LDL 81, TG 38    3.  Holter  3/5/14- ST          LABORATORY DATA            Lab Results   Component Value Date/Time    WBC 4.8 06/19/2019 04:28 PM    HGB 13.7 06/19/2019 04:28 PM    HCT 43.9 06/19/2019 04:28 PM    PLATELET 491 74/69/7123 04:28 PM    MCV 71 (L) 06/19/2019 04:28 PM      Lab Results   Component Value Date/Time    Sodium 138 09/11/2018 09:21 AM    Potassium 4.5 09/11/2018 09:21 AM    Chloride 101 09/11/2018 09:21 AM    CO2 25 09/11/2018 09:21 AM    Anion gap 7 11/11/2014 12:25 PM    Glucose 83 09/11/2018 09:21 AM    BUN 9 09/11/2018 09:21 AM    Creatinine 0.84 09/11/2018 09:21 AM    BUN/Creatinine ratio 11 09/11/2018 09:21 AM    GFR est  09/11/2018 09:21 AM    GFR est non-AA 98 09/11/2018 09:21 AM    Calcium 9.3 09/11/2018 09:21 AM    Bilirubin, total 0.2 09/11/2018 09:21 AM    AST (SGOT) 15 09/11/2018 09:21 AM    Alk. phosphatase 63 09/11/2018 09:21 AM    Protein, total 7.1 09/11/2018 09:21 AM    Albumin 3.8 09/11/2018 09:21 AM    Globulin 3.7 11/11/2014 12:25 PM    A-G Ratio 1.2 09/11/2018 09:21 AM    ALT (SGPT) 12 09/11/2018 09:21 AM           ASSESSMENT/RECOMMENDATIONS:.      1. Non-rheumatic mitral regurgitation s/p valve repair 2014  - last echo demonstrated mild MR  - Will consider limited echo upon next visit. 2. Tachycardia   - Her HR is better. I see no need for any negative chronotropic agents. 3. Return in 1 year or PRN. Orders Placed This Encounter    AMB POC EKG ROUTINE W/ 12 LEADS, INTER & REP     Order Specific Question:   Reason for Exam:     Answer:   annual              I have discussed the diagnosis with  Miriam Birch and the intended plan as seen in the above orders. Questions were answered concerning future plans. I have discussed medication side effects and warnings with the patient as well. Thank you,  Ubaldo Ospina NP for involving me in the care of  Miriam Birch. Please do not hesitate to contact me for further questions/concerns. Written by Kaity Kirk, as dictated by Gerson Javed MD.     Rolm Pleasant Cheryle Payor, MD, [facility] [place]    Patient Care Team:  Ubaldo Ospina NP as PCP - General (Nurse Practitioner)  Brook Dorsey LPN as Ambulatory Care Navigator  Latricia Allen MD as Referring Provider (Cardiology)  Brielle Luna MD (Cardiothoracic Surgery)  Ashia Mccurdy MD (Hematology and Oncology)    [place] [id number]      [address] [place] [address], [address] [place] [address]      [phone] / [phone] Fax

## 2019-10-08 NOTE — PROGRESS NOTES
Noe Mejia is a 25 y.o. female    Chief Complaint   Patient presents with    Annual Exam    Mitral Regurgitation    Other     MVR       Visit Vitals  /72 (BP 1 Location: Left arm, BP Patient Position: Sitting)   Pulse 88   Resp 16   Ht 5' 1\" (1.549 m)   Wt 122 lb (55.3 kg)   SpO2 100%   BMI 23.05 kg/m²       1. Have you been to the ER, urgent care clinic since your last visit? Hospitalized since your last visit? No    2. Have you seen or consulted any other health care providers outside of the [address] [place] [address] since your last visit? Include any pap smears or colon screening.  No

## 2020-01-30 ENCOUNTER — OFFICE VISIT (OUTPATIENT)
Dept: FAMILY MEDICINE CLINIC | Age: 25
End: 2020-01-30

## 2020-01-30 ENCOUNTER — HOSPITAL ENCOUNTER (OUTPATIENT)
Dept: LAB | Age: 25
Discharge: HOME | End: 2020-01-30

## 2020-01-30 VITALS
WEIGHT: 124 LBS | DIASTOLIC BLOOD PRESSURE: 73 MMHG | RESPIRATION RATE: 18 BRPM | BODY MASS INDEX: 23.41 KG/M2 | HEIGHT: 61 IN | OXYGEN SATURATION: 100 % | TEMPERATURE: 98.3 F | SYSTOLIC BLOOD PRESSURE: 108 MMHG | HEART RATE: 99 BPM

## 2020-01-30 DIAGNOSIS — R53.83 FATIGUE, UNSPECIFIED TYPE: Primary | ICD-10-CM

## 2020-01-30 DIAGNOSIS — D50.9 IRON DEFICIENCY ANEMIA, UNSPECIFIED IRON DEFICIENCY ANEMIA TYPE: ICD-10-CM

## 2020-01-30 DIAGNOSIS — R53.83 FATIGUE, UNSPECIFIED TYPE: ICD-10-CM

## 2020-01-30 LAB
25(OH)D3 SERPL-MCNC: <9 NG/ML (ref 30–100)
ALBUMIN SERPL-MCNC: 3.7 G/DL (ref 3.5–5)
ALBUMIN/GLOB SERPL: 1 {RATIO} (ref 1.1–2.2)
ALP SERPL-CCNC: 72 U/L (ref 45–117)
ALT SERPL-CCNC: 22 U/L (ref 12–78)
ANION GAP SERPL CALC-SCNC: 6 MMOL/L (ref 5–15)
AST SERPL-CCNC: 12 U/L (ref 15–37)
BASOPHILS # BLD: 0 K/UL (ref 0–0.1)
BASOPHILS NFR BLD: 1 % (ref 0–1)
BILIRUB SERPL-MCNC: 0.3 MG/DL (ref 0.2–1)
BUN SERPL-MCNC: 15 MG/DL (ref 6–20)
BUN/CREAT SERPL: 22 (ref 12–20)
CALCIUM SERPL-MCNC: 8.6 MG/DL (ref 8.5–10.1)
CHLORIDE SERPL-SCNC: 106 MMOL/L (ref 97–108)
CO2 SERPL-SCNC: 25 MMOL/L (ref 21–32)
CREAT SERPL-MCNC: 0.67 MG/DL (ref 0.55–1.02)
DIFFERENTIAL METHOD BLD: ABNORMAL
EOSINOPHIL # BLD: 0.3 K/UL (ref 0–0.4)
EOSINOPHIL NFR BLD: 7 % (ref 0–7)
ERYTHROCYTE [DISTWIDTH] IN BLOOD BY AUTOMATED COUNT: 15.1 % (ref 11.5–14.5)
FERRITIN SERPL-MCNC: 10 NG/ML (ref 26–388)
GLOBULIN SER CALC-MCNC: 3.7 G/DL (ref 2–4)
GLUCOSE SERPL-MCNC: 79 MG/DL (ref 65–100)
HCT VFR BLD AUTO: 41.6 % (ref 35–47)
HGB BLD-MCNC: 12.5 G/DL (ref 11.5–16)
IMM GRANULOCYTES # BLD AUTO: 0 K/UL (ref 0–0.04)
IMM GRANULOCYTES NFR BLD AUTO: 0 % (ref 0–0.5)
IRON SATN MFR SERPL: 8 % (ref 20–50)
IRON SERPL-MCNC: 32 UG/DL (ref 35–150)
LYMPHOCYTES # BLD: 1.9 K/UL (ref 0.8–3.5)
LYMPHOCYTES NFR BLD: 40 % (ref 12–49)
MCH RBC QN AUTO: 22.4 PG (ref 26–34)
MCHC RBC AUTO-ENTMCNC: 30 G/DL (ref 30–36.5)
MCV RBC AUTO: 74.6 FL (ref 80–99)
MONOCYTES # BLD: 0.5 K/UL (ref 0–1)
MONOCYTES NFR BLD: 10 % (ref 5–13)
NEUTS SEG # BLD: 2 K/UL (ref 1.8–8)
NEUTS SEG NFR BLD: 42 % (ref 32–75)
NRBC # BLD: 0 K/UL (ref 0–0.01)
NRBC BLD-RTO: 0 PER 100 WBC
PLATELET # BLD AUTO: 245 K/UL (ref 150–400)
PMV BLD AUTO: 11.7 FL (ref 8.9–12.9)
POTASSIUM SERPL-SCNC: 4 MMOL/L (ref 3.5–5.1)
PROT SERPL-MCNC: 7.4 G/DL (ref 6.4–8.2)
RBC # BLD AUTO: 5.58 M/UL (ref 3.8–5.2)
SODIUM SERPL-SCNC: 137 MMOL/L (ref 136–145)
TIBC SERPL-MCNC: 402 UG/DL (ref 250–450)
TSH SERPL DL<=0.05 MIU/L-ACNC: 0.68 UIU/ML (ref 0.36–3.74)
WBC # BLD AUTO: 4.7 K/UL (ref 3.6–11)

## 2020-01-30 NOTE — PROGRESS NOTES
Chief Complaint   Patient presents with    Fatigue    Labs     Patient in office today for decrease in energy levels. Pt states fatigue started one month ago. Would like to have labs checked for iron deficiency. Pt is not taking iron supplements due to unable to tolerate, would note vomiting. Pt has an appt with the hematologist in the next few weeks. Discussed that if we do lab work and it shows anemia, they will set her up for an infusion. Since last OV pt has started a new job. Much happier there. Sleeping well at night. Starts to notice she feels like she is dragging immediately when she wakes up. On days off can lay in bed all day and sleep. Denies any other concerns at this time. Chief Complaint   Patient presents with    Fatigue    Labs     she is a 25y.o. year old female who presents for evalution. Reviewed PmHx, RxHx, FmHx, SocHx, AllgHx and updated and dated in the chart.     Review of Systems - negative except as listed above in the HPI    Objective:     Vitals:    01/30/20 0800   BP: 108/73   Pulse: 99   Resp: 18   Temp: 98.3 °F (36.8 °C)   TempSrc: Oral   SpO2: 100%   Weight: 124 lb (56.2 kg)   Height: 5' 1\" (1.549 m)     Physical Examination: General appearance - alert, well appearing, and in no distress  Mental status - normal mood, behavior  Eyes - pupils equal and reactive, extraocular eye movements intact  Ears - bilateral TM's and external ear canals normal  Nose - normal and patent, no erythema, discharge or polyps and normal nontender sinuses  Mouth - mucous membranes moist, pharynx normal without lesions  Neck - supple, no significant adenopathy  Chest - clear to auscultation, no wheezes, rales or rhonchi, symmetric air entry  Heart - normal rate, regular rhythm, normal S1, S2, no murmurs  Extremities - peripheral pulses normal, no edema, no clubbing or cyanosis  Skin - normal coloration and turgor, no rashes, no suspicious skin lesions noted    Assessment/ Plan: Diagnoses and all orders for this visit:    1. Fatigue, unspecified type  -     VITAMIN D, 25 HYDROXY; Future  -     METABOLIC PANEL, COMPREHENSIVE; Future  -     TSH 3RD GENERATION; Future  Will notify results and deviate plan based on findings. 2. Iron deficiency anemia, unspecified iron deficiency anemia type  -     CBC WITH AUTOMATED DIFF; Future  -     IRON PROFILE; Future  -     FERRITIN; Future  Will notify results and deviate plan based on findings. I have discussed the diagnosis with the patient and the intended plan as seen in the above orders. The patient has received an after-visit summary and questions were answered concerning future plans. Medication Side Effects and Warnings were discussed with patient: yes  Patient Labs were reviewed and or requested: yes  Patient Past Records were reviewed and or requested  yes  Patient / Caregiver Understanding of treatment plan was verbalized during office visit YES    MURIEL Linder    There are no Patient Instructions on file for this visit.

## 2020-01-30 NOTE — PROGRESS NOTES
Chief Complaint   Patient presents with    Fatigue    Labs     Patient in office today for decrease in energy levels. Pt states fatigue started one month ago. Would like to have labs checked for iron deficiency. Pt is not taking iron supplements due to unable to tolerate,would not vomiting. 1. Have you been to the ER, urgent care clinic since your last visit? Hospitalized since your last visit? No    2. Have you seen or consulted any other health care providers outside of the [address] [place] [address] since your last visit? Include any pap smears or colon screening.  No

## 2020-01-31 RX ORDER — ERGOCALCIFEROL 1.25 MG/1
50000 CAPSULE ORAL
Qty: 12 CAP | Refills: 0 | Status: SHIPPED | OUTPATIENT
Start: 2020-01-31 | End: 2021-08-17

## 2020-01-31 NOTE — PROGRESS NOTES
The following message was sent to pt via [employer] portal in reference to lab results:    Good morning Cee,     Attached are the results of your most recent lab work. I have the following recommendations. 1. Your CBC which looks at your white blood cells, red blood cells, and hemoglobin came back suggesting that you have iron deficiency anemia. Therefore I recommend you schedule an appointment with the hematologist to discuss receiving another iron infusion. 2. Your metabolic panel which looks at your blood glucose, liver function, and kidney function looks perfect. 3. Your cholesterol came back looking great so keep up the good work with diet and exercise. 4. Your TSH which screens for thyroid disease came back normal. This means you do not have hyper or hypothyroidism. 5. Your vitamin D level is very low or deficient. I would like you to take a once weekly vitamin D supplement over the next 3 months to replete this. Once you have finished that, start taking an over the counter calcium and vitamin D supplement that contains 2,000 IUs of vitamin D daily to prevent your levels from dropping again. Having normal vitamin D levels is important because you need vitamin D to absorb calcium into the bone and having low vitamin D levels increases your risk for osteoporosis down the road. Please let me know if you have any questions or concerns regarding these results.    MURIEL Cano

## 2020-02-04 ENCOUNTER — TELEPHONE (OUTPATIENT)
Dept: ONCOLOGY | Age: 25
End: 2020-02-04

## 2020-02-04 NOTE — TELEPHONE ENCOUNTER
[address] at [place]  [phone]    02/04/20- Voicemail left for patient requesting a return call when available.

## 2020-02-04 NOTE — TELEPHONE ENCOUNTER
Patient is schedule for 1 year follow up. Patient had recent lab work done at her PCP office. PCP has instructed patient to get her year follow up appt ( June 2020) moved up due to she feel the patient needs an infusion. Patient was calling to get this done.     Please advises:      [place] # [id number]

## 2020-02-04 NOTE — TELEPHONE ENCOUNTER
[place] [address]  [phone]    02/04/20- Patient returned phone call- informed her the following below per . Labs reviewed. Iron deficiency recurring, though not anemic at this time. If she is feeling well, we can hold off on IV iron for now and repeat labs before her visit this summer. If she is having worsening symptoms that she attributes to iron deficiency, then we can move up her appt and schedule her for one dose of injectafer. She would like to proceed with seeing  sooner and having injectafer scheduled as she is feeling more weak/tired. Advised her someone will her call her soon to schedule at her convenience. She verbalized understanding and denied any further questions or concerns.

## 2020-02-04 NOTE — TELEPHONE ENCOUNTER
[address] at [place]  [phone]    Labs reviewed. Iron deficiency recurring, though not anemic at this time. If she is feeling well, we can hold off on IV iron for now and repeat labs before her visit this summer. If she is having worsening symptoms that she attributes to iron deficiency, then we can move up her appt and schedule her for one dose of injectafer.

## 2020-03-05 RX ORDER — ACETAMINOPHEN 325 MG/1
650 TABLET ORAL AS NEEDED
Status: CANCELLED
Start: 2020-03-10

## 2020-03-05 RX ORDER — SODIUM CHLORIDE 0.9 % (FLUSH) 0.9 %
10 SYRINGE (ML) INJECTION AS NEEDED
Status: CANCELLED
Start: 2020-03-10

## 2020-03-05 RX ORDER — HYDROCORTISONE SODIUM SUCCINATE 100 MG/2ML
100 INJECTION, POWDER, FOR SOLUTION INTRAMUSCULAR; INTRAVENOUS AS NEEDED
Status: CANCELLED | OUTPATIENT
Start: 2020-03-10

## 2020-03-05 RX ORDER — DIPHENHYDRAMINE HYDROCHLORIDE 50 MG/ML
50 INJECTION, SOLUTION INTRAMUSCULAR; INTRAVENOUS AS NEEDED
Status: CANCELLED
Start: 2020-03-10

## 2020-03-05 RX ORDER — ALBUTEROL SULFATE 0.83 MG/ML
2.5 SOLUTION RESPIRATORY (INHALATION) AS NEEDED
Status: CANCELLED
Start: 2020-03-10

## 2020-03-05 RX ORDER — DIPHENHYDRAMINE HYDROCHLORIDE 50 MG/ML
25 INJECTION, SOLUTION INTRAMUSCULAR; INTRAVENOUS AS NEEDED
Status: CANCELLED
Start: 2020-03-10

## 2020-03-05 RX ORDER — ONDANSETRON 2 MG/ML
8 INJECTION INTRAMUSCULAR; INTRAVENOUS AS NEEDED
Status: CANCELLED | OUTPATIENT
Start: 2020-03-10

## 2020-03-05 RX ORDER — SODIUM CHLORIDE 9 MG/ML
10 INJECTION INTRAMUSCULAR; INTRAVENOUS; SUBCUTANEOUS AS NEEDED
Status: CANCELLED | OUTPATIENT
Start: 2020-03-10

## 2020-03-05 RX ORDER — EPINEPHRINE 1 MG/ML
0.3 INJECTION, SOLUTION, CONCENTRATE INTRAVENOUS AS NEEDED
Status: CANCELLED | OUTPATIENT
Start: 2020-03-10

## 2020-03-05 RX ORDER — HEPARIN 100 UNIT/ML
300-500 SYRINGE INTRAVENOUS AS NEEDED
Status: CANCELLED
Start: 2020-03-10

## 2020-03-06 NOTE — PROGRESS NOTES
[facility] at [address], [address] [facility] [address] [facility] [place]: [phone]  F: [phone]      Reason for Visit:   Armando Cabrera is a 25 y.o. female who is seen for follow up of anemia. History of Present Illness:   She has been feeling more weak and tired lately. Pica has returned. Increased bruising. All of this had improved previously. She called and we move up her labs, which demonstrate recurrent iron deficiency. Menses still not particularly heavy by her report. No other bleeding. No dizziness or dyspnea. PAST HISTORY: The following sections were reviewed and updated in the EMR as appropriate: PMH, SH, FH, Medications, Allergies. Allergies   Allergen Reactions    Amoxicillin Rash     Skin peeling      Review of Systems: A complete review of systems was obtained, reviewed, and scanned into the EMR. Pertinent findings reviewed above. Physical Exam:     Visit Vitals  /74 (BP 1 Location: Left arm, BP Patient Position: Sitting)   Pulse (!) 107   Temp 97.3 °F (36.3 °C) (Temporal)   Resp 14   Ht 5' 1\" (1.549 m)   Wt 130 lb (59 kg)   SpO2 100%   BMI 24.56 kg/m²     General: No distress  Respiratory: Normal respiratory effort  CV: No peripheral edema  Skin: No rashes, ecchymoses, or petechiae  Psych: Alert, oriented, normal mood/affect      Results:     Lab Results   Component Value Date/Time    WBC 4.7 01/30/2020 08:15 AM    HGB 12.5 01/30/2020 08:15 AM    HCT 41.6 01/30/2020 08:15 AM    PLATELET 384 86/94/7736 08:15 AM    MCV 74.6 (L) 01/30/2020 08:15 AM    ABS.  NEUTROPHILS 2.0 01/30/2020 08:15 AM     Lab Results   Component Value Date/Time    Sodium 137 01/30/2020 08:15 AM    Potassium 4.0 01/30/2020 08:15 AM    Chloride 106 01/30/2020 08:15 AM    CO2 25 01/30/2020 08:15 AM    Glucose 79 01/30/2020 08:15 AM    BUN 15 01/30/2020 08:15 AM    Creatinine 0.67 01/30/2020 08:15 AM    GFR est AA >60 01/30/2020 08:15 AM    GFR est non-AA >60 01/30/2020 08:15 AM    Calcium 8.6 01/30/2020 08:15 AM    Glucose (POC) 108 (H) 12/15/2013 07:26 AM     Lab Results   Component Value Date/Time    Bilirubin, total 0.3 01/30/2020 08:15 AM    ALT (SGPT) 22 01/30/2020 08:15 AM    AST (SGOT) 12 (L) 01/30/2020 08:15 AM    Alk. phosphatase 72 01/30/2020 08:15 AM    Protein, total 7.4 01/30/2020 08:15 AM    Albumin 3.7 01/30/2020 08:15 AM    Globulin 3.7 01/30/2020 08:15 AM     Lab Results   Component Value Date/Time    Reticulocyte count 0.7 09/27/2016 04:29 PM    Iron % saturation 8 (L) 01/30/2020 08:15 AM    TIBC 402 01/30/2020 08:15 AM    Ferritin 10 (L) 01/30/2020 08:15 AM    Haptoglobin 99 09/27/2016 04:29 PM     09/27/2016 04:29 PM    Sed rate (ESR) 59 (H) 10/20/2011 11:40 AM    TSH 0.68 01/30/2020 08:15 AM    Lipase 153 11/11/2014 12:25 PM     Lab Results   Component Value Date/Time    INR 1.1 12/16/2013 04:00 AM    aPTT 30.3 (H) 12/16/2013 04:00 AM    D-Dimer 0.28 02/15/2017 04:31 PM    Factor VIII Activity 139 07/29/2016 12:17 PM    von Willebrand Factor (vWF) Ag 104 07/29/2016 12:17 PM    vWF Activity 71 07/29/2016 12:17 PM     Ferritin   Date Value   01/30/2020 10 NG/ML (L)   06/19/2019 86 ng/mL   12/14/2018 263 ng/mL (H)   09/19/2018 7 ng/mL (L)   02/21/2018 11 ng/mL (L)   08/09/2017 17 NG/ML   01/25/2017 79 ng/mL   09/27/2016 317 ng/mL (H)   08/22/2016 565 ng/mL (H)   07/29/2016 7 NG/ML (L)       9/27/2016:  Hemoglobin fractionation: normal    1/25/2017:  HGB: 13.4  WBC: 5.7  PLT: 260  MCV: 71  Iron saturation: 31%  Ferritin: 79    1/25/2017:  Smear: Microcytosis offset by a high normal RBC count, suggestive of minor thalassemia or innocent hemoglobinopathy.     US pelvis, US transvaginal 7/13/2016: normal    EGD and colonoscopy on 10/22/18 with Dr. Wilber Hilario normal.     Assessment:   1) Anemia, iron deficiency  Resolved s/p IV iron 8/2016 and 10/2018, though microcytosis has persisted raising some suspicions for alpha thal.  Currently monitoring due to risk of recurrence. Her HGB remains normal, but her iron levels are worsening and she has developed progressive weakness/fatigue. She has requested that we proceed with additional IV iron. We will give one dose of injectafer. I have reviewed with her the risk of allergic reaction with IV iron and she would like to proceed. The cause of her iron deficiency is thought to be menorrhagia. GI evaluation was unremarkable. 2) Menorrhagia  Improved, off OCPs. VWF panel was negative. 3) Acid reflux  Seen by GI. EGD unremarkable. Symptoms improved now. 4) Fatigue  Uncertain etiology. Not anemic. Follow up with PCP if this persists.       Plan:     · Injectafer 750mg IV today  · Labs in 3 months: CBC, iron profile, ferritin (she prefers [facility])  · Return to see me in 3 months      Signed By: Misbah Moreira MD

## 2020-03-10 ENCOUNTER — HOSPITAL ENCOUNTER (OUTPATIENT)
Dept: INFUSION THERAPY | Age: 25
Discharge: HOME | End: 2020-03-10
Payer: COMMERCIAL

## 2020-03-10 ENCOUNTER — OFFICE VISIT (OUTPATIENT)
Dept: ONCOLOGY | Age: 25
End: 2020-03-10

## 2020-03-10 VITALS
DIASTOLIC BLOOD PRESSURE: 74 MMHG | BODY MASS INDEX: 24.55 KG/M2 | HEART RATE: 107 BPM | OXYGEN SATURATION: 100 % | TEMPERATURE: 97.3 F | HEIGHT: 61 IN | SYSTOLIC BLOOD PRESSURE: 123 MMHG | RESPIRATION RATE: 14 BRPM | WEIGHT: 130 LBS

## 2020-03-10 VITALS
RESPIRATION RATE: 18 BRPM | SYSTOLIC BLOOD PRESSURE: 107 MMHG | DIASTOLIC BLOOD PRESSURE: 61 MMHG | HEART RATE: 105 BPM | WEIGHT: 130.9 LBS | TEMPERATURE: 98.2 F | OXYGEN SATURATION: 100 % | BODY MASS INDEX: 24.73 KG/M2

## 2020-03-10 DIAGNOSIS — D50.0 IRON DEFICIENCY ANEMIA DUE TO CHRONIC BLOOD LOSS: Primary | ICD-10-CM

## 2020-03-10 DIAGNOSIS — D50.9 IRON DEFICIENCY ANEMIA, UNSPECIFIED IRON DEFICIENCY ANEMIA TYPE: Primary | ICD-10-CM

## 2020-03-10 PROCEDURE — 96365 THER/PROPH/DIAG IV INF INIT: CPT

## 2020-03-10 PROCEDURE — 74011250636 HC RX REV CODE- 250/636: Performed by: NURSE PRACTITIONER

## 2020-03-10 RX ORDER — SODIUM CHLORIDE 0.9 % (FLUSH) 0.9 %
10 SYRINGE (ML) INJECTION AS NEEDED
Status: DISCONTINUED | OUTPATIENT
Start: 2020-03-10 | End: 2020-03-11 | Stop reason: HOSPADM

## 2020-03-10 RX ORDER — HEPARIN 100 UNIT/ML
300-500 SYRINGE INTRAVENOUS AS NEEDED
Status: DISCONTINUED | OUTPATIENT
Start: 2020-03-10 | End: 2020-03-11 | Stop reason: HOSPADM

## 2020-03-10 RX ORDER — SODIUM CHLORIDE 9 MG/ML
10 INJECTION INTRAMUSCULAR; INTRAVENOUS; SUBCUTANEOUS AS NEEDED
Status: DISCONTINUED | OUTPATIENT
Start: 2020-03-10 | End: 2020-03-11 | Stop reason: HOSPADM

## 2020-03-10 RX ADMIN — FERRIC CARBOXYMALTOSE INJECTION 750 MG: 50 INJECTION, SOLUTION INTRAVENOUS at 14:11

## 2020-03-10 NOTE — PROGRESS NOTES
Tre Santacruz is a 25 y.o. female follow up for anemia. 1. Have you been to the ER, urgent care clinic since your last visit? Hospitalized since your last visit?no     2. Have you seen or consulted any other health care providers outside of the [address] [place] [address] since your last visit? Include any pap smears or colon screening.  no

## 2020-03-10 NOTE — PROGRESS NOTES
[facility] Progress Note    Date: March 10, 2020    Name: Beny Frye    MRN: [id number]         : 1995    Ms. Alexandru Cunha Arrived ambulatory and in no distress for Injectafer Infusion. Assessment was completed, no acute issues at this time, no new complaints voiced. 24 G PIV established to Right arm, + blood return. Pt proceeded to appointment with Dr. Cam Kemp. Ms. Maher's vitals were reviewed. Visit Vitals  /61 (BP 1 Location: Left arm, BP Patient Position: At rest)   Pulse (!) 105   Temp 98.2 °F (36.8 °C)   Resp 18   SpO2 100%   Breastfeeding No       Medications:  Medications Administered     ferric carboxymaltose (INJECTAFER) 750 mg in 0.9% sodium chloride 250 mL, overfill volume 25 mL IVPB     Admin Date  03/10/2020 Action  Given Dose  750 mg Rate  870 mL/hr Route  IntraVENous Administered By  Kelley Cushing, SHERMAN                Ms. Alexandru Cunha tolerated treatment well and was discharged from [place] [address] in stable condition. PIV flushed & removed. She is to follow up with her provider regarding further treatments.      Emery Silvestre RN  March 10, 2020

## 2020-05-14 ENCOUNTER — TELEPHONE (OUTPATIENT)
Dept: ONCOLOGY | Age: 25
End: 2020-05-14

## 2020-05-31 LAB
BASOPHILS # BLD AUTO: 0 X10E3/UL (ref 0–0.2)
BASOPHILS NFR BLD AUTO: 1 %
EOSINOPHIL # BLD AUTO: 0.4 X10E3/UL (ref 0–0.4)
EOSINOPHIL NFR BLD AUTO: 9 %
ERYTHROCYTE [DISTWIDTH] IN BLOOD BY AUTOMATED COUNT: 14.4 % (ref 11.7–15.4)
FERRITIN SERPL-MCNC: 106 NG/ML (ref 15–150)
HCT VFR BLD AUTO: 41.3 % (ref 34–46.6)
HGB BLD-MCNC: 12.5 G/DL (ref 11.1–15.9)
IMM GRANULOCYTES # BLD AUTO: 0 X10E3/UL (ref 0–0.1)
IMM GRANULOCYTES NFR BLD AUTO: 0 %
IRON SATN MFR SERPL: 23 % (ref 15–55)
IRON SERPL-MCNC: 61 UG/DL (ref 27–159)
LYMPHOCYTES # BLD AUTO: 1.6 X10E3/UL (ref 0.7–3.1)
LYMPHOCYTES NFR BLD AUTO: 39 %
MCH RBC QN AUTO: 22.3 PG (ref 26.6–33)
MCHC RBC AUTO-ENTMCNC: 30.3 G/DL (ref 31.5–35.7)
MCV RBC AUTO: 74 FL (ref 79–97)
MONOCYTES # BLD AUTO: 0.3 X10E3/UL (ref 0.1–0.9)
MONOCYTES NFR BLD AUTO: 7 %
NEUTROPHILS # BLD AUTO: 1.9 X10E3/UL (ref 1.4–7)
NEUTROPHILS NFR BLD AUTO: 44 %
PLATELET # BLD AUTO: 219 X10E3/UL (ref 150–450)
RBC # BLD AUTO: 5.61 X10E6/UL (ref 3.77–5.28)
TIBC SERPL-MCNC: 271 UG/DL (ref 250–450)
UIBC SERPL-MCNC: 210 UG/DL (ref 131–425)
WBC # BLD AUTO: 4.2 X10E3/UL (ref 3.4–10.8)

## 2020-06-01 NOTE — PROGRESS NOTES
[facility] at [facility]  [address] [facility] [facility] [phone]  F: [phone]      Reason for Visit:   Celina Dykes is a 25 y.o. female who is seen by synchronous (real-time) audio-video technology for follow up of anemia. History of Present Illness:   She received injectafer 3/2020. She reports energy improved with this, though it took a few weeks. Energy has remained good. Pica resolved. Bruising improved. Menses still not particularly heavy by her report. No other bleeding. No dizziness or dyspnea. PAST HISTORY: The following sections were reviewed and updated in the EMR as appropriate: PMH, SH, FH, Medications, Allergies. Allergies   Allergen Reactions    Amoxicillin Rash     Skin peeling      Review of Systems: A complete review of systems was obtained, reviewed, and scanned into the EMR. Pertinent findings reviewed above. Physical Exam:     There were no vitals taken for this visit. General: alert, cooperative, no distress   Mental  status: normal mood, behavior, speech, dress, motor activity, and thought processes, able to follow commands   HENT: NCAT   Neck: no visualized mass   Resp: no respiratory distress   Neuro: no gross deficits   Skin: no discoloration or lesions of concern on visible areas   Psychiatric: normal affect, consistent with stated mood, no evidence of hallucinations       Due to this being a TeleHealth evaluation (During RFIJR-89 public health emergency), many elements of the physical examination are unable to be assessed. Evaluation of the following organ systems was limited: Vitals/Constitutional/EENT/Resp/CV/GI//MS/Neuro/Skin/Heme-Lymph-Imm. Results:     Lab Results   Component Value Date/Time    WBC 4.2 05/30/2020 10:08 AM    HGB 12.5 05/30/2020 10:08 AM    HCT 41.3 05/30/2020 10:08 AM    PLATELET 526 97/64/0555 10:08 AM    MCV 74 (L) 05/30/2020 10:08 AM    ABS.  NEUTROPHILS 1.9 05/30/2020 10:08 AM     Lab Results   Component Value Date/Time    Sodium 137 01/30/2020 08:15 AM    Potassium 4.0 01/30/2020 08:15 AM    Chloride 106 01/30/2020 08:15 AM    CO2 25 01/30/2020 08:15 AM    Glucose 79 01/30/2020 08:15 AM    BUN 15 01/30/2020 08:15 AM    Creatinine 0.67 01/30/2020 08:15 AM    GFR est AA >60 01/30/2020 08:15 AM    GFR est non-AA >60 01/30/2020 08:15 AM    Calcium 8.6 01/30/2020 08:15 AM    Glucose (POC) 108 (H) 12/15/2013 07:26 AM     Lab Results   Component Value Date/Time    Bilirubin, total 0.3 01/30/2020 08:15 AM    ALT (SGPT) 22 01/30/2020 08:15 AM    Alk.  phosphatase 72 01/30/2020 08:15 AM    Protein, total 7.4 01/30/2020 08:15 AM    Albumin 3.7 01/30/2020 08:15 AM    Globulin 3.7 01/30/2020 08:15 AM     Lab Results   Component Value Date/Time    Reticulocyte count 0.7 09/27/2016 04:29 PM    Iron % saturation 23 05/30/2020 10:08 AM    TIBC 271 05/30/2020 10:08 AM    Ferritin 106 05/30/2020 10:08 AM    Haptoglobin 99 09/27/2016 04:29 PM     09/27/2016 04:29 PM    Sed rate (ESR) 59 (H) 10/20/2011 11:40 AM    TSH 0.68 01/30/2020 08:15 AM    Lipase 153 11/11/2014 12:25 PM     Lab Results   Component Value Date/Time    INR 1.1 12/16/2013 04:00 AM    aPTT 30.3 (H) 12/16/2013 04:00 AM    D-Dimer 0.28 02/15/2017 04:31 PM    Factor VIII Activity 139 07/29/2016 12:17 PM    von Willebrand Factor (vWF) Ag 104 07/29/2016 12:17 PM    vWF Activity 71 07/29/2016 12:17 PM     Ferritin   Date Value   05/30/2020 106 ng/mL   01/30/2020 10 NG/ML (L)   06/19/2019 86 ng/mL   12/14/2018 263 ng/mL (H)   09/19/2018 7 ng/mL (L)   02/21/2018 11 ng/mL (L)   08/09/2017 17 NG/ML   01/25/2017 79 ng/mL   09/27/2016 317 ng/mL (H)   08/22/2016 565 ng/mL (H)   07/29/2016 7 NG/ML (L)       9/27/2016:  Hemoglobin fractionation: normal    1/25/2017:  HGB: 13.4  WBC: 5.7  PLT: 260  MCV: 71  Iron saturation: 31%  Ferritin: 79    1/25/2017:  Smear: Microcytosis offset by a high normal RBC count, suggestive of minor thalassemia or innocent hemoglobinopathy.     US pelvis, US transvaginal 7/13/2016: normal    EGD and colonoscopy on 10/22/18 with Dr. Kishan Menezes normal.     Assessment:   1) Anemia, iron deficiency  Recurrent problems. Has required IV iron in 8/2016, 10/2018, and 3/2020. Anemia resolves with each treatment, though microcytosis has persisted raising some suspicions for alpha thal.  She has been unable to take oral iron. HGB better now. Currently monitoring due to risk of recurrence. The cause of her iron deficiency is thought to be menorrhagia. GI evaluation was unremarkable. 2) Menorrhagia  Improved, off OCPs. VWF panel was negative. 3) Acid reflux  Seen by GI. EGD unremarkable. Symptoms improved now. 4) Fatigue  Improved. Plan:     · Labs in 12 months: CBC, iron profile, ferritin (she prefers [facility])  · Return to see me in 12 months    Dolly Bang for virtual visit    Signed By: Maribel Velázquez MD      I was in the office while conducting this encounter. The patient was at her home. Consent:  She and/or her healthcare decision maker is aware that this patient-initiated Telehealth encounter is a billable service, with coverage as determined by her insurance carrier. She is aware that she may receive a bill and has provided verbal consent to proceed: Yes    Pursuant to the emergency declaration under the [employer] and the [facility], 1135 waiver authority and the [employer] and [employer] Act, this Virtual  Visit was conducted, with patient's (and/or legal guardian's) consent, to reduce the patient's risk of exposure to COVID-19 and provide necessary medical care. Services were provided through a video synchronous discussion virtually to substitute for in-person visit.

## 2020-06-05 ENCOUNTER — VIRTUAL VISIT (OUTPATIENT)
Dept: ONCOLOGY | Age: 25
End: 2020-06-05

## 2020-06-05 DIAGNOSIS — D50.9 IRON DEFICIENCY ANEMIA, UNSPECIFIED IRON DEFICIENCY ANEMIA TYPE: Primary | ICD-10-CM

## 2020-06-05 NOTE — PATIENT INSTRUCTIONS
Thank you for participating in the virtual visit with Dr. Thuan Parisi. Someone will call you to schedule a follow up appointment with us in 12 months. If you do not hear from us, please call us at [phone] to schedule your appointment. Please use the enclosed lab slip to have your labs done before your next appointment. Iron-Rich Diet: Care Instructions Your Care Instructions Your body needs iron to make hemoglobin. Hemoglobin is a substance in red blood cells that carries oxygen from the lungs to cells all through your body. If you do not get enough iron, your body makes fewer and smaller red blood cells. As a result, your body's cells may not get enough oxygen. Adult men need 8 milligrams of iron a day; adult women need 18 milligrams of iron a day. After menopause, women need 8 milligrams of iron a day. A pregnant woman needs 27 milligrams of iron a day. Infants and young children have higher iron needs relative to their size than other age groups. People who have lost blood because of ulcers or heavy menstrual periods may become very low in iron and may develop anemia. Most people can get the iron their bodies need by eating enough of certain iron-rich foods. Your doctor may recommend that you take an iron supplement along with eating an iron-rich diet. Follow-up care is a key part of your treatment and safety. Be sure to make and go to all appointments, and call your doctor if you are having problems. It's also a good idea to know your test results and keep a list of the medicines you take. How can you care for yourself at home? · Make iron-rich foods a part of your daily diet. Iron-rich foods include: ? All meats, such as chicken, beef, lamb, pork, fish, and shellfish. Liver is especially high in iron. ? Leafy green vegetables. ? Raisins, peas, beans, lentils, barley, and eggs. ? Iron-fortified breakfast cereals. · Eat foods with vitamin C along with iron-rich foods. Vitamin C helps you absorb more iron from food. Drink a glass of orange juice or another citrus juice with your food. · Eat meat and vegetables or grains together. The iron in meat helps your body absorb the iron in other foods. Where can you learn more? Go to http://carmen-maximus.info/ Enter 0328 [id number] in the search box to learn more about \"Iron-Rich Diet: Care Instructions. \" Current as of: August 22, 2019               Content Version: 12.5 © 6817-3515 [employer]wise[employer]orporated. Care instructions adapted under license by [employer] (which disclaims liability or warranty for this information). If you have questions about a medical condition or this instruction, always ask your healthcare professional. [address] any warranty or liability for your use of this information.

## 2020-10-13 ENCOUNTER — OFFICE VISIT (OUTPATIENT)
Dept: CARDIOLOGY CLINIC | Age: 25
End: 2020-10-13

## 2020-10-13 DIAGNOSIS — R06.02 SOB (SHORTNESS OF BREATH): ICD-10-CM

## 2020-10-13 DIAGNOSIS — Z98.890 S/P MVR (MITRAL VALVE REPAIR): ICD-10-CM

## 2020-10-13 DIAGNOSIS — R07.9 CHEST PAIN, UNSPECIFIED TYPE: Primary | ICD-10-CM

## 2020-10-13 DIAGNOSIS — Z98.890 H/O MITRAL VALVE REPAIR: ICD-10-CM

## 2020-10-13 NOTE — PROGRESS NOTES
ATTENTION:  
This medical record was transcribed using an electronic medical records/speech recognition system. Although proofread, it may and can contain electronic, spelling and other errors. Corrections may be executed at a later time. Please feel free to contact us for any clarifications as needed. ICD-10-CM ICD-9-CM 1. Chest pain, unspecified type  R07.9 786.50  
2. SOB (shortness of breath)  R06.02 786.05  
3. S/P MVR (mitral valve repair)  Z98.890 V45.89  
4. H/O mitral valve repair  Z98.890 V15.1 Malena Solis is a 22 y.o. female with non-rheumatic MR s/p MVR referred for follow up. Cardiac risk factors: none I have personally obtained the history from the patient. HISTORY OF PRESENTING ILLNESS Overall the pt states she is doing well. Pt denies any irregularity in her heart rhythm. Pt states that she exercises regularly and eats a healthy diet. The patient denies chest pain/ shortness of breath, orthopnea, PND, LE edema, palpitations, syncope, presyncope or fatigue. ACTIVE PROBLEM LIST Patient Active Problem List  
 Diagnosis Date Noted  MEDHAT (generalized anxiety disorder) 12/18/2018  S/P MVR (mitral valve repair) 12/13/2013  Mitral regurgitation 12/02/2013  Anemia, iron deficiency 01/16/2012  Scoliosis 12/07/2010 PAST MEDICAL HISTORY Past Medical History:  
Diagnosis Date  Anemia  Murmur  Valvular heart disease PAST SURGICAL HISTORY Past Surgical History:  
Procedure Laterality Date  CARDIAC SURG PROCEDURE UNLIST ALLERGIES Allergies Allergen Reactions  Amoxicillin Rash Skin peeling FAMILY HISTORY Family History Problem Relation Age of Onset  Cancer Father LYMPHOMA  Heart Disease Father Melia Estevez Other Mother Melia Estevez Arthritis-osteo Mother  Kidney Disease Mother ANKYLOSING SPONDYLITIS  Diabetes Maternal Grandmother  Hypertension Maternal Grandmother  Kidney Disease Maternal Grandmother  Cancer Maternal Aunt LUNG CA - SMOKER  
 Other Maternal Aunt SARCOIDOSIS  Anesth Problems Neg Hx   
 negative for cardiac disease SOCIAL HISTORY Social History Socioeconomic History  Marital status: SINGLE Spouse name: Not on file  Number of children: Not on file  Years of education: Not on file  Highest education level: Not on file Tobacco Use  Smoking status: Never Smoker  Smokeless tobacco: Never Used Substance and Sexual Activity  Alcohol use: No  
  Comment: rarely  Drug use: No  
 Sexual activity: Never MEDICATIONS Current Outpatient Medications Medication Sig  
 venlafaxine-SR (EFFEXOR-XR) 150 mg capsule take 1 capsule by mouth daily (MAINTENANCE DOSE)  ergocalciferol (ERGOCALCIFEROL) 1,250 mcg (50,000 unit) capsule Take 1 Cap by mouth every seven (7) days. No current facility-administered medications for this visit. I have reviewed the nurses notes, vitals, problem list, allergy list, medical history, family, social history and medications. REVIEW OF SYMPTOMS General: Pt denies excessive weight gain or loss. Pt is able to conduct ADL's HEENT: Denies blurred vision, headaches, hearing loss, epistaxis and difficulty swallowing. Respiratory: Denies cough, congestion, shortness of breath, WONG, wheezing or stridor. Cardiovascular: Denies precordial pain, palpitations, edema or PND Gastrointestinal: Denies poor appetite, indigestion, abdominal pain or blood in stool Genitourinary: Denies hematuria, dysuria, increased urinary frequency Musculoskeletal: Denies joint pain or swelling from muscles or joints Neurologic: Denies tremor, paresthesias, headache, or sensory motor disturbance Psychiatric: Denies confusion, insomnia, depression Integumentray: Denies rash, itching or ulcers. Hematologic: Denies easy bruising, bleeding PHYSICAL EXAMINATION There were no vitals filed for this visit. General: Well developed, in no acute distress. HEENT: No jaundice, oral mucosa moist, no oral ulcers Neck: Supple, no stiffness, no lymphadenopathy, supple Heart:  Normal S1/S2 negative S3 or S4. Regular, no murmur, gallop or rub, no jugular venous distention Respiratory: Clear bilaterally x 4, no wheezing or rales Abdomen:   Soft, non-tender, bowel sounds are active. Extremities:  No edema, normal cap refill, no cyanosis. Musculoskeletal: No clubbing, no deformities Neuro: A&Ox3, speech clear, gait stable, cooperative, no focal neurologic deficits Skin: Skin color is normal. No rashes or lesions. Non diaphoretic, moist. 
Vascular: 2+ pulses symmetric in all extremities EKG: SR 
 
 DIAGNOSTIC DATA 1. Echocardiograms  
(3/27/12) : EF 55-60% 
(11/18/13):60-65%, Mitral valve: Flail anterior mitral valve leaflet. There was a marked 
prolapse involving the medial segment of the anterior leaflet. There was 
moderate to severe regurgitation. 
(3/5/14): EF 55-60% 
(10/2/19)- EF 65 %, MV annular calcification- mild/mod, MR mild, surgical repair - MV repair 2013 2. Lipids 2/10/17- , HDL 59, , TG 40 
4/11/17- , HDL 62, , TG 47 
9/11/18- , HDL 69, LDL 81, TG 38 
 
3. Holter 3/5/14- ST  LABORATORY DATA Lab Results Component Value Date/Time WBC 4.2 05/30/2020 10:08 AM  
 HGB 12.5 05/30/2020 10:08 AM  
 HCT 41.3 05/30/2020 10:08 AM  
 PLATELET 980 28/67/2927 10:08 AM  
 MCV 74 (L) 05/30/2020 10:08 AM  
  
Lab Results Component Value Date/Time  Sodium 137 01/30/2020 08:15 AM  
 Potassium 4.0 01/30/2020 08:15 AM  
 Chloride 106 01/30/2020 08:15 AM  
 CO2 25 01/30/2020 08:15 AM  
 Anion gap 6 01/30/2020 08:15 AM  
 Glucose 79 01/30/2020 08:15 AM  
 BUN 15 01/30/2020 08:15 AM  
 Creatinine 0.67 01/30/2020 08:15 AM  
 BUN/Creatinine ratio 22 (H) 01/30/2020 08:15 AM  
 GFR est AA >60 01/30/2020 08:15 AM  
 GFR est non-AA >60 01/30/2020 08:15 AM  
 Calcium 8.6 01/30/2020 08:15 AM  
 Bilirubin, total 0.3 01/30/2020 08:15 AM  
 Alk. phosphatase 72 01/30/2020 08:15 AM  
 Protein, total 7.4 01/30/2020 08:15 AM  
 Albumin 3.7 01/30/2020 08:15 AM  
 Globulin 3.7 01/30/2020 08:15 AM  
 A-G Ratio 1.0 (L) 01/30/2020 08:15 AM  
 ALT (SGPT) 22 01/30/2020 08:15 AM  
  
 
 
 ASSESSMENT/RECOMMENDATIONS:.  
  
1. Non-rheumatic mitral regurgitation s/p valve repair 2014 
- last echo demonstrated mild MR 
- Will consider limited echo upon next visit. 2. Tachycardia  
- Her HR is better. I see no need for any negative chronotropic agents. 3. Return in 1 year or PRN. No orders of the defined types were placed in this encounter. Follow-up and Dispositions  · Return in about 6 months (around 4/13/2021). I have discussed the diagnosis with  Adeline Gonzales and the intended plan as seen in the above orders. Questions were answered concerning future plans. I have discussed medication side effects and warnings with the patient as well. Thank you,  Mariama Baker NP for involving me in the care of  Adeline Gonzales. Please do not hesitate to contact me for further questions/concerns. Olman Soliz MD, [facility] Patient Care Team: 
Mariama aBker NP as PCP - General (Nurse Practitioner) Mariama Baker NP as PCP - [facility] Empaneled Provider [place] LPN as Ambulatory Care Manager Duy Chaidez MD as Referring Provider (Cardiology) Delicia Baugh MD (Cardiothoracic Surgery) Lex Palmer MD (Hematology and Oncology) [address] [place] [address], [facility] [address]     
[phone] / [phone] Fax

## 2020-10-29 ENCOUNTER — OFFICE VISIT (OUTPATIENT)
Dept: CARDIOLOGY CLINIC | Age: 25
End: 2020-10-29
Payer: COMMERCIAL

## 2020-10-29 ENCOUNTER — ANCILLARY PROCEDURE (OUTPATIENT)
Dept: CARDIOLOGY CLINIC | Age: 25
End: 2020-10-29
Payer: COMMERCIAL

## 2020-10-29 VITALS
HEIGHT: 62 IN | BODY MASS INDEX: 25.4 KG/M2 | SYSTOLIC BLOOD PRESSURE: 120 MMHG | WEIGHT: 138 LBS | DIASTOLIC BLOOD PRESSURE: 76 MMHG

## 2020-10-29 VITALS
OXYGEN SATURATION: 96 % | WEIGHT: 138 LBS | HEART RATE: 98 BPM | HEIGHT: 61 IN | BODY MASS INDEX: 26.06 KG/M2 | DIASTOLIC BLOOD PRESSURE: 76 MMHG | SYSTOLIC BLOOD PRESSURE: 120 MMHG

## 2020-10-29 DIAGNOSIS — R06.02 SOB (SHORTNESS OF BREATH): ICD-10-CM

## 2020-10-29 DIAGNOSIS — R07.9 CHEST PAIN, UNSPECIFIED TYPE: Primary | ICD-10-CM

## 2020-10-29 DIAGNOSIS — Z98.890 S/P MVR (MITRAL VALVE REPAIR): ICD-10-CM

## 2020-10-29 PROCEDURE — 99214 OFFICE O/P EST MOD 30 MIN: CPT | Performed by: SPECIALIST

## 2020-10-29 PROCEDURE — 93308 TTE F-UP OR LMTD: CPT | Performed by: SPECIALIST

## 2020-10-29 PROCEDURE — 93000 ELECTROCARDIOGRAM COMPLETE: CPT | Performed by: SPECIALIST

## 2020-10-29 RX ORDER — METOPROLOL SUCCINATE 25 MG/1
12.5 TABLET, EXTENDED RELEASE ORAL DAILY
Qty: 30 TAB | Refills: 4 | Status: SHIPPED | OUTPATIENT
Start: 2020-10-29 | End: 2021-08-17

## 2020-10-29 NOTE — PROGRESS NOTES
Olivia Shanks is a 22 y.o. female    Visit Vitals  /76 (BP 1 Location: Left arm, BP Patient Position: Sitting)   Pulse 98   Ht 5' 1\" (1.549 m)   Wt 138 lb (62.6 kg)   SpO2 96%   BMI 26.07 kg/m²       Chief Complaint   Patient presents with    Irregular Heart Beat     TACHY    Other     MVR       Chest pain YES  SOB NO  Dizziness NO  Swelling NO  Recent hospital visit NO  Refills NO

## 2020-10-29 NOTE — PROGRESS NOTES
ATTENTION:   This medical record was transcribed using an electronic medical records/speech recognition system. Although proofread, it may and can contain electronic, spelling and other errors. Corrections may be executed at a later time. Please feel free to contact us for any clarifications as needed. ICD-10-CM ICD-9-CM   1. Chest pain, unspecified type  R07.9 786.50   2. SOB (shortness of breath)  R06.02 786.05   3. S/P MVR (mitral valve repair)  Z98.890 V45.89            Va Figueroa is a 22 y.o. female with non-rheumatic MR s/p MVR referred for follow up. Cardiac risk factors: none  I have personally obtained the history from the patient. HISTORY OF PRESENTING ILLNESS     She states that she has been doing well from a cardiac standpoint. She will note her heart beating at times a little fast particular when she lays down at night.        ACTIVE PROBLEM LIST     Patient Active Problem List    Diagnosis Date Noted    MEDHAT (generalized anxiety disorder) 12/18/2018    S/P MVR (mitral valve repair) 12/13/2013    Mitral regurgitation 12/02/2013    Anemia, iron deficiency 01/16/2012    Scoliosis 12/07/2010           PAST MEDICAL HISTORY     Past Medical History:   Diagnosis Date    Anemia     Murmur     Valvular heart disease            PAST SURGICAL HISTORY     Past Surgical History:   Procedure Laterality Date    CARDIAC SURG PROCEDURE UNLIST            ALLERGIES     Allergies   Allergen Reactions    Amoxicillin Rash     Skin peeling          FAMILY HISTORY     Family History   Problem Relation Age of Onset    Cancer Father         LYMPHOMA    Heart Disease Father     Other Mother     Arthritis-osteo Mother     Kidney Disease Mother         ANKYLOSING SPONDYLITIS    Diabetes Maternal Grandmother     Hypertension Maternal Grandmother     Kidney Disease Maternal Grandmother     Cancer Maternal Aunt         LUNG CA - SMOKER    Other Maternal Aunt         SARCOIDOSIS    Anesth Problems Neg Hx     negative for cardiac disease       SOCIAL HISTORY     Social History     Socioeconomic History    Marital status: SINGLE     Spouse name: Not on file    Number of children: Not on file    Years of education: Not on file    Highest education level: Not on file   Tobacco Use    Smoking status: Never Smoker    Smokeless tobacco: Never Used   Substance and Sexual Activity    Alcohol use: No     Comment: rarely    Drug use: No    Sexual activity: Never         MEDICATIONS     Current Outpatient Medications   Medication Sig    venlafaxine-SR (EFFEXOR-XR) 150 mg capsule take 1 capsule by mouth daily (MAINTENANCE DOSE)    ergocalciferol (ERGOCALCIFEROL) 1,250 mcg (50,000 unit) capsule Take 1 Cap by mouth every seven (7) days. No current facility-administered medications for this visit. I have reviewed the nurses notes, vitals, problem list, allergy list, medical history, family, social history and medications. REVIEW OF SYMPTOMS      General: Pt denies excessive weight gain or loss. Pt is able to conduct ADL's  HEENT: Denies blurred vision, headaches, hearing loss, epistaxis and difficulty swallowing. Respiratory: Denies cough, congestion, shortness of breath, WONG, wheezing or stridor. Cardiovascular: Denies precordial pain, palpitations, edema or PND  Gastrointestinal: Denies poor appetite, indigestion, abdominal pain or blood in stool  Genitourinary: Denies hematuria, dysuria, increased urinary frequency  Musculoskeletal: Denies joint pain or swelling from muscles or joints  Neurologic: Denies tremor, paresthesias, headache, or sensory motor disturbance  Psychiatric: Denies confusion, insomnia, depression  Integumentray: Denies rash, itching or ulcers.   Hematologic: Denies easy bruising, bleeding     PHYSICAL EXAMINATION      Vitals:    10/29/20 0943   BP: 120/76   Pulse: 98   SpO2: 96%   Weight: 138 lb (62.6 kg)   Height: 5' 1\" (1.549 m)     General: Well developed, in no acute distress. HEENT: No jaundice, oral mucosa moist, no oral ulcers  Neck: Supple, no stiffness, no lymphadenopathy, supple  Heart:  Normal S1/S2 negative S3 or S4. Regular, no murmur, gallop or rub, no jugular venous distention  Respiratory: Clear bilaterally x 4, no wheezing or rales  Abdomen:   Soft, non-tender, bowel sounds are active. Extremities:  No edema, normal cap refill, no cyanosis. Musculoskeletal: No clubbing, no deformities  Neuro: A&Ox3, speech clear, gait stable, cooperative, no focal neurologic deficits  Skin: Skin color is normal. No rashes or lesions. Non diaphoretic, moist.  Vascular: 2+ pulses symmetric in all extremities        EKG: SR     DIAGNOSTIC DATA     1. Echocardiograms   (3/27/12) : EF 55-60%  (11/18/13):60-65%, Mitral valve: Flail anterior mitral valve leaflet. There was a marked  prolapse involving the medial segment of the anterior leaflet. There was  moderate to severe regurgitation.  (3/5/14): EF 55-60%  (10/2/19)- EF 65 %, MV annular calcification- mild/mod, MR mild, surgical repair - MV repair 2013    2. Lipids  2/10/17- , HDL 59, , TG 40  4/11/17- , HDL 62, , TG 47  9/11/18- , HDL 69, LDL 81, TG 38    3.  Holter  3/5/14- ST          LABORATORY DATA            Lab Results   Component Value Date/Time    WBC 4.2 05/30/2020 10:08 AM    HGB 12.5 05/30/2020 10:08 AM    HCT 41.3 05/30/2020 10:08 AM    PLATELET 697 49/92/3531 10:08 AM    MCV 74 (L) 05/30/2020 10:08 AM      Lab Results   Component Value Date/Time    Sodium 137 01/30/2020 08:15 AM    Potassium 4.0 01/30/2020 08:15 AM    Chloride 106 01/30/2020 08:15 AM    CO2 25 01/30/2020 08:15 AM    Anion gap 6 01/30/2020 08:15 AM    Glucose 79 01/30/2020 08:15 AM    BUN 15 01/30/2020 08:15 AM    Creatinine 0.67 01/30/2020 08:15 AM    BUN/Creatinine ratio 22 (H) 01/30/2020 08:15 AM    GFR est AA >60 01/30/2020 08:15 AM    GFR est non-AA >60 01/30/2020 08:15 AM    Calcium 8.6 01/30/2020 08:15 AM    Bilirubin, total 0.3 01/30/2020 08:15 AM    Alk. phosphatase 72 01/30/2020 08:15 AM    Protein, total 7.4 01/30/2020 08:15 AM    Albumin 3.7 01/30/2020 08:15 AM    Globulin 3.7 01/30/2020 08:15 AM    A-G Ratio 1.0 (L) 01/30/2020 08:15 AM    ALT (SGPT) 22 01/30/2020 08:15 AM           ASSESSMENT/RECOMMENDATIONS:.      1. Non-rheumatic mitral regurgitation s/p valve repair 2014  -She had an echo in the past with mild mitral regurgitation after mitral annuloplasty ring was placed. I think we should check another limited echo looking much regurgitation  2. Tachycardia   -Order trial of low-dose of Toprol-XL 12.5 mg at night to see if it helps for heart rate down slightly  3. Return in 4 weeks or PRN. Orders Placed This Encounter    AMB POC EKG ROUTINE W/ 12 LEADS, INTER & REP     Order Specific Question:   Reason for Exam:     Answer:   TACHY          Follow-up and Dispositions  ·   Return in about 6 months (around 4/29/2021). I have discussed the diagnosis with  Dakota Atwood and the intended plan as seen in the above orders. Questions were answered concerning future plans. I have discussed medication side effects and warnings with the patient as well. Thank you,  Cortney Villatoro NP for involving me in the care of  Dakota Atwood. Please do not hesitate to contact me for further questions/concerns. Olman Sawyer MD, [facility]    Patient Care Team:  Cortney Villatoro NP as PCP - General (Nurse Practitioner)  Cortney Villatoro NP as PCP - [facility] Provider  Aleks Atkinson LPN as [facility]  Frank Díaz MD as Referring Provider (Cardiology)  Nat Mccollum MD (Cardiothoracic Surgery)  Fatimah Mccurdy MD (Hematology and Oncology)    [address], [address] [place] [address]     [address]      [phone] / [phone] Fax

## 2020-11-05 LAB
ECHO AO ROOT DIAM: 2.18 CM
ECHO LA MAJOR AXIS: 2.92 CM
ECHO LA MINOR AXIS: 1.79 CM
ECHO LV INTERNAL DIMENSION DIASTOLIC: 4.07 CM (ref 3.9–5.3)
ECHO LV INTERNAL DIMENSION SYSTOLIC: 2.4 CM
ECHO LV IVSD: 0.71 CM (ref 0.6–0.9)
ECHO LV MASS 2D: 93.8 G (ref 67–162)
ECHO LV MASS INDEX 2D: 57.4 G/M2 (ref 43–95)
ECHO LV POSTERIOR WALL DIASTOLIC: 0.86 CM (ref 0.6–0.9)
ECHO LVOT DIAM: 1.99 CM
ECHO MV MAX VELOCITY: 251.47 CM/S
ECHO MV MEAN GRADIENT: 11.94 MMHG
ECHO MV PEAK GRADIENT: 25.29 MMHG
ECHO MV VTI: 55.54 CM

## 2020-11-06 ENCOUNTER — TELEPHONE (OUTPATIENT)
Dept: CARDIOLOGY CLINIC | Age: 25
End: 2020-11-06

## 2020-11-06 NOTE — TELEPHONE ENCOUNTER
Pt states someone left message for her to call the office for  test results.  Please advise    [phone]  Phone:

## 2021-01-20 ENCOUNTER — NURSE TRIAGE (OUTPATIENT)
Dept: OTHER | Facility: CLINIC | Age: 26
End: 2021-01-20

## 2021-01-20 ENCOUNTER — VIRTUAL VISIT (OUTPATIENT)
Dept: FAMILY MEDICINE CLINIC | Age: 26
End: 2021-01-20
Payer: COMMERCIAL

## 2021-01-20 DIAGNOSIS — J06.9 VIRAL UPPER RESPIRATORY ILLNESS: Primary | ICD-10-CM

## 2021-01-20 PROCEDURE — 99213 OFFICE O/P EST LOW 20 MIN: CPT | Performed by: NURSE PRACTITIONER

## 2021-01-20 RX ORDER — BENZONATATE 200 MG/1
200 CAPSULE ORAL
Qty: 30 CAP | Refills: 0 | Status: SHIPPED | OUTPATIENT
Start: 2021-01-20 | End: 2021-01-30

## 2021-01-20 NOTE — TELEPHONE ENCOUNTER
Patient called [employer] with red flag complaint. Call received from the patient. Brief description of triage: see below. Triage indicates for patient to be seen today or tomorrow in the office. Care advice provided, patient verbalizes understanding; denies any other questions or concerns; instructed to call back for any new or worsening symptoms. Writer provided warm transfer to [place] [facility] [place] for appointment scheduling. Attention Provider: Thank you for allowing me to participate in the care of your patient. The patient was connected to triage in response to information from calling the [employer]. Please do not respond through this encounter as the response is not directed to a shared pool. Reason for Disposition   Patient wants to be seen    Answer Assessment - Initial Assessment Questions  1. ONSET: \"When did the cough begin? \"       Saturday. 2. SEVERITY: \"How bad is the cough today? \"       Not too bad unless she gets hot. 3. RESPIRATORY DISTRESS: \"Describe your breathing. \"       Normal.     4. FEVER: \"Do you have a fever? \" If so, ask: \"What is your temperature, how was it measured, and when did it start? \"      No     5. HEMOPTYSIS: \"Are you coughing up any blood? \" If so ask: \"How much? \" (flecks, streaks, tablespoons, etc.)      No     6. TREATMENT: \"What have you done so far to treat the cough? \" (e.g., meds, fluids, humidifier)      Nyquil, robitussin, and hot tea. 7. CARDIAC HISTORY: \"Do you have any history of heart disease? \" (e.g., heart attack, congestive heart failure)       Open heart surgery. Mitral valve repair. 8. LUNG HISTORY: \"Do you have any history of lung disease? \"  (e.g., pulmonary embolus, asthma, emphysema)      No     9. PE RISK FACTORS: \"Do you have a history of blood clots? \" (or: recent major surgery, recent prolonged travel, bedridden)      No     10.  OTHER SYMPTOMS: \"Do you have any other symptoms? (e.g., runny nose, wheezing, chest pain)        Runny nose, sore throat. 11. PREGNANCY: \"Is there any chance you are pregnant? \" \"When was your last menstrual period? \"        No LMP 12/30/2020     12. TRAVEL: \"Have you traveled out of the country in the last month? \" (e.g., travel history, exposures)        No    Protocols used: [id number]-WGMIV-RR

## 2021-01-20 NOTE — PATIENT INSTRUCTIONS
Coronavirus (CSO-39): Care Instructions Overview The coronavirus disease (COVID-19) is caused by a virus. Symptoms may include a fever, a cough, and shortness of breath. It mainly spreads person-to-person through droplets from coughing and sneezing. The virus also can spread when people are in close contact with someone who is infected. Most people have mild symptoms and can take care of themselves at home. If their symptoms get worse, they may need care in a hospital. Treatment may include medicines to reduce symptoms, plus breathing support such as oxygen therapy or a ventilator. It's important to not spread the virus to others. If you have COVID-19, wear a face cover anytime you are around other people. You need to isolate yourself while you are sick. Leave your home only if you need to get medical care or testing. Follow-up care is a key part of your treatment and safety. Be sure to make and go to all appointments, and call your doctor if you are having problems. It's also a good idea to know your test results and keep a list of the medicines you take. How can you care for yourself at home? · Get extra rest. It can help you feel better. · Drink plenty of fluids. This helps replace fluids lost from fever. Fluids also help ease a scratchy throat. Water, soup, fruit juice, and hot tea with lemon are good choices. · Take acetaminophen (such as Tylenol) to reduce a fever. It may also help with muscle aches. Read and follow all instructions on the label. · Use petroleum jelly on sore skin. This can help if the skin around your nose and lips becomes sore from rubbing a lot with tissues. Tips for self-isolation · Limit contact with people in your home. If possible, stay in a separate bedroom and use a separate bathroom. · Wear a cloth face cover when you are around other people. It can help stop the spread of the virus when you cough or sneeze. · If you have to leave home, avoid crowds and try to stay at least 6 feet away from other people. · Avoid contact with pets and other animals. · Cover your mouth and nose with a tissue when you cough or sneeze. Then throw it in the trash right away. · Wash your hands often, especially after you cough or sneeze. Use soap and water, and scrub for at least 20 seconds. If soap and water aren't available, use an alcohol-based hand . · Don't share personal household items. These include bedding, towels, cups and glasses, and eating utensils. · [address] in the warmest water allowed for the fabric type, and dry it completely. It's okay to wash other people's laundry with yours. · Clean and disinfect your home every day. Use household  and disinfectant wipes or sprays. Take special care to clean things that you grab with your hands. These include doorknobs, remote controls, phones, and handles on your refrigerator and microwave. And don't forget countertops, tabletops, bathrooms, and computer keyboards. When you can end self-isolation · If you know or suspect that you have COVID-19, stay in self-isolation until: 
? You haven't had a fever for 24 hours while not taking medicines to lower the fever, and 
? Your symptoms have improved, and 
? It's been at least 10 days since your symptoms started. · Talk to your doctor about whether you also need testing, especially if you have a weakened immune system. When should you call for help? Call 911 anytime you think you may need emergency care. For example, call if you have life-threatening symptoms, such as: 
  · You have severe trouble breathing. (You can't talk at all.)  
  · You have constant chest pain or pressure.  
  · You are severely dizzy or lightheaded.  
  · You are confused or can't think clearly.  
  · Your face and lips have a blue color.  
  · You pass out (lose consciousness) or are very hard to wake up. Call your doctor now or seek immediate medical care if: 
  · You have moderate trouble breathing. (You can't speak a full sentence.)  
  · You are coughing up blood (more than about 1 teaspoon).  
  · You have signs of low blood pressure. These include feeling lightheaded; being too weak to stand; and having cold, pale, clammy skin. Watch closely for changes in your health, and be sure to contact your doctor if: 
  · Your symptoms get worse.  
  · You are not getting better as expected. Call before you go to the doctor's office. Follow their instructions. And wear a cloth face cover. Current as of: December 18, 2020               Content Version: 12.7 © 2006-2021 [employer]wise, [employer]. Care instructions adapted under license by [employer] (which disclaims liability or warranty for this information). If you have questions about a medical condition or this instruction, always ask your healthcare professional. [address] any warranty or liability for your use of this information.

## 2021-01-20 NOTE — PROGRESS NOTES
Allan Walsh is a 22 y.o. female who was seen by synchronous (real-time) audio-video technology on 1/20/2021 for Cold Symptoms (Sore throat, cough and runny nose. )        Assessment & Plan:   Diagnoses and all orders for this visit:    1. Viral upper respiratory illness  Symptoms consistent with covid infection  Recommend testing, suggested options including [facility], [facility], [employer], [facility], [facility]. Patient to check web sites to register for testing time and check site procedures. She should isolate at home until results rec'd  She will notify our clinic of results once rec'd  Continue robitussin  Add tessalon for cough suppression  -     benzonatate (TESSALON) 200 mg capsule; Take 1 Cap by mouth three (3) times daily as needed for Cough for up to 10 days. Follow-up and Dispositions    · Return if symptoms worsen or fail to improve. I have discussed the diagnosis with the patient and the intended plan as seen in the above orders, and questions were answered concerning future plans. Patient conveyed understanding of the plan at the time of the visit. 712  Subjective:     HPI:  C/o 4-5 day hx of cough, congestion/runny nose, and sore throat. Robitussin and hot tea are relieving the symptoms. No fever or chills. No wheezing or shortness of breath. No change in sense of taste or smell. No nausea, vomiting, abdominal pain, or diarrhea. No known exposure to covid    Hx of mitral valve repair in 2013, has done well postoperatively with mild MR present, being followed by echo q6 months (Dr. Amita Harman). Prior to Admission medications    Medication Sig Start Date End Date Taking? Authorizing Provider   benzonatate (TESSALON) 200 mg capsule Take 1 Cap by mouth three (3) times daily as needed for Cough for up to 10 days. 1/20/21 1/30/21 Yes Yaquelin Laughlin NP   metoprolol succinate (TOPROL-XL) 25 mg XL tablet Take 0.5 Tabs by mouth daily.  10/29/20   Marianne Trinidad MD   venlafaxine-SR (EFFEXOR-XR) 150 mg capsule take 1 capsule by mouth daily (MAINTENANCE DOSE) 10/2/20   Molina Steven NP   ergocalciferol (ERGOCALCIFEROL) 1,250 mcg (50,000 unit) capsule Take 1 Cap by mouth every seven (7) days. 1/31/20   Patrick Mendoza NP     Patient Active Problem List   Diagnosis Code    Scoliosis M41.9    Anemia, iron deficiency D50.9    Mitral regurgitation I34.0    S/P MVR (mitral valve repair) Z98.890    MEDHAT (generalized anxiety disorder) F41.1     Allergies   Allergen Reactions    Amoxicillin Rash     Skin peeling     Past Medical History:   Diagnosis Date    Anemia     Murmur     Valvular heart disease      Past Surgical History:   Procedure Laterality Date    VT CARDIAC SURG PROCEDURE UNLIST       Family History   Problem Relation Age of Onset    Cancer Father         LYMPHOMA    Heart Disease Father     Other Mother     Arthritis-osteo Mother     Kidney Disease Mother         ANKYLOSING SPONDYLITIS    Diabetes Maternal Grandmother     Hypertension Maternal Grandmother     Kidney Disease Maternal Grandmother     Cancer Maternal Aunt         LUNG CA - SMOKER    Other Maternal Aunt         SARCOIDOSIS    Anesth Problems Neg Hx      Social History     Tobacco Use    Smoking status: Never Smoker    Smokeless tobacco: Never Used   Substance Use Topics    Alcohol use: No     Comment: rarely       Review of Systems   Constitutional: Positive for malaise/fatigue. Negative for chills, fever and weight loss. HENT: Positive for congestion and sore throat. Eyes: Negative for blurred vision. Respiratory: Positive for cough. Negative for hemoptysis, sputum production, shortness of breath and wheezing. Cardiovascular: Negative for chest pain and palpitations. Gastrointestinal: Negative for abdominal pain, diarrhea, heartburn, nausea and vomiting. Genitourinary: Negative. Musculoskeletal: Negative. Skin: Negative. Neurological: Negative for dizziness and headaches. Endo/Heme/Allergies: Negative. Psychiatric/Behavioral: Negative. Objective:   No flowsheet data found. General: alert, cooperative, no distress   Mental  status: normal mood, behavior, speech, dress, motor activity, and thought processes, able to follow commands   HENT: NCAT   Neck: no visualized mass   Resp: no respiratory distress   Neuro: no gross deficits   Skin: no discoloration or lesions of concern on visible areas   Psychiatric: normal affect, consistent with stated mood, no evidence of hallucinations     Additional exam findings:   none    We discussed the expected course, resolution and complications of the diagnosis(es) in detail. Medication risks, benefits, costs, interactions, and alternatives were discussed as indicated. I advised her to contact the office if her condition worsens, changes or fails to improve as anticipated. She expressed understanding with the diagnosis(es) and plan. Timothy Smith, who was evaluated through a patient-initiated, synchronous (real-time) audio-video encounter, and/or her healthcare decision maker, is aware that it is a billable service, with coverage as determined by her insurance carrier. She provided verbal consent to proceed: Yes, and patient identification was verified. It was conducted pursuant to the emergency declaration under the [address] [place] [address], [address] [place] [address] authority and the [employer] Act. A caregiver was present when appropriate. Ability to conduct physical exam was limited. I was at home. The patient was at home.       Laly Burgess NP  01/20/21

## 2021-05-10 NOTE — PROGRESS NOTES
Chief Complaint   Patient presents with    Labs     Fasting     Pt reports that she has been having increased frequency of anxiety attacks, pt reports that it is affecting her productivity at [employer]. Pt went to the ER last Saturday due to anxiety    Pt is requesting documentation for her job that she is being treated for an anxiety condition. Subjective: (As above and below)     Chief Complaint   Patient presents with    Labs     Fasting     she is a 21y.o. year old female who presents for evaluation. Reviewed PmHx, RxHx, FmHx, SocHx, AllgHx and updated in chart. Review of Systems - negative except as listed above    Objective:     Vitals:    09/11/18 0857   BP: 107/73   Pulse: 96   Resp: 16   Temp: 98.8 °F (37.1 °C)   TempSrc: Oral   SpO2: 99%   Weight: 115 lb (52.2 kg)   Height: 5' 1\" (1.549 m)     Physical Examination: General appearance - alert, well appearing, and in no distress  Mental status - normal mood, behavior, speech, dress, motor activity, and thought processes  Mouth - mucous membranes moist, pharynx normal without lesions  Chest - clear to auscultation, no wheezes, rales or rhonchi, symmetric air entry  Heart - normal rate, regular rhythm, normal S1, S2, no murmurs, rubs, clicks or gallops  Musculoskeletal - no joint tenderness, deformity or swelling  Extremities - peripheral pulses normal, no pedal edema, no clubbing or cyanosis    Assessment/ Plan:   1. Routine general medical examination at a health care facility  -check fasting labs  - METABOLIC PANEL, COMPREHENSIVE  - CBC WITH AUTOMATED DIFF  - LIPID PANEL  - HEMOGLOBIN A1C WITH EAG  - TSH 3RD GENERATION  - VITAMIN D, 25 HYDROXY    2. Anxiety  -advised pt to continue on lexapro daily, add buspar daily, keep klonopin for as needed panic attacks only. - busPIRone (BUSPAR) 10 mg tablet; Take 1 Tab by mouth two (2) times a day. Dispense: 60 Tab;  Refill: 2     Follow-up Disposition: As needed  I have discussed the diagnosis with Patient called again. Asked her to please wait until Dr. Plasencia can speak with her. She agreed but still is insistent that people have lied to her. Pharmacist called and asked if she called the patient about any discharge medications and she denied that she did so. Spoke to Dr. Plasencia and she will come to see patient shortly. Dr. Neumann returned message and asked that writer assure patient she may go 1k once she is medically stable and if she still insists on leaving to call mobile crisis. Patient updated on conversations with both MD's. She indicated she is not satisfied with Dr. Neumann's answer.    the patient and the intended plan as seen in the above orders. The patient has received an after-visit summary and questions were answered concerning future plans.      Medication Side Effects and Warnings were discussed with patient: yes  Patient Labs were reviewed: yes  Patient Past Records were reviewed:  yes    Scott Bradley M.D.

## 2021-05-24 ENCOUNTER — TELEPHONE (OUTPATIENT)
Dept: ONCOLOGY | Age: 26
End: 2021-05-24

## 2021-05-24 NOTE — TELEPHONE ENCOUNTER
[address] at [place] [address]  [phone]    05/24/21- Phone call placed to pt to remind pt to have labs drawn prior to his follow up appointment with .  left for patient.

## 2021-05-25 ENCOUNTER — TELEPHONE (OUTPATIENT)
Dept: ONCOLOGY | Age: 26
End: 2021-05-25

## 2021-05-25 NOTE — TELEPHONE ENCOUNTER
Patient left a voicemail that she needed her labs slip. She hasn't gotten her labs done.       Called patient back and left voicemail that we could send lab slip to the [facility] of her choice, just let us know which labs she needs to go too

## 2021-05-29 LAB
ERYTHROCYTE [DISTWIDTH] IN BLOOD BY AUTOMATED COUNT: 13.8 % (ref 11.7–15.4)
FERRITIN SERPL-MCNC: 29 NG/ML (ref 15–150)
HCT VFR BLD AUTO: 40.9 % (ref 34–46.6)
HGB BLD-MCNC: 12.4 G/DL (ref 11.1–15.9)
IRON SATN MFR SERPL: 13 % (ref 15–55)
IRON SERPL-MCNC: 47 UG/DL (ref 27–159)
MCH RBC QN AUTO: 22.1 PG (ref 26.6–33)
MCHC RBC AUTO-ENTMCNC: 30.3 G/DL (ref 31.5–35.7)
MCV RBC AUTO: 73 FL (ref 79–97)
PLATELET # BLD AUTO: 222 X10E3/UL (ref 150–450)
RBC # BLD AUTO: 5.62 X10E6/UL (ref 3.77–5.28)
TIBC SERPL-MCNC: 358 UG/DL (ref 250–450)
UIBC SERPL-MCNC: 311 UG/DL (ref 131–425)
WBC # BLD AUTO: 4 X10E3/UL (ref 3.4–10.8)

## 2021-05-30 NOTE — PROGRESS NOTES
[facility] [place] at [address], [address] [facility]  Zaida Garciay: [phone]  F: [phone]      Reason for Visit:   Florence Zapata is a 22 y.o. female who is seen for follow up of anemia. History of Present Illness:   She returns for follow up. Changed her last name since last here. More fatigue recently, some of this she attributes to work. No lightheadedness. No dyspnea. Some recurrence of her pica recently, as well as recurrent bruising. Menses recently have been light. No other bleeding. Review of systems was obtained and pertinent findings reviewed above. Past medical history, social history, family history, medications, and allergies are located in the electronic medical record. Physical Exam:     Visit Vitals  /69 (BP 1 Location: Left upper arm, BP Patient Position: Sitting, BP Cuff Size: Large adult) Comment: . Pulse 90   Temp 97.6 °F (36.4 °C) (Temporal)   Resp 16   Ht 5' 2\" (1.575 m)   Wt 135 lb (61.2 kg)   SpO2 99%   BMI 24.69 kg/m²     General: no distress  Respiratory: normal respiratory effort  CV: no peripheral edema  Skin: no rashes; no ecchymoses; no petechiae      Results:     Lab Results   Component Value Date/Time    WBC 4.0 05/28/2021 03:04 PM    HGB 12.4 05/28/2021 03:04 PM    HCT 40.9 05/28/2021 03:04 PM    PLATELET 262 89/66/8762 03:04 PM    MCV 73 (L) 05/28/2021 03:04 PM    ABS.  NEUTROPHILS 1.9 05/30/2020 10:08 AM     Lab Results   Component Value Date/Time    Sodium 137 01/30/2020 08:15 AM    Potassium 4.0 01/30/2020 08:15 AM    Chloride 106 01/30/2020 08:15 AM    CO2 25 01/30/2020 08:15 AM    Glucose 79 01/30/2020 08:15 AM    BUN 15 01/30/2020 08:15 AM    Creatinine 0.67 01/30/2020 08:15 AM    GFR est AA >60 01/30/2020 08:15 AM    GFR est non-AA >60 01/30/2020 08:15 AM    Calcium 8.6 01/30/2020 08:15 AM    Glucose (POC) 108 (H) 12/15/2013 07:26 AM     Lab Results   Component Value Date/Time    Bilirubin, total 0.3 01/30/2020 08:15 AM    ALT (SGPT) 22 01/30/2020 08:15 AM    Alk. phosphatase 72 01/30/2020 08:15 AM    Protein, total 7.4 01/30/2020 08:15 AM    Albumin 3.7 01/30/2020 08:15 AM    Globulin 3.7 01/30/2020 08:15 AM     Lab Results   Component Value Date/Time    Reticulocyte count 0.7 09/27/2016 04:29 PM    Iron % saturation 13 (L) 05/28/2021 03:04 PM    TIBC 358 05/28/2021 03:04 PM    Ferritin 29 05/28/2021 03:04 PM    Haptoglobin 99 09/27/2016 04:29 PM     09/27/2016 04:29 PM    Sed rate (ESR) 59 (H) 10/20/2011 11:40 AM    TSH 0.68 01/30/2020 08:15 AM    Lipase 153 11/11/2014 12:25 PM     Lab Results   Component Value Date/Time    INR 1.1 12/16/2013 04:00 AM    aPTT 30.3 (H) 12/16/2013 04:00 AM    D-Dimer 0.28 02/15/2017 04:31 PM    Factor VIII Activity 139 07/29/2016 12:17 PM    von Willebrand Factor (vWF) Ag 104 07/29/2016 12:17 PM    vWF Activity 71 07/29/2016 12:17 PM     Ferritin   Date Value   05/28/2021 29 ng/mL   05/30/2020 106 ng/mL   01/30/2020 10 NG/ML (L)   06/19/2019 86 ng/mL   12/14/2018 263 ng/mL (H)   09/19/2018 7 ng/mL (L)   02/21/2018 11 ng/mL (L)   08/09/2017 17 NG/ML   01/25/2017 79 ng/mL   09/27/2016 317 ng/mL (H)   08/22/2016 565 ng/mL (H)   07/29/2016 7 NG/ML (L)       9/27/2016:  Hemoglobin fractionation: normal    1/25/2017:  HGB: 13.4  WBC: 5.7  PLT: 260  MCV: 71  Iron saturation: 31%  Ferritin: 79    1/25/2017:  Smear: Microcytosis offset by a high normal RBC count, suggestive of minor thalassemia or innocent hemoglobinopathy.     US pelvis, US transvaginal 7/13/2016: normal    EGD and colonoscopy on 10/22/18 with Dr. Rahat Sheppard normal.     Assessment:   1) Anemia, iron deficiency  Recurrent problems. Has required IV iron in 8/2016, 10/2018, and 3/2020. Anemia resolves with each treatment. She has been unable to take oral iron. Hemoglobin remains normal.  However, ferritin has dropped over the past year to 29. She remains at risk of recurrent anemia.   She is having progressive fatigue which may be related to her iron deficiency. She would like to proceed with additional IV iron now. Repeat labs in 6 months. The cause of her iron deficiency is thought to be menorrhagia. GI evaluation was unremarkable. 2) Menorrhagia  Improved, off OCPs. VWF panel was negative. 3) Microcytosis  Persists even when iron deficiency is treated. Hemoglobin fractionation was normal.  Possible alpha thal?  Check mutation testing with next set of labs.       Plan:     · Injectafer 750mg IV weekly for 2 doses  · Labs in 6 months: CBC, iron profile, ferritin, alpha thal mutation (she prefers [facility])  · Return to see me in 6 months    Halima Dee for virtual visit    Signed By: Melissa Patiño MD

## 2021-06-04 ENCOUNTER — OFFICE VISIT (OUTPATIENT)
Dept: ONCOLOGY | Age: 26
End: 2021-06-04
Payer: COMMERCIAL

## 2021-06-04 VITALS
BODY MASS INDEX: 24.84 KG/M2 | HEIGHT: 62 IN | SYSTOLIC BLOOD PRESSURE: 118 MMHG | TEMPERATURE: 97.6 F | WEIGHT: 135 LBS | DIASTOLIC BLOOD PRESSURE: 69 MMHG | RESPIRATION RATE: 16 BRPM | OXYGEN SATURATION: 99 % | HEART RATE: 90 BPM

## 2021-06-04 DIAGNOSIS — D50.9 IRON DEFICIENCY ANEMIA, UNSPECIFIED IRON DEFICIENCY ANEMIA TYPE: Primary | ICD-10-CM

## 2021-06-04 DIAGNOSIS — D50.9 MICROCYTIC ANEMIA: ICD-10-CM

## 2021-06-04 PROCEDURE — 99214 OFFICE O/P EST MOD 30 MIN: CPT | Performed by: INTERNAL MEDICINE

## 2021-06-04 RX ORDER — SODIUM CHLORIDE 0.9 % (FLUSH) 0.9 %
10 SYRINGE (ML) INJECTION AS NEEDED
Status: CANCELLED | OUTPATIENT
Start: 2021-06-18

## 2021-06-04 RX ORDER — DIPHENHYDRAMINE HYDROCHLORIDE 50 MG/ML
50 INJECTION, SOLUTION INTRAMUSCULAR; INTRAVENOUS AS NEEDED
Status: CANCELLED
Start: 2021-06-11

## 2021-06-04 RX ORDER — DIPHENHYDRAMINE HYDROCHLORIDE 50 MG/ML
50 INJECTION, SOLUTION INTRAMUSCULAR; INTRAVENOUS AS NEEDED
Status: CANCELLED
Start: 2021-06-18

## 2021-06-04 RX ORDER — ALBUTEROL SULFATE 0.83 MG/ML
2.5 SOLUTION RESPIRATORY (INHALATION) AS NEEDED
Status: CANCELLED
Start: 2021-06-11

## 2021-06-04 RX ORDER — SODIUM CHLORIDE 9 MG/ML
10 INJECTION INTRAMUSCULAR; INTRAVENOUS; SUBCUTANEOUS AS NEEDED
Status: CANCELLED | OUTPATIENT
Start: 2021-06-11

## 2021-06-04 RX ORDER — HEPARIN 100 UNIT/ML
300-500 SYRINGE INTRAVENOUS AS NEEDED
Status: CANCELLED
Start: 2021-06-11

## 2021-06-04 RX ORDER — DIPHENHYDRAMINE HYDROCHLORIDE 50 MG/ML
25 INJECTION, SOLUTION INTRAMUSCULAR; INTRAVENOUS AS NEEDED
Status: CANCELLED
Start: 2021-06-18

## 2021-06-04 RX ORDER — ONDANSETRON 2 MG/ML
8 INJECTION INTRAMUSCULAR; INTRAVENOUS AS NEEDED
Status: CANCELLED | OUTPATIENT
Start: 2021-06-11

## 2021-06-04 RX ORDER — EPINEPHRINE 1 MG/ML
0.3 INJECTION, SOLUTION, CONCENTRATE INTRAVENOUS AS NEEDED
Status: CANCELLED | OUTPATIENT
Start: 2021-06-11

## 2021-06-04 RX ORDER — HEPARIN 100 UNIT/ML
300-500 SYRINGE INTRAVENOUS AS NEEDED
Status: CANCELLED
Start: 2021-06-18

## 2021-06-04 RX ORDER — DIPHENHYDRAMINE HYDROCHLORIDE 50 MG/ML
25 INJECTION, SOLUTION INTRAMUSCULAR; INTRAVENOUS AS NEEDED
Status: CANCELLED
Start: 2021-06-11

## 2021-06-04 RX ORDER — HYDROCORTISONE SODIUM SUCCINATE 100 MG/2ML
100 INJECTION, POWDER, FOR SOLUTION INTRAMUSCULAR; INTRAVENOUS AS NEEDED
Status: CANCELLED | OUTPATIENT
Start: 2021-06-18

## 2021-06-04 RX ORDER — ACETAMINOPHEN 325 MG/1
650 TABLET ORAL AS NEEDED
Status: CANCELLED
Start: 2021-06-18

## 2021-06-04 RX ORDER — ALBUTEROL SULFATE 0.83 MG/ML
2.5 SOLUTION RESPIRATORY (INHALATION) AS NEEDED
Status: CANCELLED
Start: 2021-06-18

## 2021-06-04 RX ORDER — SODIUM CHLORIDE 9 MG/ML
25 INJECTION, SOLUTION INTRAVENOUS CONTINUOUS
Status: CANCELLED | OUTPATIENT
Start: 2021-06-18

## 2021-06-04 RX ORDER — SODIUM CHLORIDE 9 MG/ML
25 INJECTION, SOLUTION INTRAVENOUS CONTINUOUS
Status: CANCELLED | OUTPATIENT
Start: 2021-06-11

## 2021-06-04 RX ORDER — ACETAMINOPHEN 325 MG/1
650 TABLET ORAL AS NEEDED
Status: CANCELLED
Start: 2021-06-11

## 2021-06-04 RX ORDER — HYDROCORTISONE SODIUM SUCCINATE 100 MG/2ML
100 INJECTION, POWDER, FOR SOLUTION INTRAMUSCULAR; INTRAVENOUS AS NEEDED
Status: CANCELLED | OUTPATIENT
Start: 2021-06-11

## 2021-06-04 RX ORDER — SODIUM CHLORIDE 0.9 % (FLUSH) 0.9 %
10 SYRINGE (ML) INJECTION AS NEEDED
Status: CANCELLED | OUTPATIENT
Start: 2021-06-11

## 2021-06-04 RX ORDER — SODIUM CHLORIDE 9 MG/ML
10 INJECTION INTRAMUSCULAR; INTRAVENOUS; SUBCUTANEOUS AS NEEDED
Status: CANCELLED | OUTPATIENT
Start: 2021-06-18

## 2021-06-04 RX ORDER — EPINEPHRINE 1 MG/ML
0.3 INJECTION, SOLUTION, CONCENTRATE INTRAVENOUS AS NEEDED
Status: CANCELLED | OUTPATIENT
Start: 2021-06-18

## 2021-06-04 RX ORDER — ONDANSETRON 2 MG/ML
8 INJECTION INTRAMUSCULAR; INTRAVENOUS AS NEEDED
Status: CANCELLED | OUTPATIENT
Start: 2021-06-18

## 2021-06-04 NOTE — PROGRESS NOTES
Kristina Shun is a 22 y.o. female follow up for anemia evaluation. 1. Have you been to the ER, urgent care clinic since your last visit? Hospitalized since your last visit?no     2. Have you seen or consulted any other health care providers outside of the [address] [place] [address] since your last visit? Include any pap smears or colon screening.  no

## 2021-06-09 RX ORDER — DIPHENHYDRAMINE HYDROCHLORIDE 50 MG/ML
50 INJECTION, SOLUTION INTRAMUSCULAR; INTRAVENOUS AS NEEDED
Status: CANCELLED
Start: 2021-07-09

## 2021-06-09 RX ORDER — HYDROCORTISONE SODIUM SUCCINATE 100 MG/2ML
100 INJECTION, POWDER, FOR SOLUTION INTRAMUSCULAR; INTRAVENOUS AS NEEDED
Status: CANCELLED | OUTPATIENT
Start: 2021-07-09

## 2021-06-09 RX ORDER — ONDANSETRON 2 MG/ML
8 INJECTION INTRAMUSCULAR; INTRAVENOUS AS NEEDED
Status: CANCELLED | OUTPATIENT
Start: 2021-07-09

## 2021-06-09 RX ORDER — HEPARIN 100 UNIT/ML
300-500 SYRINGE INTRAVENOUS AS NEEDED
Status: CANCELLED
Start: 2021-06-22

## 2021-06-09 RX ORDER — ACETAMINOPHEN 325 MG/1
650 TABLET ORAL AS NEEDED
Status: CANCELLED
Start: 2021-06-18

## 2021-06-09 RX ORDER — DIPHENHYDRAMINE HYDROCHLORIDE 50 MG/ML
12.5 INJECTION, SOLUTION INTRAMUSCULAR; INTRAVENOUS ONCE
Status: CANCELLED
Start: 2021-06-22 | End: 2021-06-17

## 2021-06-09 RX ORDER — EPINEPHRINE 1 MG/ML
0.3 INJECTION, SOLUTION, CONCENTRATE INTRAVENOUS AS NEEDED
Status: CANCELLED | OUTPATIENT
Start: 2021-07-02

## 2021-06-09 RX ORDER — DIPHENHYDRAMINE HYDROCHLORIDE 50 MG/ML
12.5 INJECTION, SOLUTION INTRAMUSCULAR; INTRAVENOUS ONCE
Status: CANCELLED
Start: 2021-07-06 | End: 2021-07-01

## 2021-06-09 RX ORDER — DIPHENHYDRAMINE HYDROCHLORIDE 50 MG/ML
25 INJECTION, SOLUTION INTRAMUSCULAR; INTRAVENOUS AS NEEDED
Status: CANCELLED
Start: 2021-07-06

## 2021-06-09 RX ORDER — HYDROCORTISONE SODIUM SUCCINATE 100 MG/2ML
100 INJECTION, POWDER, FOR SOLUTION INTRAMUSCULAR; INTRAVENOUS AS NEEDED
Status: CANCELLED | OUTPATIENT
Start: 2021-06-22

## 2021-06-09 RX ORDER — ONDANSETRON 2 MG/ML
8 INJECTION INTRAMUSCULAR; INTRAVENOUS AS NEEDED
Status: CANCELLED | OUTPATIENT
Start: 2021-06-18

## 2021-06-09 RX ORDER — ONDANSETRON 2 MG/ML
8 INJECTION INTRAMUSCULAR; INTRAVENOUS AS NEEDED
Status: CANCELLED | OUTPATIENT
Start: 2021-06-22

## 2021-06-09 RX ORDER — ALBUTEROL SULFATE 0.83 MG/ML
2.5 SOLUTION RESPIRATORY (INHALATION) AS NEEDED
Status: CANCELLED
Start: 2021-07-02

## 2021-06-09 RX ORDER — EPINEPHRINE 1 MG/ML
0.3 INJECTION, SOLUTION, CONCENTRATE INTRAVENOUS AS NEEDED
Status: CANCELLED | OUTPATIENT
Start: 2021-06-22

## 2021-06-09 RX ORDER — HEPARIN 100 UNIT/ML
300-500 SYRINGE INTRAVENOUS AS NEEDED
Status: CANCELLED
Start: 2021-07-06

## 2021-06-09 RX ORDER — DIPHENHYDRAMINE HYDROCHLORIDE 50 MG/ML
25 INJECTION, SOLUTION INTRAMUSCULAR; INTRAVENOUS AS NEEDED
Status: CANCELLED
Start: 2021-06-18

## 2021-06-09 RX ORDER — DIPHENHYDRAMINE HYDROCHLORIDE 50 MG/ML
50 INJECTION, SOLUTION INTRAMUSCULAR; INTRAVENOUS AS NEEDED
Status: CANCELLED
Start: 2021-06-22

## 2021-06-09 RX ORDER — ALBUTEROL SULFATE 0.83 MG/ML
2.5 SOLUTION RESPIRATORY (INHALATION) AS NEEDED
Status: CANCELLED
Start: 2021-07-09

## 2021-06-09 RX ORDER — EPINEPHRINE 1 MG/ML
0.3 INJECTION, SOLUTION, CONCENTRATE INTRAVENOUS AS NEEDED
Status: CANCELLED | OUTPATIENT
Start: 2021-07-09

## 2021-06-09 RX ORDER — DIPHENHYDRAMINE HYDROCHLORIDE 50 MG/ML
25 INJECTION, SOLUTION INTRAMUSCULAR; INTRAVENOUS AS NEEDED
Status: CANCELLED
Start: 2021-07-09

## 2021-06-09 RX ORDER — ACETAMINOPHEN 325 MG/1
650 TABLET ORAL ONCE
Status: CANCELLED
Start: 2021-07-06 | End: 2021-07-01

## 2021-06-09 RX ORDER — SODIUM CHLORIDE 9 MG/ML
10 INJECTION INTRAMUSCULAR; INTRAVENOUS; SUBCUTANEOUS AS NEEDED
Status: CANCELLED | OUTPATIENT
Start: 2021-06-22

## 2021-06-09 RX ORDER — EPINEPHRINE 1 MG/ML
0.3 INJECTION, SOLUTION, CONCENTRATE INTRAVENOUS AS NEEDED
Status: CANCELLED | OUTPATIENT
Start: 2021-07-06

## 2021-06-09 RX ORDER — SODIUM CHLORIDE 9 MG/ML
10 INJECTION INTRAMUSCULAR; INTRAVENOUS; SUBCUTANEOUS AS NEEDED
Status: CANCELLED | OUTPATIENT
Start: 2021-07-09

## 2021-06-09 RX ORDER — HEPARIN 100 UNIT/ML
300-500 SYRINGE INTRAVENOUS AS NEEDED
Status: CANCELLED
Start: 2021-07-02

## 2021-06-09 RX ORDER — ACETAMINOPHEN 325 MG/1
650 TABLET ORAL AS NEEDED
Status: CANCELLED
Start: 2021-07-06

## 2021-06-09 RX ORDER — SODIUM CHLORIDE 9 MG/ML
10 INJECTION INTRAMUSCULAR; INTRAVENOUS; SUBCUTANEOUS AS NEEDED
Status: CANCELLED | OUTPATIENT
Start: 2021-07-02

## 2021-06-09 RX ORDER — DIPHENHYDRAMINE HYDROCHLORIDE 50 MG/ML
25 INJECTION, SOLUTION INTRAMUSCULAR; INTRAVENOUS AS NEEDED
Status: CANCELLED
Start: 2021-07-02

## 2021-06-09 RX ORDER — HYDROCORTISONE SODIUM SUCCINATE 100 MG/2ML
100 INJECTION, POWDER, FOR SOLUTION INTRAMUSCULAR; INTRAVENOUS AS NEEDED
Status: CANCELLED | OUTPATIENT
Start: 2021-06-18

## 2021-06-09 RX ORDER — SODIUM CHLORIDE 9 MG/ML
10 INJECTION INTRAMUSCULAR; INTRAVENOUS; SUBCUTANEOUS AS NEEDED
Status: CANCELLED | OUTPATIENT
Start: 2021-06-18

## 2021-06-09 RX ORDER — DIPHENHYDRAMINE HYDROCHLORIDE 50 MG/ML
50 INJECTION, SOLUTION INTRAMUSCULAR; INTRAVENOUS AS NEEDED
Status: CANCELLED
Start: 2021-06-18

## 2021-06-09 RX ORDER — DIPHENHYDRAMINE HYDROCHLORIDE 50 MG/ML
12.5 INJECTION, SOLUTION INTRAMUSCULAR; INTRAVENOUS ONCE
Status: CANCELLED
Start: 2021-06-18 | End: 2021-06-10

## 2021-06-09 RX ORDER — DIPHENHYDRAMINE HYDROCHLORIDE 50 MG/ML
12.5 INJECTION, SOLUTION INTRAMUSCULAR; INTRAVENOUS ONCE
Status: CANCELLED
Start: 2021-07-02 | End: 2021-06-24

## 2021-06-09 RX ORDER — HEPARIN 100 UNIT/ML
300-500 SYRINGE INTRAVENOUS AS NEEDED
Status: CANCELLED
Start: 2021-06-18

## 2021-06-09 RX ORDER — HEPARIN 100 UNIT/ML
300-500 SYRINGE INTRAVENOUS AS NEEDED
Status: CANCELLED
Start: 2021-07-09

## 2021-06-09 RX ORDER — ACETAMINOPHEN 325 MG/1
650 TABLET ORAL ONCE
Status: CANCELLED
Start: 2021-07-09 | End: 2021-07-08

## 2021-06-09 RX ORDER — SODIUM CHLORIDE 0.9 % (FLUSH) 0.9 %
10 SYRINGE (ML) INJECTION AS NEEDED
Status: CANCELLED | OUTPATIENT
Start: 2021-06-18

## 2021-06-09 RX ORDER — DIPHENHYDRAMINE HYDROCHLORIDE 50 MG/ML
25 INJECTION, SOLUTION INTRAMUSCULAR; INTRAVENOUS AS NEEDED
Status: CANCELLED
Start: 2021-06-22

## 2021-06-09 RX ORDER — EPINEPHRINE 1 MG/ML
0.3 INJECTION, SOLUTION, CONCENTRATE INTRAVENOUS AS NEEDED
Status: CANCELLED | OUTPATIENT
Start: 2021-06-18

## 2021-06-09 RX ORDER — DIPHENHYDRAMINE HYDROCHLORIDE 50 MG/ML
12.5 INJECTION, SOLUTION INTRAMUSCULAR; INTRAVENOUS ONCE
Status: CANCELLED
Start: 2021-07-09 | End: 2021-07-08

## 2021-06-09 RX ORDER — HYDROCORTISONE SODIUM SUCCINATE 100 MG/2ML
100 INJECTION, POWDER, FOR SOLUTION INTRAMUSCULAR; INTRAVENOUS AS NEEDED
Status: CANCELLED | OUTPATIENT
Start: 2021-07-06

## 2021-06-09 RX ORDER — SODIUM CHLORIDE 0.9 % (FLUSH) 0.9 %
10 SYRINGE (ML) INJECTION AS NEEDED
Status: CANCELLED | OUTPATIENT
Start: 2021-07-06

## 2021-06-09 RX ORDER — ACETAMINOPHEN 325 MG/1
650 TABLET ORAL AS NEEDED
Status: CANCELLED
Start: 2021-07-09

## 2021-06-09 RX ORDER — ONDANSETRON 2 MG/ML
8 INJECTION INTRAMUSCULAR; INTRAVENOUS AS NEEDED
Status: CANCELLED | OUTPATIENT
Start: 2021-07-06

## 2021-06-09 RX ORDER — ACETAMINOPHEN 325 MG/1
650 TABLET ORAL AS NEEDED
Status: CANCELLED
Start: 2021-07-02

## 2021-06-09 RX ORDER — SODIUM CHLORIDE 0.9 % (FLUSH) 0.9 %
10 SYRINGE (ML) INJECTION AS NEEDED
Status: CANCELLED | OUTPATIENT
Start: 2021-07-02

## 2021-06-09 RX ORDER — DIPHENHYDRAMINE HYDROCHLORIDE 50 MG/ML
50 INJECTION, SOLUTION INTRAMUSCULAR; INTRAVENOUS AS NEEDED
Status: CANCELLED
Start: 2021-07-06

## 2021-06-09 RX ORDER — ACETAMINOPHEN 325 MG/1
650 TABLET ORAL ONCE
Status: CANCELLED
Start: 2021-06-18 | End: 2021-06-10

## 2021-06-09 RX ORDER — ALBUTEROL SULFATE 0.83 MG/ML
2.5 SOLUTION RESPIRATORY (INHALATION) AS NEEDED
Status: CANCELLED
Start: 2021-07-06

## 2021-06-09 RX ORDER — ONDANSETRON 2 MG/ML
8 INJECTION INTRAMUSCULAR; INTRAVENOUS AS NEEDED
Status: CANCELLED | OUTPATIENT
Start: 2021-07-02

## 2021-06-09 RX ORDER — DIPHENHYDRAMINE HYDROCHLORIDE 50 MG/ML
50 INJECTION, SOLUTION INTRAMUSCULAR; INTRAVENOUS AS NEEDED
Status: CANCELLED
Start: 2021-07-02

## 2021-06-09 RX ORDER — SODIUM CHLORIDE 9 MG/ML
10 INJECTION INTRAMUSCULAR; INTRAVENOUS; SUBCUTANEOUS AS NEEDED
Status: CANCELLED | OUTPATIENT
Start: 2021-07-06

## 2021-06-09 RX ORDER — SODIUM CHLORIDE 0.9 % (FLUSH) 0.9 %
10 SYRINGE (ML) INJECTION AS NEEDED
Status: CANCELLED | OUTPATIENT
Start: 2021-07-09

## 2021-06-09 RX ORDER — SODIUM CHLORIDE 0.9 % (FLUSH) 0.9 %
10 SYRINGE (ML) INJECTION AS NEEDED
Status: CANCELLED | OUTPATIENT
Start: 2021-06-22

## 2021-06-09 RX ORDER — HYDROCORTISONE SODIUM SUCCINATE 100 MG/2ML
100 INJECTION, POWDER, FOR SOLUTION INTRAMUSCULAR; INTRAVENOUS AS NEEDED
Status: CANCELLED | OUTPATIENT
Start: 2021-07-02

## 2021-06-09 RX ORDER — ACETAMINOPHEN 325 MG/1
650 TABLET ORAL ONCE
Status: CANCELLED
Start: 2021-06-22 | End: 2021-06-17

## 2021-06-09 RX ORDER — ALBUTEROL SULFATE 0.83 MG/ML
2.5 SOLUTION RESPIRATORY (INHALATION) AS NEEDED
Status: CANCELLED
Start: 2021-06-18

## 2021-06-09 RX ORDER — ALBUTEROL SULFATE 0.83 MG/ML
2.5 SOLUTION RESPIRATORY (INHALATION) AS NEEDED
Status: CANCELLED
Start: 2021-06-22

## 2021-06-09 RX ORDER — ACETAMINOPHEN 325 MG/1
650 TABLET ORAL AS NEEDED
Status: CANCELLED
Start: 2021-06-22

## 2021-06-09 RX ORDER — ACETAMINOPHEN 325 MG/1
650 TABLET ORAL ONCE
Status: CANCELLED
Start: 2021-07-02 | End: 2021-06-24

## 2021-06-18 ENCOUNTER — HOSPITAL ENCOUNTER (OUTPATIENT)
Dept: INFUSION THERAPY | Age: 26
Discharge: HOME | End: 2021-06-18
Payer: COMMERCIAL

## 2021-06-18 VITALS
TEMPERATURE: 96.8 F | SYSTOLIC BLOOD PRESSURE: 112 MMHG | RESPIRATION RATE: 16 BRPM | DIASTOLIC BLOOD PRESSURE: 59 MMHG | OXYGEN SATURATION: 100 % | HEART RATE: 89 BPM

## 2021-06-18 DIAGNOSIS — D50.0 IRON DEFICIENCY ANEMIA DUE TO CHRONIC BLOOD LOSS: ICD-10-CM

## 2021-06-18 DIAGNOSIS — D50.9 IRON DEFICIENCY ANEMIA, UNSPECIFIED IRON DEFICIENCY ANEMIA TYPE: Primary | ICD-10-CM

## 2021-06-18 PROCEDURE — 74011250637 HC RX REV CODE- 250/637: Performed by: NURSE PRACTITIONER

## 2021-06-18 PROCEDURE — 96375 TX/PRO/DX INJ NEW DRUG ADDON: CPT

## 2021-06-18 PROCEDURE — 74011250636 HC RX REV CODE- 250/636: Performed by: NURSE PRACTITIONER

## 2021-06-18 PROCEDURE — 74011000258 HC RX REV CODE- 258: Performed by: NURSE PRACTITIONER

## 2021-06-18 PROCEDURE — 96365 THER/PROPH/DIAG IV INF INIT: CPT

## 2021-06-18 RX ORDER — ACETAMINOPHEN 325 MG/1
650 TABLET ORAL ONCE
Status: COMPLETED | OUTPATIENT
Start: 2021-06-18 | End: 2021-06-18

## 2021-06-18 RX ORDER — DIPHENHYDRAMINE HYDROCHLORIDE 50 MG/ML
12.5 INJECTION, SOLUTION INTRAMUSCULAR; INTRAVENOUS ONCE
Status: COMPLETED | OUTPATIENT
Start: 2021-06-18 | End: 2021-06-18

## 2021-06-18 RX ADMIN — DIPHENHYDRAMINE HYDROCHLORIDE 12.5 MG: 50 INJECTION INTRAMUSCULAR; INTRAVENOUS at 11:00

## 2021-06-18 RX ADMIN — ACETAMINOPHEN 650 MG: 325 TABLET ORAL at 10:47

## 2021-06-18 RX ADMIN — IRON SUCROSE 200 MG: 20 INJECTION, SOLUTION INTRAVENOUS at 11:56

## 2021-06-18 NOTE — PROGRESS NOTES
Outpatient Infusion Center Short Visit Progress Note    2042 Patient admitted to [facility] for Venofer ambulatory in stable condition. Assessment completed. No new concerns voiced. Covid Screening      1. Do you have any symptoms of COVID-19? SOB, coughing, fever, or generally not feeling well ? NO  2. Have you been exposed to COVID-19 recently? NO  3. Have you had any recent contact with family/friend that has a pending COVID test? NO    Vital Signs:  Visit Vitals  BP (!) 112/59   Pulse 89   Temp 96.8 °F (36 °C)   Resp 16   SpO2 100%   Breastfeeding No         24 G PIV in left AC with positive blood return. Lab Results:  N/A        Medications:  Medications Administered     acetaminophen (TYLENOL) tablet 650 mg     Admin Date  06/18/2021 Action  Given Dose  650 mg Route  Oral Administered By  Travis Holland RN          diphenhydrAMINE (BENADRYL) injection 12.5 mg     Admin Date  06/18/2021 Action  Given Dose  12.5 mg Route  IntraVENous Administered By  Que Worthy RN          iron sucrose (VENOFER) 200 mg in 0.9% sodium chloride 100 mL, overfill volume 10 mL IVPB     Admin Date  06/18/2021 Action  New Bag Dose  200 mg Rate  120 mL/hr Route  IntraVENous Administered By  Que Worthy, SHERMAN                 Patient tolerated treatment well and stayed 30 min for post infusion observation. Patient discharged from [place] [address] ambulatory in no distress at 1325. Patient aware of next appointment.     Future Appointments   Date Time Provider Samira Duggan   6/22/2021  2:30 PM [facility]   7/2/2021 11:30 AM [facility]   7/6/2021 11:30 AM SS INF2 CH4 <2H [facility] [place]   7/9/2021  3:00 PM [facility] [place]   12/3/2021 10:00 AM Anna Mccurdy MD [facility] AMB

## 2021-06-22 ENCOUNTER — HOSPITAL ENCOUNTER (OUTPATIENT)
Dept: INFUSION THERAPY | Age: 26
Discharge: HOME | End: 2021-06-22
Payer: COMMERCIAL

## 2021-06-22 VITALS
BODY MASS INDEX: 24.45 KG/M2 | RESPIRATION RATE: 16 BRPM | HEART RATE: 84 BPM | OXYGEN SATURATION: 99 % | TEMPERATURE: 97 F | WEIGHT: 133.7 LBS | DIASTOLIC BLOOD PRESSURE: 55 MMHG | SYSTOLIC BLOOD PRESSURE: 110 MMHG

## 2021-06-22 DIAGNOSIS — D50.0 IRON DEFICIENCY ANEMIA DUE TO CHRONIC BLOOD LOSS: Primary | ICD-10-CM

## 2021-06-22 PROCEDURE — 74011000258 HC RX REV CODE- 258: Performed by: NURSE PRACTITIONER

## 2021-06-22 PROCEDURE — 96365 THER/PROPH/DIAG IV INF INIT: CPT

## 2021-06-22 PROCEDURE — 74011000258 HC RX REV CODE- 258: Performed by: INTERNAL MEDICINE

## 2021-06-22 PROCEDURE — 96374 THER/PROPH/DIAG INJ IV PUSH: CPT

## 2021-06-22 PROCEDURE — 74011250636 HC RX REV CODE- 250/636: Performed by: INTERNAL MEDICINE

## 2021-06-22 PROCEDURE — 74011250636 HC RX REV CODE- 250/636: Performed by: NURSE PRACTITIONER

## 2021-06-22 PROCEDURE — 74011250637 HC RX REV CODE- 250/637: Performed by: NURSE PRACTITIONER

## 2021-06-22 RX ORDER — DIPHENHYDRAMINE HYDROCHLORIDE 50 MG/ML
12.5 INJECTION, SOLUTION INTRAMUSCULAR; INTRAVENOUS ONCE
Status: COMPLETED | OUTPATIENT
Start: 2021-06-22 | End: 2021-06-22

## 2021-06-22 RX ORDER — SODIUM CHLORIDE 9 MG/ML
10 INJECTION INTRAMUSCULAR; INTRAVENOUS; SUBCUTANEOUS AS NEEDED
Status: DISCONTINUED | OUTPATIENT
Start: 2021-06-22 | End: 2021-06-23 | Stop reason: HOSPADM

## 2021-06-22 RX ORDER — HEPARIN 100 UNIT/ML
300-500 SYRINGE INTRAVENOUS AS NEEDED
Status: DISCONTINUED | OUTPATIENT
Start: 2021-06-22 | End: 2021-06-23 | Stop reason: HOSPADM

## 2021-06-22 RX ORDER — ACETAMINOPHEN 325 MG/1
650 TABLET ORAL ONCE
Status: COMPLETED | OUTPATIENT
Start: 2021-06-22 | End: 2021-06-22

## 2021-06-22 RX ORDER — SODIUM CHLORIDE 0.9 % (FLUSH) 0.9 %
10 SYRINGE (ML) INJECTION AS NEEDED
Status: DISCONTINUED | OUTPATIENT
Start: 2021-06-22 | End: 2021-06-23 | Stop reason: HOSPADM

## 2021-06-22 RX ADMIN — SODIUM CHLORIDE 10 ML: 9 INJECTION, SOLUTION INTRAVENOUS at 14:59

## 2021-06-22 RX ADMIN — ACETAMINOPHEN 650 MG: 325 TABLET ORAL at 14:59

## 2021-06-22 RX ADMIN — IRON SUCROSE 200 MG: 20 INJECTION, SOLUTION INTRAVENOUS at 15:31

## 2021-06-22 RX ADMIN — DIPHENHYDRAMINE HYDROCHLORIDE 12.5 MG: 50 INJECTION INTRAMUSCULAR; INTRAVENOUS at 14:59

## 2021-06-22 NOTE — PROGRESS NOTES
OUTPATIENT INFUSION CENTER    DISCHARGE INSTRUCTIONS FOR:    IRON INFUSIONS - INCLUDING VENOFER, FERRLECIT, AND INFED    You should continue to take your usual home medications unless otherwise instructed by your physician. Drink plenty of fluids and eat your usual diet. All medications have the potential to cause side effects. Your physician can instruct you regarding any necessary treatment for side effects. Some possible side effects of iron infusions may include the following:     - Urinary changes;   - Mild muscle cramping;   - Mild nausea, stomach pain, diarrhea or constipation;   - Mild skin itching, mild pain at IV site. Signs/Symptoms of an allergic reaction may require immediate medical attention. These may include one or more of the following:       Skin redness, itching, swelling, blistering, weeping, crusting, rash or hives. Wheezing, chest tightness, cough, or shortness of breath;   Swelling of the face, eyelids, lips, tongue, or throat;  Severe headache, seizures or tremor;  Stuffy nose, runny nose, sneezing;   Red (bloodshot), itchy, swollen, or watery eyes;  Stomach  pain, nausea, vomiting, diarrhea or bloody diarrhea; Chest pain or tightness, increased heart rate, palpitations, changes in blood pressure which can cause dizziness, unusual feelings of weakness or fatigue;  Back ache or pain around waist;  Painful urination, increase or decrease in amount of urine, blood in urine. Contact your physicians office with any questions or concerns regarding your treatment.     Musa Joshua, Signature: _____________________________________ 6/22/2021  Sarah Varghese RN

## 2021-06-22 NOTE — PROGRESS NOTES
Outpatient Infusion Center Short Visit Progress Note    1430Patient admitted to [facility] for venofer ambulatory in stable condition. Assessment completed. No new concerns voiced. Covid Screening      1. Do you have any symptoms of COVID-19? SOB, coughing, fever, or generally not feeling well ? no  2. Have you been exposed to COVID-19 recently? no  3. Have you had any recent contact with family/friend that has a pending COVID test? no    Vital Signs:  Visit Vitals  BP (!) 112/56   Pulse 91   Temp 97 °F (36.1 °C)   Resp 18   Wt 60.6 kg (133 lb 11.2 oz)   SpO2 99%   BMI 24.45 kg/m²         PIV with positive blood return. Iron discharge instructions reviewed    Medications:  Medications Administered     0.9% sodium chloride injection 10 mL     Admin Date  06/22/2021 Action  Given Dose  10 mL Route  IntraVENous Administered By  Tj Aguayo RN          acetaminophen (TYLENOL) tablet 650 mg     Admin Date  06/22/2021 Action  Given Dose  650 mg Route  Oral Administered By  Tj Aguayo RN          diphenhydrAMINE (BENADRYL) injection 12.5 mg     Admin Date  06/22/2021 Action  Given Dose  12.5 mg Route  IntraVENous Administered By  Tj Aguayo RN          iron sucrose (VENOFER) 200 mg in 0.9% sodium chloride 100 mL, overfill volume 10 mL IVPB     Admin Date  06/22/2021 Action  New Bag Dose  200 mg Rate  120 mL/hr Route  IntraVENous Administered By  Tj Aguayo RN            Pt declined to stay for 30 minutes post tx.    1700 Patient tolerated treatment well. Patient discharged from [place] [address] ambulatory in no distress at 1700. Patient aware of next appointment.     Future Appointments   Date Time Provider Samira Duggan   7/2/2021 11:30 AM [facility]   7/6/2021 11:30 AM SS INF2 CH4 <2H [facility] . [place]   7/9/2021  3:00 [facility]   12/3/2021 10:00 AM Grecia Mccurdy MD [facility] BS AMB

## 2021-07-01 NOTE — ADDENDUM NOTE
Encounter addended by: Sheyla Holden RN on: 7/1/2021 10:18 AM   Actions taken: Charge Capture section accepted

## 2021-07-02 ENCOUNTER — HOSPITAL ENCOUNTER (OUTPATIENT)
Dept: INFUSION THERAPY | Age: 26
Discharge: HOME | End: 2021-07-02
Payer: COMMERCIAL

## 2021-07-02 VITALS
TEMPERATURE: 98.6 F | OXYGEN SATURATION: 98 % | WEIGHT: 133.8 LBS | SYSTOLIC BLOOD PRESSURE: 101 MMHG | DIASTOLIC BLOOD PRESSURE: 50 MMHG | BODY MASS INDEX: 24.47 KG/M2 | HEART RATE: 95 BPM | RESPIRATION RATE: 16 BRPM

## 2021-07-02 DIAGNOSIS — D50.0 IRON DEFICIENCY ANEMIA DUE TO CHRONIC BLOOD LOSS: Primary | ICD-10-CM

## 2021-07-02 PROCEDURE — 74011250636 HC RX REV CODE- 250/636: Performed by: NURSE PRACTITIONER

## 2021-07-02 PROCEDURE — 74011000258 HC RX REV CODE- 258: Performed by: NURSE PRACTITIONER

## 2021-07-02 PROCEDURE — 96375 TX/PRO/DX INJ NEW DRUG ADDON: CPT

## 2021-07-02 PROCEDURE — 96365 THER/PROPH/DIAG IV INF INIT: CPT

## 2021-07-02 PROCEDURE — 74011250637 HC RX REV CODE- 250/637: Performed by: NURSE PRACTITIONER

## 2021-07-02 RX ORDER — DIPHENHYDRAMINE HYDROCHLORIDE 50 MG/ML
12.5 INJECTION, SOLUTION INTRAMUSCULAR; INTRAVENOUS ONCE
Status: COMPLETED | OUTPATIENT
Start: 2021-07-02 | End: 2021-07-02

## 2021-07-02 RX ORDER — SODIUM CHLORIDE 0.9 % (FLUSH) 0.9 %
10 SYRINGE (ML) INJECTION AS NEEDED
Status: DISPENSED | OUTPATIENT
Start: 2021-07-02 | End: 2021-07-02

## 2021-07-02 RX ORDER — ACETAMINOPHEN 325 MG/1
650 TABLET ORAL ONCE
Status: COMPLETED | OUTPATIENT
Start: 2021-07-02 | End: 2021-07-02

## 2021-07-02 RX ORDER — HEPARIN 100 UNIT/ML
300-500 SYRINGE INTRAVENOUS AS NEEDED
Status: ACTIVE | OUTPATIENT
Start: 2021-07-02 | End: 2021-07-02

## 2021-07-02 RX ORDER — SODIUM CHLORIDE 9 MG/ML
10 INJECTION INTRAMUSCULAR; INTRAVENOUS; SUBCUTANEOUS AS NEEDED
Status: ACTIVE | OUTPATIENT
Start: 2021-07-02 | End: 2021-07-02

## 2021-07-02 RX ADMIN — IRON SUCROSE 200 MG: 20 INJECTION, SOLUTION INTRAVENOUS at 12:28

## 2021-07-02 RX ADMIN — ACETAMINOPHEN 650 MG: 325 TABLET ORAL at 11:48

## 2021-07-02 RX ADMIN — DIPHENHYDRAMINE HYDROCHLORIDE 12.5 MG: 50 INJECTION INTRAMUSCULAR; INTRAVENOUS at 11:48

## 2021-07-02 RX ADMIN — SODIUM CHLORIDE 10 ML: 9 INJECTION INTRAMUSCULAR; INTRAVENOUS; SUBCUTANEOUS at 13:45

## 2021-07-02 NOTE — PROGRESS NOTES
[facility] Progress Note    Date: 2021    Name: Lacey Resendiz    MRN: [id number]         : 1995    Ms. Steph Fung arrived ambulatory and in no distress for Venofer Infusion. Assessment was completed, no acute issues at this time, no new complaints voiced. 24 G PIV established to left arm, + blood return. Ms. Arun Otero vitals were reviewed. Patient Vitals for the past 24 hrs:   Temp Pulse Resp BP SpO2   21 1335 98.6 °F (37 °C) 95 16 (!) 101/50 --   21 1136 98.3 °F (36.8 °C) 95 16 (!) 112/59 98 %         Medications:  Medications Administered     0.9% sodium chloride injection 10 mL     Admin Date  2021 Action  Given Dose  10 mL Route  IntraVENous Administered By  Mary Jane Robledo RN          acetaminophen (TYLENOL) tablet 650 mg     Admin Date  2021 Action  Given Dose  650 mg Route  Oral Administered By  Mary Jane Robledo RN          diphenhydrAMINE (BENADRYL) injection 12.5 mg     Admin Date  2021 Action  Given Dose  12.5 mg Route  IntraVENous Administered By  Mary Jane Robledo RN          iron sucrose (VENOFER) 200 mg in 0.9% sodium chloride 100 mL, overfill volume 10 mL IVPB     Admin Date  2021 Action  New Bag Dose  200 mg Rate  120 mL/hr Route  IntraVENous Administered By  Mary Jane Robledo RN                Ms. Steph Fung tolerated treatment well and was discharged from [place] [address] in stable condition at [phone]. PIV flushed & removed. She is to return on  at 1130 for her next appointment.     Beth Laam RN  2021

## 2021-07-06 ENCOUNTER — HOSPITAL ENCOUNTER (OUTPATIENT)
Dept: INFUSION THERAPY | Age: 26
Discharge: HOME | End: 2021-07-06
Payer: COMMERCIAL

## 2021-07-06 VITALS
HEART RATE: 86 BPM | RESPIRATION RATE: 18 BRPM | TEMPERATURE: 97 F | SYSTOLIC BLOOD PRESSURE: 118 MMHG | OXYGEN SATURATION: 98 % | DIASTOLIC BLOOD PRESSURE: 57 MMHG

## 2021-07-06 DIAGNOSIS — D50.0 IRON DEFICIENCY ANEMIA DUE TO CHRONIC BLOOD LOSS: Primary | ICD-10-CM

## 2021-07-06 PROCEDURE — 96365 THER/PROPH/DIAG IV INF INIT: CPT

## 2021-07-06 PROCEDURE — 74011250636 HC RX REV CODE- 250/636: Performed by: NURSE PRACTITIONER

## 2021-07-06 PROCEDURE — 74011000258 HC RX REV CODE- 258: Performed by: NURSE PRACTITIONER

## 2021-07-06 RX ORDER — SODIUM CHLORIDE 9 MG/ML
10 INJECTION INTRAMUSCULAR; INTRAVENOUS; SUBCUTANEOUS AS NEEDED
Status: ACTIVE | OUTPATIENT
Start: 2021-07-06 | End: 2021-07-06

## 2021-07-06 RX ORDER — DIPHENHYDRAMINE HYDROCHLORIDE 50 MG/ML
12.5 INJECTION, SOLUTION INTRAMUSCULAR; INTRAVENOUS ONCE
Status: ACTIVE | OUTPATIENT
Start: 2021-07-06 | End: 2021-07-06

## 2021-07-06 RX ORDER — ACETAMINOPHEN 325 MG/1
650 TABLET ORAL ONCE
Status: ACTIVE | OUTPATIENT
Start: 2021-07-06 | End: 2021-07-06

## 2021-07-06 RX ORDER — HEPARIN 100 UNIT/ML
300-500 SYRINGE INTRAVENOUS AS NEEDED
Status: ACTIVE | OUTPATIENT
Start: 2021-07-06 | End: 2021-07-06

## 2021-07-06 RX ORDER — SODIUM CHLORIDE 0.9 % (FLUSH) 0.9 %
10 SYRINGE (ML) INJECTION AS NEEDED
Status: DISPENSED | OUTPATIENT
Start: 2021-07-06 | End: 2021-07-06

## 2021-07-06 RX ADMIN — IRON SUCROSE 200 MG: 20 INJECTION, SOLUTION INTRAVENOUS at 12:17

## 2021-07-06 NOTE — PROGRESS NOTES
[facility] Progress Note    Date: 2021    Name: Louann Pierre    MRN: [id number]         : 1995    Ms. Palmira Burnett Arrived ambulatory and in no distress for Venofer Infusion. Assessment was completed, patient complaining of ongoing fatigue. 24 G PIV established to left arm, + blood return. Patient states she has never had issues with her Venofer infusion, confirmed with MD's office she does NOT need to take Tylenol or Benadryl as a premedication to infusion. Ms. Zohreh Hoover vitals were reviewed. Visit Vitals  /71   Pulse 93   Temp 98.9 °F (37.2 °C)   Resp 18   SpO2 98%     Medications:  Medications Administered     iron sucrose (VENOFER) 200 mg in 0.9% sodium chloride 100 mL, overfill volume 10 mL IVPB     Admin Date  2021 Action  New Bag Dose  200 mg Rate  120 mL/hr Route  IntraVENous Administered By  Kari Berrios, SHERMAN              Ms. Palmira Burnett tolerated treatment well and was discharged from [place] [address] in stable condition. PIV flushed & removed. She is aware of future appointments.     Future Appointments   Date Time Provider Samira Duggan   2021  3:00 PM [facility]   12/3/2021 10:00 AM Lisette Mccurdy MD [facility] BS LITZY Turpin RN  2021

## 2021-07-09 ENCOUNTER — HOSPITAL ENCOUNTER (OUTPATIENT)
Dept: INFUSION THERAPY | Age: 26
Discharge: HOME | End: 2021-07-09
Payer: COMMERCIAL

## 2021-07-09 VITALS
SYSTOLIC BLOOD PRESSURE: 114 MMHG | BODY MASS INDEX: 24.29 KG/M2 | OXYGEN SATURATION: 100 % | TEMPERATURE: 97.8 F | WEIGHT: 132.78 LBS | DIASTOLIC BLOOD PRESSURE: 63 MMHG | HEART RATE: 87 BPM | RESPIRATION RATE: 16 BRPM

## 2021-07-09 DIAGNOSIS — D50.0 IRON DEFICIENCY ANEMIA DUE TO CHRONIC BLOOD LOSS: Primary | ICD-10-CM

## 2021-07-09 PROCEDURE — 74011000258 HC RX REV CODE- 258: Performed by: NURSE PRACTITIONER

## 2021-07-09 PROCEDURE — 74011250636 HC RX REV CODE- 250/636: Performed by: NURSE PRACTITIONER

## 2021-07-09 PROCEDURE — 96365 THER/PROPH/DIAG IV INF INIT: CPT

## 2021-07-09 RX ORDER — ACETAMINOPHEN 325 MG/1
650 TABLET ORAL ONCE
Status: DISCONTINUED | OUTPATIENT
Start: 2021-07-09 | End: 2021-07-09

## 2021-07-09 RX ORDER — DIPHENHYDRAMINE HYDROCHLORIDE 50 MG/ML
12.5 INJECTION, SOLUTION INTRAMUSCULAR; INTRAVENOUS ONCE
Status: DISCONTINUED | OUTPATIENT
Start: 2021-07-09 | End: 2021-07-09

## 2021-07-09 RX ORDER — SODIUM CHLORIDE 9 MG/ML
10 INJECTION INTRAMUSCULAR; INTRAVENOUS; SUBCUTANEOUS AS NEEDED
Status: DISCONTINUED | OUTPATIENT
Start: 2021-07-09 | End: 2021-07-10 | Stop reason: HOSPADM

## 2021-07-09 RX ORDER — SODIUM CHLORIDE 0.9 % (FLUSH) 0.9 %
10 SYRINGE (ML) INJECTION AS NEEDED
Status: DISCONTINUED | OUTPATIENT
Start: 2021-07-09 | End: 2021-07-10 | Stop reason: HOSPADM

## 2021-07-09 RX ORDER — HEPARIN 100 UNIT/ML
300-500 SYRINGE INTRAVENOUS AS NEEDED
Status: DISCONTINUED | OUTPATIENT
Start: 2021-07-09 | End: 2021-07-10 | Stop reason: HOSPADM

## 2021-07-09 RX ADMIN — SODIUM CHLORIDE 10 ML: 9 INJECTION INTRAMUSCULAR; INTRAVENOUS; SUBCUTANEOUS at 16:42

## 2021-07-09 RX ADMIN — IRON SUCROSE 200 MG: 20 INJECTION, SOLUTION INTRAVENOUS at 15:32

## 2021-07-09 NOTE — PROGRESS NOTES
[facility] Progress Note    Date: 2021    Name: Soren Iqbal    MRN: [id number]         : 1995    Ms. Raad Sanchez arrived ambulatory and in no distress for C1D29 of Venofer. Assessment was completed, no acute issues at this time, no new complaints voiced. 24 G PIV established to left arm without difficulty. No labs drawn today. Ms. Haily Alvares vitals were reviewed. Patient Vitals for the past 24 hrs:   Temp Pulse Resp BP SpO2   21 1634 97.8 °F (36.6 °C) 87 16 114/63 --   21 1514 97.4 °F (36.3 °C) 93 20 129/60 100 %         Medications:  Medications Administered     0.9% sodium chloride injection 10 mL     Admin Date  2021 Action  Given Dose  10 mL Route  IntraVENous Administered By  Billy Farris, RN          iron sucrose (VENOFER) 200 mg in 0.9% sodium chloride 100 mL, overfill volume 10 mL IVPB     Admin Date  2021 Action  New Bag Dose  200 mg Rate  120 mL/hr Route  IntraVENous Administered By  Billy Farris, SHERMAN                  Ms. Raad Sanchez tolerated treatment well and was discharged from [place] [address] in stable condition . PIV flushed & removed. Discharge instructions reviewed with patient, verbalized understanding. Patient politely declined to stay 30 minutes post infusion for monitoring. Patient has completed her therapy and has no return visits planned.     Scooby Field RN  2021

## 2021-08-17 ENCOUNTER — VIRTUAL VISIT (OUTPATIENT)
Dept: FAMILY MEDICINE CLINIC | Age: 26
End: 2021-08-17
Payer: COMMERCIAL

## 2021-08-17 ENCOUNTER — DOCUMENTATION ONLY (OUTPATIENT)
Dept: FAMILY MEDICINE CLINIC | Age: 26
End: 2021-08-17

## 2021-08-17 DIAGNOSIS — F41.1 GAD (GENERALIZED ANXIETY DISORDER): Primary | ICD-10-CM

## 2021-08-17 PROCEDURE — 99213 OFFICE O/P EST LOW 20 MIN: CPT | Performed by: NURSE PRACTITIONER

## 2021-08-17 RX ORDER — VENLAFAXINE HYDROCHLORIDE 75 MG/1
75 CAPSULE, EXTENDED RELEASE ORAL DAILY
Qty: 7 CAPSULE | Refills: 0 | Status: SHIPPED | OUTPATIENT
Start: 2021-08-17 | End: 2021-10-12

## 2021-08-17 RX ORDER — VENLAFAXINE HYDROCHLORIDE 150 MG/1
150 CAPSULE, EXTENDED RELEASE ORAL DAILY
Qty: 30 CAPSULE | Refills: 2 | Status: SHIPPED | OUTPATIENT
Start: 2021-08-17

## 2021-08-17 NOTE — PROGRESS NOTES
Chief Complaint   Patient presents with    Anxiety    Depression    Medication Evaluation    Form Completion     Patient in vv appt today for worsening of anxiety and depression. Pt states sx started to worsen in the past 2-3wks. Pt denies changes in home or work like. Pt stopped effexor due to medication stopped helping with sx. Pt would like to discuss medical leave from work due to sx. 1. Have you been to the ER, urgent care clinic since your last visit? Hospitalized since your last visit? No    2. Have you seen or consulted any other health care providers outside of the [address] [place] [address] since your last visit? Include any pap smears or colon screening.  No

## 2021-08-17 NOTE — PROGRESS NOTES
El Anthony is a 32 y.o. female who was seen by synchronous (real-time) audio-video technology on 8/17/2021 for Anxiety, Depression, Medication Evaluation, and Form Completion        Assessment & Plan:   Diagnoses and all orders for this visit:    1. MEDHAT (generalized anxiety disorder)  -     venlafaxine-SR (EFFEXOR-XR) 150 mg capsule; Take 1 Capsule by mouth daily. Maintenance dose. -     venlafaxine-SR (EFFEXOR-XR) 75 mg capsule; Take 1 Capsule by mouth daily. Starting dose days 1-7. Resume rx. Reviewed SEs/ADRs of medication. Est care with therapist for additional assistance. RTW on 9/6 without restrictions. Enc pt to follow up prior to RTW. I spent at least 15 minutes on this visit with this established patient. 712  Subjective:     Chief Complaint   Patient presents with    Anxiety    Depression    Medication Evaluation    Form Completion     Patient in vv appt today for worsening of anxiety and depression. Pt states sx started to worsen in the past 2-3 wks. Pt denies changes in home or work like. Pt stopped effexor due to medication stopped helping with sx. Has been off of medication for some time. \"I just stopped taking it. \" Was doing well without it until recently. Pt would like to discuss medical leave from work due to sx. Reports anhedonia and avolution. Reports having a break down every other day. Has had a number of different things happen the last few months but feels like there is no support. Currently works at [employer] Work schedule is M-F. Pt has worked there for approx 1.5 years. Pt would like to take off to focus on her mental health. Has name and numer of therapist she previously saw that helped her. Karma Ronquillo. Would like to resume taking effexor daily. Recalls medication being helpful in the past for sx. Will take pt out on leave to resume daily medication and est care with therapist. RTW on 9/6. Denies any other concerns at this time.      Prior to Admission medications    Medication Sig Start Date End Date Taking? Authorizing Provider   metoprolol succinate (TOPROL-XL) 25 mg XL tablet Take 0.5 Tabs by mouth daily. Patient not taking: Reported on 8/17/2021 10/29/20 8/17/21  Enedelia Sunshine MD   venlafaxine-SR [facility] 150 mg capsule take 1 capsule by mouth daily (MAINTENANCE DOSE)  Patient not taking: Reported on 8/17/2021 10/2/20 8/17/21  Josue Chau NP   ergocalciferol (ERGOCALCIFEROL) 1,250 mcg (50,000 unit) capsule Take 1 Cap by mouth every seven (7) days. Patient not taking: Reported on 8/17/2021 1/31/20 8/17/21  Joshua Luna NP     Patient Active Problem List    Diagnosis Date Noted    MEDHAT (generalized anxiety disorder) 12/18/2018    S/P MVR (mitral valve repair) 12/13/2013    Mitral regurgitation 12/02/2013    Anemia, iron deficiency 01/16/2012    Scoliosis 12/07/2010       Allergies   Allergen Reactions    Amoxicillin Rash     Skin peeling       ROS    Objective:   No flowsheet data found. General: alert, cooperative, no distress   Mental  status: Anxious, nervous, reports having break downs in previous stressful situations including crying spells and panic attacks   HENT: NCAT   Neck: no visualized mass   Resp: no respiratory distress                 Additional exam findings: We discussed the expected course, resolution and complications of the diagnosis(es) in detail. Medication risks, benefits, costs, interactions, and alternatives were discussed as indicated. I advised her to contact the office if her condition worsens, changes or fails to improve as anticipated. She expressed understanding with the diagnosis(es) and plan. Mukund Pal, was evaluated through a synchronous (real-time) audio-video encounter. The patient (or guardian if applicable) is aware that this is a billable service. Verbal consent to proceed has been obtained within the past 12 months.  The visit was conducted pursuant to the emergency declaration under the [facility], [address] [place] [address] waiver authority and the [employer] and [employer] General Act. Patient identification was verified, and a caregiver was present when appropriate. The patient was located in a state where the provider was credentialed to provide care.     Darrin Kendall NP

## 2021-08-24 ENCOUNTER — DOCUMENTATION ONLY (OUTPATIENT)
Dept: FAMILY MEDICINE CLINIC | Age: 26
End: 2021-08-24

## 2021-08-26 ENCOUNTER — DOCUMENTATION ONLY (OUTPATIENT)
Dept: FAMILY MEDICINE CLINIC | Age: 26
End: 2021-08-26

## 2021-08-27 ENCOUNTER — DOCUMENTATION ONLY (OUTPATIENT)
Dept: FAMILY MEDICINE CLINIC | Age: 26
End: 2021-08-27

## 2021-08-27 NOTE — PROGRESS NOTES
Faxed FMLA form to [employer] @ [phone]. Confirmation number [id number]. Original form placed in scan folder for central scanning.

## 2021-10-12 ENCOUNTER — HOSPITAL ENCOUNTER (EMERGENCY)
Age: 26
Discharge: HOME | End: 2021-10-12
Attending: EMERGENCY MEDICINE
Payer: COMMERCIAL

## 2021-10-12 VITALS
TEMPERATURE: 98.6 F | HEIGHT: 61 IN | RESPIRATION RATE: 16 BRPM | HEART RATE: 104 BPM | OXYGEN SATURATION: 100 % | WEIGHT: 126 LBS | DIASTOLIC BLOOD PRESSURE: 67 MMHG | BODY MASS INDEX: 23.79 KG/M2 | SYSTOLIC BLOOD PRESSURE: 111 MMHG

## 2021-10-12 DIAGNOSIS — J06.9 ACUTE URI: Primary | ICD-10-CM

## 2021-10-12 PROCEDURE — 99281 EMR DPT VST MAYX REQ PHY/QHP: CPT

## 2021-10-12 NOTE — ED PROVIDER NOTES
EMERGENCY DEPARTMENT HISTORY AND PHYSICAL EXAM      Date: 10/12/2021  Patient Name: Kelli Palomo    History of Presenting Illness     Chief Complaint   Patient presents with    Nasal Congestion    Sore Throat       History Provided By: Patient    HPI: Kelli Palomo, 32 y.o. female with a past medical history significant mitral valve repair presents to the ED with cc of nasal congestion, sore throat for 1 day. She is vaccinated for Covid 19, nonsmoker, no known ill exposures. Denies chest pain, shortness of breath, nausea, vomiting. PCP: Magda Pineda NP    No current facility-administered medications on file prior to encounter. Current Outpatient Medications on File Prior to Encounter   Medication Sig Dispense Refill    venlafaxine-SR (EFFEXOR-XR) 150 mg capsule Take 1 Capsule by mouth daily. Maintenance dose. 30 Capsule 2    [DISCONTINUED] venlafaxine-SR (EFFEXOR-XR) 75 mg capsule Take 1 Capsule by mouth daily. Starting dose days 1-7. 7 Capsule 0       Past History     Past Medical History:  Past Medical History:   Diagnosis Date    Anemia     Murmur     Valvular heart disease        Past Surgical History:  Past Surgical History:   Procedure Laterality Date    MO CARDIAC SURG PROCEDURE UNLIST         Family History:  Family History   Problem Relation Age of Onset    Cancer Father         LYMPHOMA    Heart Disease Father     Other Mother     Arthritis-osteo Mother     Kidney Disease Mother         ANKYLOSING SPONDYLITIS    Diabetes Maternal Grandmother     Hypertension Maternal Grandmother     Kidney Disease Maternal Grandmother     Cancer Maternal Aunt         LUNG CA - SMOKER    Other Maternal Aunt         SARCOIDOSIS    Anesth Problems Neg Hx        Social History:  Social History     Tobacco Use    Smoking status: Never Smoker    Smokeless tobacco: Never Used   Substance Use Topics    Alcohol use: No     Comment: rarely    Drug use: No       Allergies:   Allergies Allergen Reactions    Amoxicillin Rash     Skin peeling         Review of Systems   Review of Systems   Constitutional: Negative for fever. HENT: Positive for sore throat. Respiratory: Negative for shortness of breath. Cardiovascular: Negative for chest pain. Gastrointestinal: Negative for abdominal pain. Musculoskeletal: Negative for back pain. All other systems reviewed and are negative. Physical Exam   Physical Exam  Vitals and nursing note reviewed. Constitutional:       Appearance: Normal appearance. She is not ill-appearing. HENT:      Head: Normocephalic and atraumatic. Right Ear: Tympanic membrane normal.      Left Ear: Tympanic membrane normal.      Nose: Nose normal.      Mouth/Throat:      Mouth: Mucous membranes are moist.      Pharynx: Posterior oropharyngeal erythema present. No oropharyngeal exudate. Comments: Mild pharyngeal erythema and postnasal drip  Eyes:      General: No scleral icterus. Extraocular Movements: Extraocular movements intact. Pupils: Pupils are equal, round, and reactive to light. Cardiovascular:      Rate and Rhythm: Normal rate and regular rhythm. Pulses: Normal pulses. Heart sounds: Normal heart sounds. Pulmonary:      Effort: Pulmonary effort is normal.      Breath sounds: Normal breath sounds. No wheezing, rhonchi or rales. Abdominal:      General: There is no distension. Musculoskeletal:         General: Normal range of motion. Cervical back: Normal range of motion and neck supple. Right lower leg: No edema. Left lower leg: No edema. Skin:     General: Skin is warm and dry. Findings: No rash. Neurological:      General: No focal deficit present. Mental Status: She is alert and oriented to person, place, and time.       Comments: Nl gross motor/sensory exam without any focal or lateralizing deficits   Psychiatric:         Mood and Affect: Mood normal.         Behavior: Behavior normal. Diagnostic Study Results     Labs -   No results found for this or any previous visit (from the past 12 hour(s)). Radiologic Studies -   No orders to display     CT Results  (Last 48 hours)    None        CXR Results  (Last 48 hours)    None            Medical Decision Making   I am the first provider for this patient. I reviewed the vital signs, available nursing notes, past medical history, past surgical history, family history and social history. Vital Signs-Reviewed the patient's vital signs. Patient Vitals for the past 12 hrs:   Temp Pulse Resp BP SpO2   10/12/21 1451 98.6 °F (37 °C) (!) 104 16 111/67 100 %       Records Reviewed: Nursing Notes    Provider Notes (Medical Decision Making): Impression: URI    ED Course:   Initial assessment performed. The patients presenting problems have been discussed, and they are in agreement with the care plan formulated and outlined with them. I have encouraged them to ask questions as they arise throughout their visit. With benign exam, pulse ox 100%. discussed symptomatic care, signs and symptoms for follow-up, follow-up with primary care  PLAN:  1. Current Discharge Medication List        2. Follow-up Information     Follow up With Specialties Details Why Contact Dania Carroll NP Nurse Practitioner   [address]  [address] [facility]  [place] [address] [phone]          Return to ED if worse     Diagnosis     Clinical Impression:   1.  Acute URI

## 2021-10-12 NOTE — DISCHARGE INSTRUCTIONS
Mucinex, Vicks VapoRub as needed for congestion. Drink plenty of liquids, return for worsening symptoms.

## 2021-10-12 NOTE — LETTER
[address] [facility] EMERGENCY DEPARTMENT  [address] [facility] [address]  [phone]    Work/School Note    Date: 10/12/2021    To Whom It May concern:    Sabrina March was seen and treated today in the emergency room by the following provider(s):  Attending Provider: Maria Teresa Adames MD.      Sarbina March may return to  Work 10/15/2021    Sincerely,          Mima Da Silva RN

## 2021-11-23 ENCOUNTER — TELEPHONE (OUTPATIENT)
Dept: ONCOLOGY | Age: 26
End: 2021-11-23

## 2021-11-23 NOTE — TELEPHONE ENCOUNTER
[facility]  [phone]    11/23/21- Phone call placed to pt to remind pt to have labs drawn prior to her follow up appointment with . Pt verbalized understanding.

## 2021-12-10 LAB
BASOPHILS # BLD AUTO: 0 X10E3/UL (ref 0–0.2)
BASOPHILS NFR BLD AUTO: 1 %
EOSINOPHIL # BLD AUTO: 0.1 X10E3/UL (ref 0–0.4)
EOSINOPHIL NFR BLD AUTO: 2 %
ERYTHROCYTE [DISTWIDTH] IN BLOOD BY AUTOMATED COUNT: 13.4 % (ref 11.7–15.4)
FERRITIN SERPL-MCNC: 202 NG/ML (ref 15–150)
HBA1 GENE MUT ANL BLD/T: NORMAL
HCT VFR BLD AUTO: 41.4 % (ref 34–46.6)
HGB BLD-MCNC: 13.1 G/DL (ref 11.1–15.9)
IMM GRANULOCYTES # BLD AUTO: 0 X10E3/UL (ref 0–0.1)
IMM GRANULOCYTES NFR BLD AUTO: 0 %
IRON SATN MFR SERPL: 38 % (ref 15–55)
IRON SERPL-MCNC: 99 UG/DL (ref 27–159)
LYMPHOCYTES # BLD AUTO: 1.4 X10E3/UL (ref 0.7–3.1)
LYMPHOCYTES NFR BLD AUTO: 40 %
MCH RBC QN AUTO: 22.3 PG (ref 26.6–33)
MCHC RBC AUTO-ENTMCNC: 31.6 G/DL (ref 31.5–35.7)
MCV RBC AUTO: 70 FL (ref 79–97)
MONOCYTES # BLD AUTO: 0.3 X10E3/UL (ref 0.1–0.9)
MONOCYTES NFR BLD AUTO: 10 %
NEUTROPHILS # BLD AUTO: 1.6 X10E3/UL (ref 1.4–7)
NEUTROPHILS NFR BLD AUTO: 47 %
PLATELET # BLD AUTO: 230 X10E3/UL (ref 150–450)
RBC # BLD AUTO: 5.88 X10E6/UL (ref 3.77–5.28)
TIBC SERPL-MCNC: 258 UG/DL (ref 250–450)
UIBC SERPL-MCNC: 159 UG/DL (ref 131–425)
WBC # BLD AUTO: 3.4 X10E3/UL (ref 3.4–10.8)

## 2021-12-13 NOTE — PROGRESS NOTES
[facility] at [address] [facility]  Tarun Siskin: [phone]  F: [phone]      Reason for Visit:   Sukumar Norwood is a 32 y.o. female who is seen for follow up of anemia. History of Present Illness:   She received several doses of venofer this summer, tolerated them well. She reports improvement in her energy with the iron, though lately having some more fatigue. No dyspnea or lightheadedness. No recent pica. No bleeding, menses light. Review of systems was obtained and pertinent findings reviewed above. Past medical history, social history, family history, medications, and allergies are located in the electronic medical record. Physical Exam:     There were no vitals taken for this visit. General: alert, cooperative, no distress   Mental  status: normal mood, behavior, speech, dress, motor activity, and thought processes, able to follow commands   HENT: NCAT   Neck: no visualized mass   Resp: no respiratory distress   Neuro: no gross deficits   Skin: no discoloration or lesions of concern on visible areas   Psychiatric: normal affect, consistent with stated mood, no evidence of hallucinations       Due to this being a TeleHealth evaluation (During [address] emergency), many elements of the physical examination are unable to be assessed. Evaluation of the following organ systems was limited: Vitals/Constitutional/EENT/Resp/CV/GI//MS/Neuro/Skin/Heme-Lymph-Imm. Results:     Lab Results   Component Value Date/Time    WBC 3.4 12/01/2021 11:41 AM    HGB 13.1 12/01/2021 11:41 AM    HCT 41.4 12/01/2021 11:41 AM    PLATELET 672 67/95/1280 11:41 AM    MCV 70 (L) 12/01/2021 11:41 AM    ABS.  NEUTROPHILS 1.6 12/01/2021 11:41 AM     Lab Results   Component Value Date/Time    Sodium 137 01/30/2020 08:15 AM    Potassium 4.0 01/30/2020 08:15 AM    Chloride 106 01/30/2020 08:15 AM    CO2 25 01/30/2020 08:15 AM    Glucose 79 01/30/2020 08:15 AM    BUN 15 01/30/2020 08:15 AM    Creatinine 0.67 01/30/2020 08:15 AM    GFR est AA >60 01/30/2020 08:15 AM    GFR est non-AA >60 01/30/2020 08:15 AM    Calcium 8.6 01/30/2020 08:15 AM    Glucose (POC) 108 (H) 12/15/2013 07:26 AM     Lab Results   Component Value Date/Time    Bilirubin, total 0.3 01/30/2020 08:15 AM    ALT (SGPT) 22 01/30/2020 08:15 AM    Alk. phosphatase 72 01/30/2020 08:15 AM    Protein, total 7.4 01/30/2020 08:15 AM    Albumin 3.7 01/30/2020 08:15 AM    Globulin 3.7 01/30/2020 08:15 AM     Lab Results   Component Value Date/Time    Reticulocyte count 0.7 09/27/2016 04:29 PM    Iron % saturation 38 12/01/2021 11:41 AM    TIBC 258 12/01/2021 11:41 AM    Ferritin 202 (H) 12/01/2021 11:41 AM    Haptoglobin 99 09/27/2016 04:29 PM     09/27/2016 04:29 PM    Sed rate (ESR) 59 (H) 10/20/2011 11:40 AM    TSH 0.68 01/30/2020 08:15 AM    Lipase 153 11/11/2014 12:25 PM     Lab Results   Component Value Date/Time    INR 1.1 12/16/2013 04:00 AM    aPTT 30.3 (H) 12/16/2013 04:00 AM    D-Dimer 0.28 02/15/2017 04:31 PM    Factor VIII Activity 139 07/29/2016 12:17 PM    von Willebrand Factor (vWF) Ag 104 07/29/2016 12:17 PM    vWF Activity 71 07/29/2016 12:17 PM     Ferritin   Date Value   12/01/2021 202 ng/mL (H)   05/28/2021 29 ng/mL   05/30/2020 106 ng/mL   01/30/2020 10 NG/ML (L)   06/19/2019 86 ng/mL   12/14/2018 263 ng/mL (H)   09/19/2018 7 ng/mL (L)   02/21/2018 11 ng/mL (L)   08/09/2017 17 NG/ML   01/25/2017 79 ng/mL   09/27/2016 317 ng/mL (H)   08/22/2016 565 ng/mL (H)   07/29/2016 7 NG/ML (L)       9/27/2016:  Hemoglobin fractionation: normal    1/25/2017:  HGB: 13.4  WBC: 5.7  PLT: 260  MCV: 71  Iron saturation: 31%  Ferritin: 79    1/25/2017:  Smear: Microcytosis offset by a high normal RBC count, suggestive of minor thalassemia or innocent hemoglobinopathy.     12/1/2021:  Alpha thal:   Alpha-thalassemia minor, alpha-/alpha-   Mutation(s) identified:  alpha3.7 and alpha3.7    US pelvis, US transvaginal 7/13/2016: normal    EGD and colonoscopy on 10/22/18 with Dr. Kishan Menezes normal.     Assessment:   1) Anemia, iron deficiency  Recurrent problem. Has required IV iron in 8/2016, 10/2018, 3/2020, and 7/2021. Anemia resolves with each treatment. She has been unable to take oral iron. She has responded well to her recent IV iron infusions. HGB normal, iron studies improved. She remains at risk of recurrent anemia, given her history. I recommend monitoring every 6-12 months and repeating IV iron as needed. Insurance most recently declined injectafer, required venofer. The cause of her iron deficiency is thought to be menorrhagia. GI evaluation was unremarkable. 2) Menorrhagia  Improved, off OCPs. VWF panel was negative. 3) Alpha thalassemia minor  Confirmed on mutation testing. This is the cause of her persistent microcytosis. This condition is unlikely to have any clinical significance for her, but I do recommend folic acid PO daily. Plan:     · Start folic acid PO daily (OTC supplement)  · Labs in 12 months: CBC, iron profile, ferritin (she prefers [facility])  · Return to see me in 12 months    Dolly Bang for virtual visit    Signed By: Maribel Velázquez MD      The patient was evaluated through a synchronous (real-time) audio-video encounter. The patient (or guardian if applicable) is aware that this is a billable service. Verbal consent to proceed has been obtained within the past 12 months. The visit was conducted pursuant to the emergency declaration under the [facility], [address] [place] [address] authority and the [employer] and [employer] General Act. Patient identification was verified, and a caregiver was present when appropriate. The patient was located in a state where the provider was credentialed to provide care.

## 2021-12-14 ENCOUNTER — VIRTUAL VISIT (OUTPATIENT)
Dept: ONCOLOGY | Age: 26
End: 2021-12-14
Payer: COMMERCIAL

## 2021-12-14 DIAGNOSIS — D50.9 IRON DEFICIENCY ANEMIA, UNSPECIFIED IRON DEFICIENCY ANEMIA TYPE: Primary | ICD-10-CM

## 2021-12-14 PROCEDURE — 99214 OFFICE O/P EST MOD 30 MIN: CPT | Performed by: INTERNAL MEDICINE

## 2021-12-14 NOTE — PATIENT INSTRUCTIONS
Thank you for participating in the virtual visit with Dr. Leeann Cardenas. We will call you soon to schedule a follow up appointment with us in 12 months. If you do not hear from us, please call us at [phone] to schedule your appointment. Please use the enclosed lab slip to have your labs done before your next appointment.

## 2021-12-14 NOTE — PROGRESS NOTES
Natacha Molina is a 32 y.o. female follow up for anemia. 1. Have you been to the ER, urgent care clinic since your last visit? Hospitalized since your last visit?no    2. Have you seen or consulted any other health care providers outside of the [address] [place] [address] since your last visit? Include any pap smears or colon screening.  no

## 2022-01-13 ENCOUNTER — OFFICE VISIT (OUTPATIENT)
Dept: FAMILY MEDICINE CLINIC | Age: 27
End: 2022-01-13
Payer: COMMERCIAL

## 2022-01-13 VITALS
TEMPERATURE: 99.1 F | DIASTOLIC BLOOD PRESSURE: 63 MMHG | RESPIRATION RATE: 18 BRPM | HEIGHT: 61 IN | BODY MASS INDEX: 24.55 KG/M2 | OXYGEN SATURATION: 96 % | SYSTOLIC BLOOD PRESSURE: 106 MMHG | HEART RATE: 92 BPM | WEIGHT: 130 LBS

## 2022-01-13 DIAGNOSIS — L29.9 PRURITIC DERMATITIS: ICD-10-CM

## 2022-01-13 DIAGNOSIS — L42 PITYRIASIS ROSEA: Primary | ICD-10-CM

## 2022-01-13 PROCEDURE — 99213 OFFICE O/P EST LOW 20 MIN: CPT | Performed by: NURSE PRACTITIONER

## 2022-01-13 RX ORDER — HYDROXYZINE 25 MG/1
25 TABLET, FILM COATED ORAL
Qty: 30 TABLET | Refills: 2 | Status: SHIPPED | OUTPATIENT
Start: 2022-01-13

## 2022-01-13 RX ORDER — MOMETASONE FUROATE 1 MG/G
CREAM TOPICAL DAILY
Qty: 15 G | Refills: 1 | Status: SHIPPED | OUTPATIENT
Start: 2022-01-13

## 2022-01-13 NOTE — PATIENT INSTRUCTIONS
Pityriasis Rosea: Care Instructions  Your Care Instructions     Pityriasis rosea (say \"pit-uh-RY-uh-kushal KEVIN-zee-uh\") is a common skin rash. It usually starts as one scaly, reddish-pink spot on your stomach or back. Days or weeks later, more spots appear. The rash may itch, but it will not spread to other people. No one knows what causes pityriasis rosea. Some doctors believe it is a reaction to a virus. Pityriasis rosea is most common in children and young adults. It lasts 1 to 3 months and then goes away on its own. Medicine can help relieve any itching. Follow-up care is a key part of your treatment and safety. Be sure to make and go to all appointments, and call your doctor if you are having problems. It's also a good idea to know your test results and keep a list of the medicines you take. How can you care for yourself at home? · Use your medicine exactly as prescribed. Call your doctor if you have any problems with your medicine. · Expose your skin to small amounts of sunlight, but avoid sunburn. Sunlight can lessen the rash. · Use a mild soap, such as Dove or Cetaphil, when you wash your skin. · Add a handful of oatmeal (ground to a powder) to your bath. Or you can try an oatmeal bath product, such as Aveeno. Keep the water warm or lukewarm. A hot bath or shower may make the rash more visible and itchy. · Use an over-the-counter 1% hydrocortisone cream for small itchy areas. When should you call for help? Call your doctor now or seek immediate medical care if:    · You have signs of infection such as:  ? Pain, warmth, or swelling near the rash. ? Red streaks near the rash. ? Pus coming from the rash. ? A fever. Watch closely for changes in your health, and be sure to contact your doctor if:    · You see the rash on the palms of your hands or the soles of your feet.     · You do not get better as expected. Where can you learn more?   Go to http://www.gray.com/  Enter S327 in the search box to learn more about \"Pityriasis Rosea: Care Instructions. \"  Current as of: March 3, 2021               Content Version: 13.0  © 5174-2982 [employer]. Care instructions adapted under license by [employer] (which disclaims liability or warranty for this information). If you have questions about a medical condition or this instruction, always ask your healthcare professional. [address] any warranty or liability for your use of this information.

## 2022-01-13 NOTE — PROGRESS NOTES
Chief Complaint   Patient presents with    Skin Problem     Patient in office today for skin problem that occurred one month ago. Pt notes skin problem is dry flaky patches. Located on arms, chest and back. Itching is noted. Pt denies changes in soaps, lotions, or detergents. Pt denies new foods or environment. Have not treated with otc. No family history of psoriasis. No rash present on elbows or knees. No rash in the scalp. Rash has not really spread, just not resolving. Applying eczema lotion. Using dove soap. Applying the lotion twice a day without improvement. Denies any other concerns at this time. Chief Complaint   Patient presents with    Skin Problem     she is a 32y.o. year old female who presents for evalution. Reviewed PmHx, RxHx, FmHx, SocHx, AllgHx and updated and dated in the chart. Review of Systems - negative except as listed above in the HPI    Objective:     Vitals:    01/13/22 1609   BP: 106/63   Pulse: 92   Resp: 18   Temp: 99.1 °F (37.3 °C)   TempSrc: Oral   SpO2: 96%   Weight: 130 lb (59 kg)   Height: 5' 1\" (1.549 m)     Physical Examination: General appearance - alert, well appearing, and in no distress  Chest - clear to auscultation, no wheezes, rales or rhonchi, symmetric air entry  Heart - normal rate, regular rhythm, normal S1, S2, no murmurs  Skin - patches of hypopigmented dry appearing skin on upper back and chest that appear consistent with [place]; pt has what appears to be a herald patch anterior chest, no other skin abnormality present on exam    Assessment/ Plan:   Diagnoses and all orders for this visit:    1. Pityriasis rosea / 2. Pruritic dermatitis  -     hydrOXYzine HCL (ATARAX) 25 mg tablet; Take 1 Tablet by mouth three (3) times daily as needed for Itching.  -     mometasone (ELOCON) 0.1 % topical cream; Apply  to affected area daily. Discussed that rash appears consistent with [place]. Can take a few months to completely resolve.  Enc to take atarax as needed for itching. PRN elocon to affected areas and continue to use lotion liberally. Follow up with any new or worsening sx. I have discussed the diagnosis with the patient and the intended plan as seen in the above orders. The patient has received an after-visit summary and questions were answered concerning future plans. Medication Side Effects and Warnings were discussed with patient: yes  Patient Labs were reviewed and or requested: no  Patient Past Records were reviewed and or requested  yes  Patient / Caregiver Understanding of treatment plan was verbalized during office visit YES    Fabricio Cervantes, RICHARDSONP-C    Patient Instructions          Pityriasis Rosea: Care Instructions  Your Care Instructions     Pityriasis rosea (say \"pit-uh-RY-uh-kushal KEVIN-zee-uh\") is a common skin rash. It usually starts as one scaly, reddish-pink spot on your stomach or back. Days or weeks later, more spots appear. The rash may itch, but it will not spread to other people. No one knows what causes pityriasis rosea. Some doctors believe it is a reaction to a virus. Pityriasis rosea is most common in children and young adults. It lasts 1 to 3 months and then goes away on its own. Medicine can help relieve any itching. Follow-up care is a key part of your treatment and safety. Be sure to make and go to all appointments, and call your doctor if you are having problems. It's also a good idea to know your test results and keep a list of the medicines you take. How can you care for yourself at home? · Use your medicine exactly as prescribed. Call your doctor if you have any problems with your medicine. · Expose your skin to small amounts of sunlight, but avoid sunburn. Sunlight can lessen the rash. · Use a mild soap, such as Dove or Cetaphil, when you wash your skin. · Add a handful of oatmeal (ground to a powder) to your bath. Or you can try an oatmeal bath product, such as Aveeno. Keep the water warm or lukewarm.  A hot bath or shower may make the rash more visible and itchy. · Use an over-the-counter 1% hydrocortisone cream for small itchy areas. When should you call for help? Call your doctor now or seek immediate medical care if:    · You have signs of infection such as:  ? Pain, warmth, or swelling near the rash. ? Red streaks near the rash. ? Pus coming from the rash. ? A fever. Watch closely for changes in your health, and be sure to contact your doctor if:    · You see the rash on the palms of your hands or the soles of your feet.     · You do not get better as expected. Where can you learn more? Go to http://carmen-maximus.info/  Enter S327 in the search box to learn more about \"Pityriasis Rosea: Care Instructions. \"  Current as of: March 3, 2021               Content Version: 13.0  © 5224-6091 Healthwise, Incorporated. Care instructions adapted under license by [employer] (which disclaims liability or warranty for this information). If you have questions about a medical condition or this instruction, always ask your healthcare professional. [address] any warranty or liability for your use of this information.

## 2022-01-13 NOTE — PROGRESS NOTES
Chief Complaint   Patient presents with    Skin Problem     Patient in office today for skin problem that occurred one month ago. Pt notes skin problem is dry flaky patches. Located on arms,chest,and back. Itching is noted. Pt denies changes in soaps,lotions,or detergents. Pt denies new foods or environment. Have not treated with otc. 1. Have you been to the ER, urgent care clinic since your last visit? Hospitalized since your last visit? No    2. Have you seen or consulted any other health care providers outside of the [address] [place] [address] since your last visit? Include any pap smears or colon screening.  No

## 2022-02-06 ENCOUNTER — HOSPITAL ENCOUNTER (EMERGENCY)
Age: 27
Discharge: HOME | End: 2022-02-06
Attending: EMERGENCY MEDICINE
Payer: COMMERCIAL

## 2022-02-06 VITALS
DIASTOLIC BLOOD PRESSURE: 70 MMHG | SYSTOLIC BLOOD PRESSURE: 114 MMHG | TEMPERATURE: 100 F | BODY MASS INDEX: 24.73 KG/M2 | RESPIRATION RATE: 18 BRPM | WEIGHT: 131 LBS | OXYGEN SATURATION: 98 % | HEIGHT: 61 IN | HEART RATE: 101 BPM

## 2022-02-06 DIAGNOSIS — Z20.822 SUSPECTED COVID-19 VIRUS INFECTION: Primary | ICD-10-CM

## 2022-02-06 LAB — SARS-COV-2, COV2: NORMAL

## 2022-02-06 PROCEDURE — U0005 INFEC AGEN DETEC AMPLI PROBE: HCPCS

## 2022-02-06 PROCEDURE — 99283 EMERGENCY DEPT VISIT LOW MDM: CPT

## 2022-02-06 PROCEDURE — 74011250637 HC RX REV CODE- 250/637: Performed by: EMERGENCY MEDICINE

## 2022-02-06 RX ORDER — IBUPROFEN 800 MG/1
800 TABLET ORAL ONCE
Status: COMPLETED | OUTPATIENT
Start: 2022-02-06 | End: 2022-02-06

## 2022-02-06 RX ORDER — ACETAMINOPHEN 500 MG
1000 TABLET ORAL ONCE
Status: COMPLETED | OUTPATIENT
Start: 2022-02-06 | End: 2022-02-06

## 2022-02-06 RX ADMIN — IBUPROFEN 800 MG: 800 TABLET, FILM COATED ORAL at 12:40

## 2022-02-06 RX ADMIN — ACETAMINOPHEN 1000 MG: 500 TABLET ORAL at 12:40

## 2022-02-06 NOTE — Clinical Note
[facility] EMERGENCY DEPARTMENT  [address] [id number] [phone]    Work/School Note    Date: 2/6/2022     To Whom It May concern:    Susana Reinoso was evaluated by the following provider(s):  Attending Provider: Hugo Soliz [address] [place] [address] virus is suspected. Per the CDC guidelines we recommend home isolation until the following conditions are all met:    1. At least five days have passed since symptoms first appeared and/or had a close exposure,   2. After home isolation for five days, wearing a mask around others for the next five days,  3. At least 24 have passed since last fever without the use of fever-reducing medications and  4.  Symptoms (eg cough, shortness of breath) have improved      Sincerely,          Goran Sibley MD

## 2022-02-06 NOTE — ED PROVIDER NOTES
EMERGENCY DEPARTMENT HISTORY AND PHYSICAL EXAM      Date: 2/6/2022  Patient Name: Shani Torre    History of Presenting Illness     Chief Complaint   Patient presents with    Sinus Pain       History Provided By: Patient    HPI: Shani Torre, 32 y.o. female presents to the ED with CC of fever, sinus pain, runny nose, sore throat as well as a dry cough. Symptoms started yesterday. Prior to that she was in her normal state of good health. Patient has no known sick contacts. She has been taking NyQuil and Mucinex for symptom control with some relief. She is vaccinated for COVID. Patient denies SOB, chest pain, or any neurological symptoms. There are no other complaints, changes, or physical findings at this time. Past History     Past Medical History:  Past Medical History:   Diagnosis Date    Anemia     Murmur     Valvular heart disease        Allergies: Allergies   Allergen Reactions    Amoxicillin Rash     Skin peeling       Review of Systems   Vital signs and nursing notes reviewed  Review of Systems   Constitutional: Positive for activity change, fatigue and fever. HENT: Positive for congestion, sinus pain and sore throat. Negative for postnasal drip and rhinorrhea. Eyes: Negative for visual disturbance. Respiratory: Negative for cough and shortness of breath. Cardiovascular: Negative for chest pain. Gastrointestinal: Negative for abdominal pain, diarrhea, nausea and vomiting. Genitourinary: Negative for dysuria. Musculoskeletal: Positive for myalgias. Negative for arthralgias. Skin: Negative for rash and wound. Neurological: Negative for headaches. Psychiatric/Behavioral: Negative for confusion. All other systems reviewed and are negative. Physical Exam     Visit Vitals  /70   Pulse (!) 101   Temp 100 °F (37.8 °C)   Resp 18   Ht 5' 1\" (1.549 m)   Wt 59.4 kg (131 lb)   SpO2 98%   BMI 24.75 kg/m²     CONSTITUTIONAL: Alert, in no distress.  Appears stated age. HEAD:  Normocephalic, atraumatic. Frontal sinus tenderness to palpation. Normal posterior oropharynx. EYES: EOM intact. No conjunctival injection or scleral icterus  Neck:  Supple. No meningismus  RESP: Normal with no work of breathing, speaking in full sentences. Clear to auscultation bilaterally. CV: Well perfused. Normal heart sounds. NEURO: Alert with normal mentation, moving extremities spontaneously  PSYCH: Normal mood, normal affect      Medical Decision Making   Patient presents for COVID 19 testing with normal oxygen saturation and mild URI symptoms or COVID 19 exposure. COVID 19 testing was conducted. The patient was given quarantine/isolation recommendations and agrees with the plan to be discharged home. They were provided instructions to return for difficulty breathing, chest pain, altered mentation, or any other new or worsening symptoms. ED Course:   Initial assessment performed. The patients presenting problems have been discussed, and they are in agreement with the care plan formulated and outlined with them. I have encouraged them to ask questions as they arise throughout their visit. ED Course as of 02/06/22 1354   Sun Feb 06, 2022   [age]-year-old female presents for evaluation of headache, sore throat, sinus pressure, nasal congestion and dry cough. Symptoms been present since yesterday. Otherwise in her normal state of good health. Well-appearing with a benign exam. Differential diagnosis includes viral syndrome including COVID-19 versus other viral syndrome. We will treat symptomatically. Covid test given. Discharged home. [LW]      ED Course User Index  [LW] Justin Darden MD       Critical Care Time: None    Disposition:  DISCHARGE NOTE:  The pt is ready for discharge. The pt's signs, symptoms, diagnosis, and discharge instructions have been discussed and pt has conveyed their understanding.  The pt is to follow up as recommended or return to ER should their symptoms worsen. Plan has been discussed and pt is in agreement. PLAN:  1. Discharge Medication List as of 2/6/2022  1:13 PM        2. Follow-up Information     Follow up With Specialties Details Why Contact Info    Rosalinda Bloom MD Family Medicine In 3 days  [address] [facility]  [phone]      [facility]  As needed [address] [place] [id number] [phone]        3. COVID Testing results will be called once available if positive. Patient should utilize [employer] to access results. 4. Take Tylenol or Ibuprofen as needed  5. Drink plenty of fluids  6. Return to ED if worse especially if any shortness of breath, chest pain or altered mentation. Diagnosis     Clinical Impression:   1. Suspected COVID-19 virus infection            Please note that this dictation was completed with [employer], the computer voice recognition software. Quite often unanticipated grammatical, syntax, homophones, and other interpretive errors are inadvertently transcribed by the computer software. Please disregards these errors. Please excuse any errors that have escaped final proofreading.

## 2022-02-06 NOTE — DISCHARGE INSTRUCTIONS
Thank you! Thank you for allowing me to care for you in the emergency department. I sincerely hope that you are satisfied with your visit today. It is my goal to provide you with excellent care. Below you will find a list of your labs and imaging from your visit today. Should you have any questions regarding these results please do not hesitate to call the emergency department. Labs -     Recent Results (from the past 12 hour(s))   SARS-COV-2    Collection Time: 02/06/22 12:45 PM   Result Value Ref Range    SARS-CoV-2 Please find results under separate order         Radiologic Studies -   No orders to display     CT Results  (Last 48 hours)      None          CXR Results  (Last 48 hours)      None               If you feel that you have not received excellent quality care or timely care, please ask to speak to the nurse manager. Please choose us in the future for your continued health care needs. ------------------------------------------------------------------------------------------------------------  The exam and treatment you received in the Emergency Department were for an urgent problem and are not intended as complete care. It is important that you follow-up with a doctor, nurse practitioner, or physician assistant to:  (1) confirm your diagnosis,  (2) re-evaluation of changes in your illness and treatment, and  (3) for ongoing care. If your symptoms become worse or you do not improve as expected and you are unable to reach your usual health care provider, you should return to the Emergency Department. We are available 24 hours a day. Please take your discharge instructions with you when you go to your follow-up appointment. If you have any problem arranging a follow-up appointment, contact the Emergency Department immediately. If a prescription has been provided, please have it filled as soon as possible to prevent a delay in treatment.  Read the entire medication instruction sheet provided to you by the pharmacy. If you have any questions or reservations about taking the medication due to side effects or interactions with other medications, please call your primary care physician or contact the ER to speak with the charge nurse. Make an appointment with your family doctor or the physician you were referred to for follow-up of this visit as instructed on your discharge paperwork, as this is a mandatory follow-up. Return to the ER if you are unable to be seen or if you are unable to be seen in a timely manner. If you have any problem arranging the follow-up visit, contact the Emergency Department immediately.

## 2022-02-07 LAB
SARS-COV-2, XPLCVT: DETECTED
SOURCE, COVRS: ABNORMAL

## 2022-03-20 PROBLEM — F41.1 GAD (GENERALIZED ANXIETY DISORDER): Status: ACTIVE | Noted: 2018-12-18

## 2022-05-05 ENCOUNTER — OFFICE VISIT (OUTPATIENT)
Dept: FAMILY MEDICINE CLINIC | Age: 27
End: 2022-05-05
Payer: COMMERCIAL

## 2022-05-05 VITALS
BODY MASS INDEX: 22.66 KG/M2 | HEART RATE: 99 BPM | RESPIRATION RATE: 18 BRPM | SYSTOLIC BLOOD PRESSURE: 107 MMHG | OXYGEN SATURATION: 99 % | HEIGHT: 61 IN | TEMPERATURE: 99 F | WEIGHT: 120 LBS | DIASTOLIC BLOOD PRESSURE: 71 MMHG

## 2022-05-05 DIAGNOSIS — R63.4 UNEXPLAINED WEIGHT LOSS: Primary | ICD-10-CM

## 2022-05-05 PROCEDURE — 99214 OFFICE O/P EST MOD 30 MIN: CPT | Performed by: NURSE PRACTITIONER

## 2022-05-05 NOTE — PROGRESS NOTES
Chief Complaint   Patient presents with    Weight Loss    Labs     Patient in office today for weight loss that has been noted in the past 2 months. Pt has lost 10lbs since lov. Denies changes in diet. Does exercise-2 days per week. Lasting for 1-2hrs-cardio. Denies new medications or supplements. Denies changes in bowel movement. Denies n/v,acid reflux or indigestion. Family Hx; Mother-stomach ulcer. MAunt-thyroid disease. Pt would like labs ordered for weight loss. 1. Have you been to the ER, urgent care clinic since your last visit? Hospitalized since your last visit? No    2. Have you seen or consulted any other health care providers outside of the [address] [place] [address] since your last visit? Include any pap smears or colon screening.  No

## 2022-05-05 NOTE — PROGRESS NOTES
Chief Complaint   Patient presents with    Weight Loss    Labs     Patient in office today for weight loss that has been noted in the past 2 months. Pt has lost 10 lbs since lov. Denies changes in diet. Does exercise- 2 days per week. Lasting for 1-2hrs-cardio. Denies new medications or supplements. Denies changes in bowel movement. Denies n/v, acid reflux or indigestion. Family Hx; Mother-stomach ulcer. MAunt-thyroid disease. Pt would like labs ordered for weight loss. Denies any cp, sob, and dyspnea. Denies any ha or dizziness. Denies any n/v/c/d. Denies any heartburn or reflux. No belching. Denies any urinary sx. Denies any rashes. Does have some low back pain. Eating breakfast and dinner. Maybe a snack in between. Denies restricting or changes to her eating habits. On average getting about 6 hours of sleep at night. Denies any anxiety, feels controlled. Taking her effexor daily. Chief Complaint   Patient presents with    Weight Loss    Labs     she is a 32y.o. year old female who presents for evalution. Reviewed PmHx, RxHx, FmHx, SocHx, AllgHx and updated and dated in the chart.     Review of Systems - negative except as listed above in the HPI    Objective:     Vitals:    05/05/22 1600   BP: 107/71   Pulse: 99   Resp: 18   Temp: 99 °F (37.2 °C)   TempSrc: Oral   SpO2: 99%   Weight: 120 lb (54.4 kg)   Height: 5' 1\" (1.549 m)     Physical Examination: General appearance - alert, well appearing, and in no distress  Mental status - normal mood and behavior  Ears - bilateral TM's and external ear canals normal  Nose - normal and patent, no erythema, discharge or polyps  Mouth - mucous membranes moist, pharynx normal without lesions  Neck - supple, no significant adenopathy, carotids upstroke normal bilaterally, no bruits, thyroid exam: thyroid is normal in size without nodules or tenderness  Chest - clear to auscultation, no wheezes, rales or rhonchi, symmetric air entry  Heart - normal rate, regular rhythm, normal S1, S2, no murmurs  Abdomen - soft, nontender, nondistended, no masses or organomegaly  bowel sounds normal  Neurological - DTR's normal and symmetric, motor and sensory grossly normal bilaterally, normal muscle tone, no tremors, strength 5/5  Musculoskeletal - no joint tenderness, deformity or swelling  Extremities - peripheral pulses normal, no ankle edema, no clubbing or cyanosis  Skin - normal coloration and turgor    Assessment/ Plan:   Diagnoses and all orders for this visit:    1. Unexplained weight loss  -     METABOLIC PANEL, COMPREHENSIVE; Future  -     CBC WITH AUTOMATED DIFF; Future  -     TSH 3RD GENERATION; Future  -     T4, FREE; Future  -     VITAMIN D, 25 HYDROXY; Future  -     HIV 1/2 AG/AB, 4TH GENERATION,W RFLX CONFIRM; Future  Pt looks healthy to me. Was at this weight a few years ago for some time. She is active and following healthy habits. Reassured her that if her labs come back normal, no reason to be concerned so long as weight does not continue drop so rapidly. I have discussed the diagnosis with the patient and the intended plan as seen in the above orders. The patient has received an after-visit summary and questions were answered concerning future plans. Medication Side Effects and Warnings were discussed with patient: yes  Patient Labs were reviewed and or requested: yes  Patient Past Records were reviewed and or requested  yes  Patient / Caregiver Understanding of treatment plan was verbalized during office visit YES    MURIEL David    There are no Patient Instructions on file for this visit.

## 2022-05-06 DIAGNOSIS — E55.9 VITAMIN D DEFICIENCY: Primary | ICD-10-CM

## 2022-05-06 LAB
25(OH)D3+25(OH)D2 SERPL-MCNC: 13.7 NG/ML (ref 30–100)
ALBUMIN SERPL-MCNC: 3.9 G/DL (ref 3.9–5)
ALBUMIN/GLOB SERPL: 1.1 {RATIO} (ref 1.2–2.2)
ALP SERPL-CCNC: 56 IU/L (ref 44–121)
ALT SERPL-CCNC: 13 IU/L (ref 0–32)
AST SERPL-CCNC: 13 IU/L (ref 0–40)
BASOPHILS # BLD AUTO: 0.1 X10E3/UL (ref 0–0.2)
BASOPHILS NFR BLD AUTO: 1 %
BILIRUB SERPL-MCNC: 0.2 MG/DL (ref 0–1.2)
BUN SERPL-MCNC: 9 MG/DL (ref 6–20)
BUN/CREAT SERPL: 16 (ref 9–23)
CALCIUM SERPL-MCNC: 9.4 MG/DL (ref 8.7–10.2)
CHLORIDE SERPL-SCNC: 102 MMOL/L (ref 96–106)
CO2 SERPL-SCNC: 22 MMOL/L (ref 20–29)
CREAT SERPL-MCNC: 0.58 MG/DL (ref 0.57–1)
EGFR: 128 ML/MIN/1.73
EOSINOPHIL # BLD AUTO: 0.4 X10E3/UL (ref 0–0.4)
EOSINOPHIL NFR BLD AUTO: 7 %
ERYTHROCYTE [DISTWIDTH] IN BLOOD BY AUTOMATED COUNT: 14.7 % (ref 11.7–15.4)
GLOBULIN SER CALC-MCNC: 3.4 G/DL (ref 1.5–4.5)
GLUCOSE SERPL-MCNC: 81 MG/DL (ref 65–99)
HCT VFR BLD AUTO: 41.9 % (ref 34–46.6)
HGB BLD-MCNC: 12.6 G/DL (ref 11.1–15.9)
HIV 1+2 AB+HIV1 P24 AG SERPL QL IA: NON REACTIVE
IMM GRANULOCYTES # BLD AUTO: 0 X10E3/UL (ref 0–0.1)
IMM GRANULOCYTES NFR BLD AUTO: 0 %
LYMPHOCYTES # BLD AUTO: 2 X10E3/UL (ref 0.7–3.1)
LYMPHOCYTES NFR BLD AUTO: 32 %
MCH RBC QN AUTO: 21.9 PG (ref 26.6–33)
MCHC RBC AUTO-ENTMCNC: 30.1 G/DL (ref 31.5–35.7)
MCV RBC AUTO: 73 FL (ref 79–97)
MONOCYTES # BLD AUTO: 0.5 X10E3/UL (ref 0.1–0.9)
MONOCYTES NFR BLD AUTO: 7 %
NEUTROPHILS # BLD AUTO: 3.4 X10E3/UL (ref 1.4–7)
NEUTROPHILS NFR BLD AUTO: 53 %
PLATELET # BLD AUTO: 249 X10E3/UL (ref 150–450)
POTASSIUM SERPL-SCNC: 4 MMOL/L (ref 3.5–5.2)
PROT SERPL-MCNC: 7.3 G/DL (ref 6–8.5)
RBC # BLD AUTO: 5.76 X10E6/UL (ref 3.77–5.28)
SODIUM SERPL-SCNC: 140 MMOL/L (ref 134–144)
T4 FREE SERPL-MCNC: 1.02 NG/DL (ref 0.82–1.77)
TSH SERPL DL<=0.005 MIU/L-ACNC: 0.44 UIU/ML (ref 0.45–4.5)
WBC # BLD AUTO: 6.3 X10E3/UL (ref 3.4–10.8)

## 2022-05-06 RX ORDER — ERGOCALCIFEROL 1.25 MG/1
50000 CAPSULE ORAL
Qty: 12 CAPSULE | Refills: 0 | Status: SHIPPED | OUTPATIENT
Start: 2022-05-06

## 2022-05-06 NOTE — PROGRESS NOTES
The following message was sent to pt via [employer] portal in reference to lab results:  Hi Cee,   Attached are the results of your most recent lab work. I have the following recommendations:    1. Your CBC which looks at your white blood cells, red blood cells, and hemoglobin came back looking stable compared to previous checks. 2. Your metabolic panel which looks at your blood glucose, liver function, and kidney function looks normal.     3. Your TSH is a little low which suggests you could be hyperthyroid or your thyroid gland in  your neck could be making too much thyroid hormone. This could actually explain your weight loss. It's so close to being in normal range so I recommend we just give it 4 weeks and then have you come back to recheck the TSH and do some additional thyroid labs with it to see if you are truly hyperthyroid. 4. Your vitamin D level is very low or deficient. I would like you to take a once weekly vitamin D supplement over the next 3 months to replete this. Once you have finished that, start taking an over the counter calcium and vitamin D supplement that contains 2,000 IUs of vitamin D daily to prevent your levels from dropping again. Having normal vitamin D levels is important because you need vitamin D to absorb calcium into the bone and having low vitamin D levels increases your risk for osteoporosis down the road. 5. You are negative for HIV infection. Lets recheck your TSH in 4 weeks. Please let me know if you have any questions or concerns regarding these results.    Yen Oates, AARONC

## 2022-07-18 ENCOUNTER — OFFICE VISIT (OUTPATIENT)
Dept: FAMILY MEDICINE CLINIC | Age: 27
End: 2022-07-18
Payer: COMMERCIAL

## 2022-07-18 VITALS
SYSTOLIC BLOOD PRESSURE: 106 MMHG | HEIGHT: 61 IN | BODY MASS INDEX: 23.6 KG/M2 | TEMPERATURE: 98.8 F | HEART RATE: 79 BPM | RESPIRATION RATE: 18 BRPM | OXYGEN SATURATION: 99 % | DIASTOLIC BLOOD PRESSURE: 70 MMHG | WEIGHT: 125 LBS

## 2022-07-18 DIAGNOSIS — E05.90 HYPERTHYROIDISM: Primary | ICD-10-CM

## 2022-07-18 PROCEDURE — 99213 OFFICE O/P EST LOW 20 MIN: CPT | Performed by: NURSE PRACTITIONER

## 2022-07-18 NOTE — PROGRESS NOTES
Chief Complaint   Patient presents with    Thyroid Problem    Labs       1. Patient in office today for recheck on tsh levels. Have no concerns. 1. Have you been to the ER, urgent care clinic since your last visit? Hospitalized since your last visit? No    2. Have you seen or consulted any other health care providers outside of the [address] [place] [address] since your last visit? Include any pap smears or colon screening.  No

## 2022-07-18 NOTE — PROGRESS NOTES
Chief Complaint   Patient presents with    Thyroid Problem    Labs     Patient in office today for recheck on tsh levels. TSH was 0.436 with labs drawn early May. Gained some weight back, up to 125. Mood is good. In a new relationship and happier. Denies any other concerns at this time. Chief Complaint   Patient presents with    Thyroid Problem    Labs     she is a 32y.o. year old female who presents for evalution. Reviewed PmHx, RxHx, FmHx, SocHx, AllgHx and updated and dated in the chart. Review of Systems - negative except as listed above in the HPI    Objective:     Vitals:    07/18/22 1020   BP: 106/70   Pulse: 79   Resp: 18   Temp: 98.8 °F (37.1 °C)   TempSrc: Oral   SpO2: 99%   Weight: 125 lb (56.7 kg)   Height: 5' 1\" (1.549 m)     Physical Examination: General appearance - alert, well appearing, and in no distress  Mental status - normal mood, behavior  Neck - thyroid exam: thyroid is normal in size without nodules or tenderness  Chest - clear to auscultation, no wheezes, rales or rhonchi, symmetric air entry  Heart - normal rate, regular rhythm, normal S1, S2    Assessment/ Plan:   Diagnoses and all orders for this visit:    1. Hyperthyroidism  -     TSH 3RD GENERATION; Future  -     T4, FREE; Future  -     THYROID PEROXIDASE (TPO) AB; Future  Will notify results and deviate plan based on findings. Could be a subclinical hyperthyroidism. Consider referral to endo pending results. I have discussed the diagnosis with the patient and the intended plan as seen in the above orders. The patient has received an after-visit summary and questions were answered concerning future plans.      Medication Side Effects and Warnings were discussed with patient: no  Patient Labs were reviewed and or requested: yes  Patient Past Records were reviewed and or requested  yes  Patient / Caregiver Understanding of treatment plan was verbalized during office visit YES    MURIEL Collins    There are no Patient Instructions on file for this visit.

## 2022-07-25 ENCOUNTER — TELEPHONE (OUTPATIENT)
Dept: CARDIOLOGY CLINIC | Age: 27
End: 2022-07-25

## 2022-08-02 ENCOUNTER — APPOINTMENT (OUTPATIENT)
Dept: GENERAL RADIOLOGY | Age: 27
End: 2022-08-02
Attending: EMERGENCY MEDICINE
Payer: COMMERCIAL

## 2022-08-02 ENCOUNTER — HOSPITAL ENCOUNTER (EMERGENCY)
Age: 27
Discharge: HOME | End: 2022-08-02
Attending: EMERGENCY MEDICINE
Payer: COMMERCIAL

## 2022-08-02 VITALS
HEART RATE: 86 BPM | TEMPERATURE: 98 F | DIASTOLIC BLOOD PRESSURE: 87 MMHG | OXYGEN SATURATION: 98 % | HEIGHT: 61 IN | SYSTOLIC BLOOD PRESSURE: 132 MMHG | RESPIRATION RATE: 20 BRPM | BODY MASS INDEX: 23.6 KG/M2 | WEIGHT: 125 LBS

## 2022-08-02 DIAGNOSIS — R07.9 CHEST PAIN, UNSPECIFIED TYPE: ICD-10-CM

## 2022-08-02 DIAGNOSIS — F41.1 ANXIETY STATE: Primary | ICD-10-CM

## 2022-08-02 LAB
ANION GAP SERPL CALC-SCNC: 9 MMOL/L (ref 5–15)
BASOPHILS # BLD: 0 K/UL (ref 0–0.1)
BASOPHILS NFR BLD: 1 % (ref 0–1)
BUN SERPL-MCNC: 12 MG/DL (ref 6–20)
BUN/CREAT SERPL: 19 (ref 12–20)
CALCIUM SERPL-MCNC: 9.3 MG/DL (ref 8.6–10)
CHLORIDE SERPL-SCNC: 107 MMOL/L (ref 98–107)
CO2 SERPL-SCNC: 25 MMOL/L (ref 22–29)
CREAT SERPL-MCNC: 0.63 MG/DL (ref 0.5–0.9)
DIFFERENTIAL METHOD BLD: ABNORMAL
EOSINOPHIL # BLD: 0.5 K/UL (ref 0–0.4)
EOSINOPHIL NFR BLD: 9 % (ref 0–7)
ERYTHROCYTE [DISTWIDTH] IN BLOOD BY AUTOMATED COUNT: 14.8 % (ref 11.5–14.5)
GLUCOSE SERPL-MCNC: 92 MG/DL (ref 65–100)
HCT VFR BLD AUTO: 39.9 % (ref 35–47)
HGB BLD-MCNC: 12.6 G/DL (ref 11.5–16)
IMM GRANULOCYTES # BLD AUTO: 0 K/UL (ref 0–0.04)
IMM GRANULOCYTES NFR BLD AUTO: 0 % (ref 0–0.5)
LYMPHOCYTES # BLD: 2 K/UL (ref 0.8–3.5)
LYMPHOCYTES NFR BLD: 33 % (ref 12–49)
MCH RBC QN AUTO: 22.2 PG (ref 26–34)
MCHC RBC AUTO-ENTMCNC: 31.6 G/DL (ref 30–36.5)
MCV RBC AUTO: 70.2 FL (ref 80–99)
MONOCYTES # BLD: 0.4 K/UL (ref 0–1)
MONOCYTES NFR BLD: 7 % (ref 5–13)
NEUTS SEG # BLD: 3 K/UL (ref 1.8–8)
NEUTS SEG NFR BLD: 50 % (ref 32–75)
NRBC # BLD: 0 K/UL (ref 0–0.01)
NRBC BLD-RTO: 0 PER 100 WBC
PLATELET # BLD AUTO: 216 K/UL (ref 150–400)
PMV BLD AUTO: 10.4 FL (ref 8.9–12.9)
POTASSIUM SERPL-SCNC: 4 MMOL/L (ref 3.5–5.1)
RBC # BLD AUTO: 5.68 M/UL (ref 3.8–5.2)
SODIUM SERPL-SCNC: 141 MMOL/L (ref 136–145)
TROPONIN I BLD-MCNC: <0.04 NG/ML (ref 0–0.08)
WBC # BLD AUTO: 6 K/UL (ref 3.6–11)

## 2022-08-02 PROCEDURE — 93005 ELECTROCARDIOGRAM TRACING: CPT

## 2022-08-02 PROCEDURE — 71045 X-RAY EXAM CHEST 1 VIEW: CPT

## 2022-08-02 PROCEDURE — 74011250637 HC RX REV CODE- 250/637: Performed by: EMERGENCY MEDICINE

## 2022-08-02 PROCEDURE — 80048 BASIC METABOLIC PNL TOTAL CA: CPT

## 2022-08-02 PROCEDURE — 85025 COMPLETE CBC W/AUTO DIFF WBC: CPT

## 2022-08-02 PROCEDURE — 84484 ASSAY OF TROPONIN QUANT: CPT

## 2022-08-02 PROCEDURE — 36415 COLL VENOUS BLD VENIPUNCTURE: CPT

## 2022-08-02 PROCEDURE — 99285 EMERGENCY DEPT VISIT HI MDM: CPT

## 2022-08-02 RX ORDER — LORAZEPAM 1 MG/1
1 TABLET ORAL ONCE
Status: COMPLETED | OUTPATIENT
Start: 2022-08-02 | End: 2022-08-02

## 2022-08-02 RX ADMIN — LORAZEPAM 1 MG: 1 TABLET ORAL at 22:46

## 2022-08-03 ENCOUNTER — OFFICE VISIT (OUTPATIENT)
Dept: CARDIOLOGY CLINIC | Age: 27
End: 2022-08-03
Payer: COMMERCIAL

## 2022-08-03 VITALS
HEIGHT: 61 IN | OXYGEN SATURATION: 100 % | SYSTOLIC BLOOD PRESSURE: 108 MMHG | BODY MASS INDEX: 22.66 KG/M2 | HEART RATE: 98 BPM | RESPIRATION RATE: 16 BRPM | WEIGHT: 120 LBS | DIASTOLIC BLOOD PRESSURE: 64 MMHG

## 2022-08-03 DIAGNOSIS — R07.9 CHEST PAIN, UNSPECIFIED TYPE: Primary | ICD-10-CM

## 2022-08-03 DIAGNOSIS — Z98.890 S/P MVR (MITRAL VALVE REPAIR): ICD-10-CM

## 2022-08-03 DIAGNOSIS — R06.02 SOB (SHORTNESS OF BREATH): ICD-10-CM

## 2022-08-03 LAB
ATRIAL RATE: 90 BPM
CALCULATED P AXIS, ECG09: 60 DEGREES
CALCULATED R AXIS, ECG10: 87 DEGREES
CALCULATED T AXIS, ECG11: 59 DEGREES
DIAGNOSIS, 93000: NORMAL
P-R INTERVAL, ECG05: 158 MS
Q-T INTERVAL, ECG07: 358 MS
QRS DURATION, ECG06: 76 MS
QTC CALCULATION (BEZET), ECG08: 437 MS
VENTRICULAR RATE, ECG03: 90 BPM

## 2022-08-03 PROCEDURE — 99214 OFFICE O/P EST MOD 30 MIN: CPT | Performed by: SPECIALIST

## 2022-08-03 NOTE — ED PROVIDER NOTES
Pt states she had a panic attack at work around 2000 and has been having chest pressure with some stabbing pains since. No s/s of distress obs. Skin warm and dry. Resp wnl    71-year-old female presenting ER with complaint of chest pain and some shortness of breath. Patient reports having history of anxiety and felt anxiety attack coming on started getting some chest pain with this for  Has history of mitral valve repair 10 years ago and reports that whenever she has chest pain she is told to come in to get checked out for  No anticoagulation. No history of DVT or PE no lower leg swelling. Patient reports symptoms seem like they are slightly improved. No history of heart attack only valve repair.        Past Medical History:   Diagnosis Date    Anemia     Murmur     Valvular heart disease        Past Surgical History:   Procedure Laterality Date    CO CARDIAC SURG PROCEDURE UNLIST           Family History:   Problem Relation Age of Onset    Cancer Father         LYMPHOMA    Heart Disease Father     Other Mother     OSTEOARTHRITIS Mother     Kidney Disease Mother         ANKYLOSING SPONDYLITIS    Diabetes Maternal Grandmother     Hypertension Maternal Grandmother     Kidney Disease Maternal Grandmother     Cancer Maternal Aunt         LUNG CA - SMOKER    Other Maternal Aunt         SARCOIDOSIS    Anesth Problems Neg Hx        Social History     Socioeconomic History    Marital status: SINGLE     Spouse name: Not on file    Number of children: Not on file    Years of education: Not on file    Highest education level: Not on file   Occupational History    Not on file   Tobacco Use    Smoking status: Never    Smokeless tobacco: Never   Substance and Sexual Activity    Alcohol use: No     Comment: rarely    Drug use: No    Sexual activity: Never   Other Topics Concern    Not on file   Social History Narrative    Not on file     Social Determinants of Health     Financial Resource Strain: Not on file   Food Insecurity: Not on file   Transportation Needs: Not on file   Physical Activity: Not on file   Stress: Not on file   Social Connections: Not on file   Intimate Partner Violence: Not on file   Housing Stability: Not on file         ALLERGIES: Amoxicillin    Review of Systems   Constitutional:  Negative for chills and fever. HENT:  Negative for congestion and sore throat. Eyes:  Negative for pain. Respiratory:  Positive for shortness of breath. Cardiovascular:  Positive for chest pain. Gastrointestinal:  Negative for abdominal pain, diarrhea, nausea and vomiting. Genitourinary:  Negative for dysuria and flank pain. Musculoskeletal:  Negative for back pain and neck pain. Skin:  Negative for rash. Neurological:  Negative for dizziness and headaches. Psychiatric/Behavioral:  The patient is nervous/anxious. All other systems reviewed and are negative. Vitals:    08/02/22 2204   BP: 132/87   Pulse: 86   Resp: 20   Temp: 98 °F (36.7 °C)   SpO2: 98%   Weight: 56.7 kg (125 lb)   Height: 5' 1\" (1.549 m)            Physical Exam  Vitals reviewed. Constitutional:       Appearance: She is well-developed. HENT:      Head: Normocephalic. Eyes:      Conjunctiva/sclera: Conjunctivae normal.   Cardiovascular:      Rate and Rhythm: Normal rate and regular rhythm. Heart sounds: No murmur heard. Pulmonary:      Effort: Pulmonary effort is normal. No respiratory distress. Breath sounds: Normal breath sounds. Abdominal:      General: Bowel sounds are normal.      Palpations: Abdomen is soft. Tenderness: There is no abdominal tenderness. Musculoskeletal:         General: Normal range of motion. Cervical back: Normal range of motion and neck supple. Skin:     General: Skin is warm. Capillary Refill: Capillary refill takes less than 2 seconds. Findings: No rash. Neurological:      Mental Status: She is alert and oriented to person, place, and time.       Comments: No gross motor or sensory deficits        MDM  Number of Diagnoses or Management Options  Anxiety state  Chest pain, unspecified type  Diagnosis management comments: Patient presenting ER with atypical chest pain that came on with anxiety however has history of mitral valve repair was told to come in the ER anytime she had chest pain. Patient reports she typically has chest pain with her anxiety attacks. Satting 100% no concern for PE.  EKG unremarkable troponin labs unremarkable chest x-ray unremarkable. Patient stable for discharge       Amount and/or Complexity of Data Reviewed  Clinical lab tests: reviewed  Tests in the radiology section of CPT®: reviewed  Tests in the medicine section of CPT®: reviewed      ED Course as of 08/03/22 0110   Tue Aug 02, 2022   2321 11:21 PM  EKG: NSR. Rate 90. Normal intervals, normal axis, no ST-elevations or depressions. No signs of ischemia. Interpreted by Karen Oates MD       [ZD]      ED Course User Index  [ZD] Teo Garrido MD       Procedures               Recent Results (from the past 24 hour(s))   CBC WITH AUTOMATED DIFF    Collection Time: 08/02/22 10:15 PM   Result Value Ref Range    WBC 6.0 3.6 - 11.0 K/uL    RBC 5.68 (H) 3.80 - 5.20 M/uL    HGB 12.6 11.5 - 16.0 g/dL    HCT 39.9 35.0 - 47.0 %    MCV 70.2 (L) 80.0 - 99.0 FL    MCH 22.2 (L) 26.0 - 34.0 PG    MCHC 31.6 30.0 - 36.5 g/dL    RDW 14.8 (H) 11.5 - 14.5 %    PLATELET 508 667 - 916 K/uL    MPV 10.4 8.9 - 12.9 FL    NRBC 0.0 0  WBC    ABSOLUTE NRBC 0.00 0.00 - 0.01 K/uL    NEUTROPHILS 50 32 - 75 %    LYMPHOCYTES 33 12 - 49 %    MONOCYTES 7 5 - 13 %    EOSINOPHILS 9 (H) 0 - 7 %    BASOPHILS 1 0 - 1 %    IMMATURE GRANULOCYTES 0 0.0 - 0.5 %    ABS. NEUTROPHILS 3.0 1.8 - 8.0 K/UL    ABS. LYMPHOCYTES 2.0 0.8 - 3.5 K/UL    ABS. MONOCYTES 0.4 0.0 - 1.0 K/UL    ABS. EOSINOPHILS 0.5 (H) 0.0 - 0.4 K/UL    ABS. BASOPHILS 0.0 0.0 - 0.1 K/UL    ABS. IMM.  GRANS. 0.0 0.00 - 0.04 K/UL    DF AUTOMATED METABOLIC PANEL, BASIC    Collection Time: 08/02/22 10:15 PM   Result Value Ref Range    Sodium 141 136 - 145 mmol/L    Potassium 4.0 3.5 - 5.1 mmol/L    Chloride 107 98 - 107 mmol/L    CO2 25 22 - 29 mmol/L    Anion gap 9 5 - 15 mmol/L    Glucose 92 65 - 100 mg/dL    BUN 12 6 - 20 MG/DL    Creatinine 0.63 0.50 - 0.90 MG/DL    BUN/Creatinine ratio 19 12 - 20      GFR est AA >60 >60 ml/min/1.73m2    GFR est non-AA >60 >60 ml/min/1.73m2    Calcium 9.3 8.6 - 10.0 MG/DL   POC TROPONIN-I    Collection Time: 08/02/22 10:55 PM   Result Value Ref Range    Troponin-I (POC) <0.04 0.00 - 0.08 ng/mL   EKG, 12 LEAD, INITIAL    Collection Time: 08/02/22 11:02 PM   Result Value Ref Range    Ventricular Rate 90 BPM    Atrial Rate 90 BPM    P-R Interval 158 ms    QRS Duration 76 ms    Q-T Interval 358 ms    QTC Calculation (Bezet) 437 ms    Calculated P Axis 60 degrees    Calculated R Axis 87 degrees    Calculated T Axis 59 degrees    Diagnosis       Normal sinus rhythm  Normal ECG  When compared with ECG of 10-DEC-2013 13:43,  No significant change was found         XR CHEST PORT    Result Date: 8/2/2022  EXAM: XR CHEST PORT HISTORY: Chest pain. COMPARISON: 12/16/2013 FINDINGS: Single view(s) of the chest. The patient is status post sternotomy. The lungs are well inflated. No focal consolidation, pleural effusion, or pneumothorax. A valvular prosthesis overlies the heart. There is enlargement of the main pulmonary artery likely related to pulmonary hypertension. The visualized osseous structures are unremarkable. No acute cardiopulmonary process.

## 2022-08-03 NOTE — LETTER
8/3/2022    Patient: Tiffany Chopra   YOB: 1995   Date of Visit: 8/3/2022     Armani Justin, [address] [facility] [address]  [address] [facility] [address]    Dear Armani Justin MD,      Thank you for referring Ms. Piper Ramirez to [facility] for evaluation. My notes for this consultation are attached. If you have questions, please do not hesitate to call me. I look forward to following your patient along with you.       Sincerely,    Verónica Pope MD

## 2022-08-03 NOTE — ED TRIAGE NOTES
Pt states she had a panic attack at work around 2000 and has been having chest pressure with some stabbing pains since. No s/s of distress obs. Skin warm and dry.  Resp wnl

## 2022-08-03 NOTE — ED NOTES
The patient was discharged home by ER MD Aziza Arevalo and Nurse Chuy William in stable condition, accompanied by parent/guardian . The patient is alert and oriented, is in no respiratory distress and has vital signs within normal limits . The patient's diagnosis, condition and treatment were explained to patient or parent/guardian. The patient/responsible party expressed understanding. No prescriptions given to pt. No work/school note given to pt. A discharge plan has been developed. A  was not involved in the process. Aftercare instructions were given to the patient.

## 2022-08-03 NOTE — PATIENT INSTRUCTIONS

## 2022-08-03 NOTE — PROGRESS NOTES
Chief Complaint   Patient presents with    Follow-up    Chest Pain     Visit Vitals  /64 (BP 1 Location: Left upper arm, BP Patient Position: Sitting, BP Cuff Size: Adult)   Pulse 98   Resp 16   Ht 5' 1\" (1.549 m)   Wt 120 lb (54.4 kg)   SpO2 100%   BMI 22.67 kg/m²         Patient presents in office without complaint. She states that her CP and SOB have subsided. Just following up per recommendation of ED. Denies all cardiac symptoms. Recent ED visit for CP/SOB/Anxiety 7/8/22.

## 2022-08-03 NOTE — PROGRESS NOTES
CARDIOLOGY OFFICE NOTE    Olman Yap MD, [address], [place], [address] [facility]  Phone [phone]; Fax [phone]  Mobile [phone]   Voice Mail [phone]         ATTENTION:   This medical record was transcribed using an electronic medical records/speech recognition system. Although proofread, it may and can contain electronic, spelling and other errors. Corrections may be executed at a later time. Please feel free to contact us for any clarifications as needed. ICD-10-CM ICD-9-CM   1. Chest pain, unspecified type  R07.9 786.50   2. SOB (shortness of breath)  R06.02 786.05   3. S/P MVR (mitral valve repair)  Z98.890 V45.89            Glenis Huertas is a 32 y.o. female with non-rheumatic MR s/p MVR referred for follow up. Cardiac risk factors: none  I have personally obtained the history from the patient. HISTORY OF PRESENTING ILLNESS     She was recently seen in the emergency room on 7/8/2022 with chest discomfort and shortness of breath. She feels it was related to stress at work. Prior to that she has not had any issues with palpitations shortness of breath and actually came off of her beta-blocker.              ACTIVE PROBLEM LIST     Patient Active Problem List    Diagnosis Date Noted    MEDHAT (generalized anxiety disorder) 12/18/2018    S/P MVR (mitral valve repair) 12/13/2013    Mitral regurgitation 12/02/2013    Anemia, iron deficiency 01/16/2012    Scoliosis 12/07/2010           PAST MEDICAL HISTORY     Past Medical History:   Diagnosis Date    Anemia     Murmur     Valvular heart disease            PAST SURGICAL HISTORY     Past Surgical History:   Procedure Laterality Date    CA CARDIAC SURG PROCEDURE UNLIST            ALLERGIES     Allergies   Allergen Reactions    Amoxicillin Rash     Skin peeling          FAMILY HISTORY     Family History   Problem Relation Age of Onset    Cancer Father         LYMPHOMA    Heart Disease Father Other Mother     OSTEOARTHRITIS Mother     Kidney Disease Mother         ANKYLOSING SPONDYLITIS    Diabetes Maternal Grandmother     Hypertension Maternal Grandmother     Kidney Disease Maternal Grandmother     Cancer Maternal Aunt         LUNG CA - SMOKER    Other Maternal Aunt         SARCOIDOSIS    Anesth Problems Neg Hx     negative for cardiac disease       SOCIAL HISTORY     Social History     Socioeconomic History    Marital status: SINGLE   Tobacco Use    Smoking status: Never    Smokeless tobacco: Never   Substance and Sexual Activity    Alcohol use: No     Comment: rarely    Drug use: No    Sexual activity: Never         MEDICATIONS     Current Outpatient Medications   Medication Sig    hydrOXYzine HCL (ATARAX) 25 mg tablet Take 1 Tablet by mouth three (3) times daily as needed for Itching. mometasone (ELOCON) 0.1 % topical cream Apply  to affected area daily. venlafaxine-SR (EFFEXOR-XR) 150 mg capsule Take 1 Capsule by mouth daily. Maintenance dose. ergocalciferol (ERGOCALCIFEROL) 1,250 mcg (50,000 unit) capsule Take 1 Capsule by mouth every seven (7) days. (Patient not taking: Reported on 8/3/2022)     No current facility-administered medications for this visit. I have reviewed the nurses notes, vitals, problem list, allergy list, medical history, family, social history and medications. REVIEW OF SYMPTOMS   Pertinent positives per HPI  General: Pt denies excessive weight gain or loss. Pt is able to conduct ADL's  HEENT: Denies blurred vision, headaches, hearing loss, epistaxis and difficulty swallowing. Respiratory: Denies cough, congestion, shortness of breath, WONG, wheezing or stridor.   Cardiovascular: Denies precordial pain, palpitations, edema or PND  Gastrointestinal: Denies poor appetite, indigestion, abdominal pain or blood in stool  Genitourinary: Denies hematuria, dysuria, increased urinary frequency  Musculoskeletal: Denies joint pain or swelling from muscles or joints  Neurologic: Denies tremor, paresthesias, headache, or sensory motor disturbance  Psychiatric: Denies confusion, insomnia, depression  Integumentray: Denies rash, itching or ulcers. Hematologic: Denies easy bruising, bleeding     PHYSICAL EXAMINATION      Vitals:    08/03/22 1548   BP: 108/64   Pulse: 98   Resp: 16   SpO2: 100%   Weight: 120 lb (54.4 kg)   Height: 5' 1\" (1.549 m)     General: Well developed, in no acute distress. HEENT: No jaundice, oral mucosa moist, no oral ulcers  Neck: Supple, no stiffness, no lymphadenopathy, supple  Heart:  Normal S1/S2 negative S3 or S4. Regular, no murmur, gallop or rub, no jugular venous distention  Respiratory: Clear bilaterally x 4, no wheezing or rales  Extremities:  No edema, normal cap refill, no cyanosis. Musculoskeletal: No clubbing, no deformities  Neuro: A&Ox3, speech clear, gait stable, cooperative, no focal neurologic deficits  Skin: Skin color is normal. No rashes or lesions. Non diaphoretic, moist.        EKG: SR     DIAGNOSTIC DATA     1. Echocardiograms   (3/27/12) : EF 55-60%  (11/18/13):60-65%, Mitral valve: Flail anterior mitral valve leaflet. There was a marked  prolapse involving the medial segment of the anterior leaflet. There was  moderate to severe regurgitation.  (3/5/14): EF 55-60%  (10/2/19)- EF 65 %, MV annular calcification- mild/mod, MR mild, surgical repair - MV repair 2013  10/29/20-EF 60 - 65%, MV: Mitral valve repaired with annuloplasty ring. Mitral valve mean gradient is 11.9 mmHg. , mild MR, Additional mitral valve findings: There are redundant chordae    2. Lipids  2/10/17- , HDL 59, , TG 40  4/11/17- , HDL 62, , TG 47  9/11/18- , HDL 69, LDL 81, TG 38    3.  Holter  3/5/14- ST          LABORATORY DATA            Lab Results   Component Value Date/Time    WBC 6.0 08/02/2022 10:15 PM    HGB 12.6 08/02/2022 10:15 PM    HCT 39.9 08/02/2022 10:15 PM    PLATELET 082 66/28/2317 10:15 PM    MCV 70.2 (L) 08/02/2022 10:15 PM      Lab Results   Component Value Date/Time    Sodium 141 08/02/2022 10:15 PM    Potassium 4.0 08/02/2022 10:15 PM    Chloride 107 08/02/2022 10:15 PM    CO2 25 08/02/2022 10:15 PM    Anion gap 9 08/02/2022 10:15 PM    Glucose 92 08/02/2022 10:15 PM    BUN 12 08/02/2022 10:15 PM    Creatinine 0.63 08/02/2022 10:15 PM    BUN/Creatinine ratio 19 08/02/2022 10:15 PM    GFR est AA >60 08/02/2022 10:15 PM    GFR est non-AA >60 08/02/2022 10:15 PM    Calcium 9.3 08/02/2022 10:15 PM    Bilirubin, total 0.2 05/05/2022 12:00 AM    Alk. phosphatase 56 05/05/2022 12:00 AM    Protein, total 7.3 05/05/2022 12:00 AM    Albumin 3.9 05/05/2022 12:00 AM    Globulin 3.7 01/30/2020 08:15 AM    A-G Ratio 1.1 (L) 05/05/2022 12:00 AM    ALT (SGPT) 13 05/05/2022 12:00 AM           ASSESSMENT/RECOMMENDATIONS:.      1. Non-rheumatic mitral regurgitation s/p valve repair 2014  -No cardiac issues but she did have a period of tachycardia and shortness of breath so I will check another echo   2. Tachycardia   -She is off the metoprolol now but notes that she is only had the 1 episode when she was at work that was related to anxiety and not necessarily to stopping the metoprolol. I am okay with her continuing off metoprolol for the time being. 3. Return in 6 months or PRN. No orders of the defined types were placed in this encounter. Follow-up and Dispositions    Return in about 6 months (around 2/3/2023). I have discussed the diagnosis with  Estefania Joshi and the intended plan as seen in the above orders. Questions were answered concerning future plans. I have discussed medication side effects and warnings with the patient as well. Thank you,  Lonza MD Abdirahman for involving me in the care of  Estefania Joshi. Please do not hesitate to contact me for further questions/concerns. Olman Land MD, [facility]    Patient Care Team:  Matthew Gary MD as PCP - General (Family Medicine)  Ledy Kovacs LPN as [address] [facility]  Ines Hoffman MD as Referring Provider (Cardiovascular Disease Physician)  Shar Weems MD (Cardiothoracic Surgery)  Caridad Mccurdy MD (Hematology and Oncology)    [address], [address] [place] [address], [facility]      [phone] / [phone] Fax

## 2022-12-14 ENCOUNTER — DOCUMENTATION ONLY (OUTPATIENT)
Dept: ONCOLOGY | Age: 27
End: 2022-12-14

## 2022-12-14 NOTE — PROGRESS NOTES
[address] at [place]  [phone]    The patient was a NO SHOW for appointment with me today.

## 2022-12-15 ENCOUNTER — TELEPHONE (OUTPATIENT)
Dept: ONCOLOGY | Age: 27
End: 2022-12-15

## 2023-01-06 ENCOUNTER — DOCUMENTATION ONLY (OUTPATIENT)
Dept: ONCOLOGY | Age: 28
End: 2023-01-06

## 2023-01-06 NOTE — PROGRESS NOTES
[address] at [place] [address]  [phone]    The patient was a NO SHOW for appointment with me today. She was a no show last month as well, and rescheduled for today.

## 2023-02-07 NOTE — PROGRESS NOTES
[facility] at [address] [facility]  [place] Garcia: [phone]  F: [phone]      Reason for Visit:   Mary Abdi is a 32 y.o. female who is seen for follow up of anemia. History of Present Illness:   She was a no-show last month, rescheduled for today. She reports persistent and progressive fatigue. No dyspnea, dizziness, or pica. Menses remain normal, not particularly heavy. No other bleeding. Review of systems was obtained and pertinent findings reviewed above. Past medical history, social history, family history, medications, and allergies are located in the electronic medical record. Physical Exam:     Visit Vitals  /66 (BP 1 Location: Right arm, BP Patient Position: Sitting)   Pulse (!) 102   Temp 98.8 °F (37.1 °C) (Oral)   Resp 16   Ht 5' 1\" (1.549 m)   Wt 116 lb 3.2 oz (52.7 kg)   BMI 21.96 kg/m²     General: no distress  Respiratory: normal respiratory effort  CV: no peripheral edema  Skin: no rashes; no ecchymoses; no petechiae    Results:     Lab Results   Component Value Date/Time    WBC 4.3 02/10/2023 12:07 PM    HGB 12.1 02/10/2023 12:07 PM    HCT 38.0 02/10/2023 12:07 PM    PLATELET 932 34/67/3298 12:07 PM    MCV 73 (L) 02/10/2023 12:07 PM    ABS. NEUTROPHILS 1.6 02/10/2023 12:07 PM     Lab Results   Component Value Date/Time    Sodium 141 08/02/2022 10:15 PM    Potassium 4.0 08/02/2022 10:15 PM    Chloride 107 08/02/2022 10:15 PM    CO2 25 08/02/2022 10:15 PM    Glucose 92 08/02/2022 10:15 PM    BUN 12 08/02/2022 10:15 PM    Creatinine 0.63 08/02/2022 10:15 PM    GFR est AA >60 08/02/2022 10:15 PM    GFR est non-AA >60 08/02/2022 10:15 PM    Calcium 9.3 08/02/2022 10:15 PM    Glucose (POC) 108 (H) 12/15/2013 07:26 AM     Lab Results   Component Value Date/Time    Bilirubin, total 0.2 05/05/2022 12:00 AM    ALT (SGPT) 13 05/05/2022 12:00 AM    Alk.  phosphatase 56 05/05/2022 12:00 AM    Protein, total 7.3 05/05/2022 12:00 AM    Albumin 3.9 05/05/2022 12:00 AM    Globulin 3.7 01/30/2020 08:15 AM     Lab Results   Component Value Date/Time    Reticulocyte count 0.7 09/27/2016 04:29 PM    Iron % saturation 38 12/01/2021 11:41 AM    TIBC 258 12/01/2021 11:41 AM    Ferritin 202 (H) 12/01/2021 11:41 AM    Haptoglobin 99 09/27/2016 04:29 PM     09/27/2016 04:29 PM    Sed rate (ESR) 59 (H) 10/20/2011 11:40 AM    TSH 0.436 (L) 05/05/2022 12:00 AM    Lipase 153 11/11/2014 12:25 PM    HIV SCREEN 4TH GENERATION WRFX Non Reactive 05/05/2022 12:00 AM     Lab Results   Component Value Date/Time    INR 1.1 12/16/2013 04:00 AM    aPTT 30.3 (H) 12/16/2013 04:00 AM    D-Dimer 0.28 02/15/2017 04:31 PM    Factor VIII Activity 139 07/29/2016 12:17 PM    von Willebrand Factor (vWF) Ag 104 07/29/2016 12:17 PM    vWF Activity 71 07/29/2016 12:17 PM     Ferritin   Date Value   12/01/2021 202 ng/mL (H)   05/28/2021 29 ng/mL   05/30/2020 106 ng/mL   01/30/2020 10 NG/ML (L)   06/19/2019 86 ng/mL   12/14/2018 263 ng/mL (H)   09/19/2018 7 ng/mL (L)   02/21/2018 11 ng/mL (L)   08/09/2017 17 NG/ML   01/25/2017 79 ng/mL   09/27/2016 317 ng/mL (H)   08/22/2016 565 ng/mL (H)   07/29/2016 7 NG/ML (L)       9/27/2016:  Hemoglobin fractionation: normal    1/25/2017:  HGB: 13.4  WBC: 5.7  PLT: 260  MCV: 71  Iron saturation: 31%  Ferritin: 79    1/25/2017:  Smear: Microcytosis offset by a high normal RBC count, suggestive of minor thalassemia or innocent hemoglobinopathy.     12/1/2021:  Alpha thal:   Alpha-thalassemia minor, alpha-/alpha-   Mutation(s) identified:  alpha3.7 and alpha3.7    US pelvis, US transvaginal 7/13/2016: normal    EGD and colonoscopy on 10/22/18 with Dr. Candace Morgan normal.     Assessment:   1) Anemia, iron deficiency  Recurrent problem. Has required IV iron in 8/2016, 10/2018, 3/2020, and 7/2021. Anemia resolves with each treatment. She has been unable to take oral iron. HGB remains normal on recent labs at 12.1.   Iron studies not completed despite the order, will check today given her progressive fatigue. She remains at risk of recurrent anemia, given her history. I recommend monitoring every 6-12 months and repeating IV iron as needed. Insurance most recently declined injectafer, required venofer. The cause of her iron deficiency is thought to be menorrhagia. GI evaluation was unremarkable. 2) Menorrhagia  Improved, off OCPs. VWF panel was negative. 3) Alpha thalassemia minor  Confirmed on mutation testing. This is the cause of her persistent microcytosis. This condition is unlikely to have any clinical significance for her, but I do recommend folic acid PO daily.       Plan:     Labs today: Ferritin, iron profile  Possible IV iron pending lab results  Resume folic acid PO daily (OTC supplement)  Labs in 12 months: CBC, iron profile, ferritin (she prefers [facility])  Return to see me in 12 months    [address] [address] for virtual visit    Signed By: Nickolas Harris MD

## 2023-02-08 DIAGNOSIS — D50.9 IRON DEFICIENCY ANEMIA, UNSPECIFIED IRON DEFICIENCY ANEMIA TYPE: Primary | ICD-10-CM

## 2023-02-11 LAB
BASOPHILS # BLD AUTO: 0 X10E3/UL (ref 0–0.2)
BASOPHILS NFR BLD AUTO: 1 %
EOSINOPHIL # BLD AUTO: 0.5 X10E3/UL (ref 0–0.4)
EOSINOPHIL NFR BLD AUTO: 11 %
ERYTHROCYTE [DISTWIDTH] IN BLOOD BY AUTOMATED COUNT: 14.3 % (ref 11.7–15.4)
HCT VFR BLD AUTO: 38 % (ref 34–46.6)
HGB BLD-MCNC: 12.1 G/DL (ref 11.1–15.9)
IMM GRANULOCYTES # BLD AUTO: 0 X10E3/UL (ref 0–0.1)
IMM GRANULOCYTES NFR BLD AUTO: 0 %
LYMPHOCYTES # BLD AUTO: 1.9 X10E3/UL (ref 0.7–3.1)
LYMPHOCYTES NFR BLD AUTO: 42 %
MCH RBC QN AUTO: 23.1 PG (ref 26.6–33)
MCHC RBC AUTO-ENTMCNC: 31.8 G/DL (ref 31.5–35.7)
MCV RBC AUTO: 73 FL (ref 79–97)
MONOCYTES # BLD AUTO: 0.4 X10E3/UL (ref 0.1–0.9)
MONOCYTES NFR BLD AUTO: 8 %
NEUTROPHILS # BLD AUTO: 1.6 X10E3/UL (ref 1.4–7)
NEUTROPHILS NFR BLD AUTO: 38 %
PLATELET # BLD AUTO: 204 X10E3/UL (ref 150–450)
RBC # BLD AUTO: 5.24 X10E6/UL (ref 3.77–5.28)
WBC # BLD AUTO: 4.3 X10E3/UL (ref 3.4–10.8)

## 2023-02-15 ENCOUNTER — OFFICE VISIT (OUTPATIENT)
Dept: ONCOLOGY | Age: 28
End: 2023-02-15
Payer: COMMERCIAL

## 2023-02-15 VITALS
SYSTOLIC BLOOD PRESSURE: 109 MMHG | WEIGHT: 116.2 LBS | RESPIRATION RATE: 16 BRPM | BODY MASS INDEX: 21.94 KG/M2 | TEMPERATURE: 98.8 F | HEART RATE: 102 BPM | HEIGHT: 61 IN | DIASTOLIC BLOOD PRESSURE: 66 MMHG

## 2023-02-15 DIAGNOSIS — D50.9 IRON DEFICIENCY ANEMIA, UNSPECIFIED IRON DEFICIENCY ANEMIA TYPE: Primary | ICD-10-CM

## 2023-02-15 PROCEDURE — 99214 OFFICE O/P EST MOD 30 MIN: CPT | Performed by: INTERNAL MEDICINE

## 2023-02-15 NOTE — PROGRESS NOTES
Ronnie Vásquez is a 32 y.o. female here for follow up of anemia. 1. Have you been to the ER, urgent care clinic since your last visit? Hospitalized since your last visit? No    2. Have you seen or consulted any other health care providers outside of the [address] [place] [address] since your last visit? Include any pap smears or colon screening.  No

## 2023-02-16 ENCOUNTER — TELEPHONE (OUTPATIENT)
Dept: ONCOLOGY | Age: 28
End: 2023-02-16

## 2023-02-16 RX ORDER — DIPHENHYDRAMINE HYDROCHLORIDE 50 MG/ML
25 INJECTION, SOLUTION INTRAMUSCULAR; INTRAVENOUS AS NEEDED
Start: 2023-03-09

## 2023-02-16 RX ORDER — EPINEPHRINE 1 MG/ML
0.3 INJECTION, SOLUTION, CONCENTRATE INTRAVENOUS AS NEEDED
OUTPATIENT
Start: 2023-03-16

## 2023-02-16 RX ORDER — SODIUM CHLORIDE 9 MG/ML
5-40 INJECTION INTRAVENOUS AS NEEDED
OUTPATIENT
Start: 2023-03-23

## 2023-02-16 RX ORDER — EPINEPHRINE 1 MG/ML
0.3 INJECTION, SOLUTION, CONCENTRATE INTRAVENOUS AS NEEDED
OUTPATIENT
Start: 2023-03-09

## 2023-02-16 RX ORDER — SODIUM CHLORIDE 9 MG/ML
5-250 INJECTION, SOLUTION INTRAVENOUS AS NEEDED
OUTPATIENT
Start: 2023-03-23

## 2023-02-16 RX ORDER — ONDANSETRON 2 MG/ML
8 INJECTION INTRAMUSCULAR; INTRAVENOUS AS NEEDED
OUTPATIENT
Start: 2023-03-16

## 2023-02-16 RX ORDER — ACETAMINOPHEN 325 MG/1
650 TABLET ORAL AS NEEDED
Start: 2023-03-23

## 2023-02-16 RX ORDER — HYDROCORTISONE SODIUM SUCCINATE 100 MG/2ML
100 INJECTION, POWDER, FOR SOLUTION INTRAMUSCULAR; INTRAVENOUS AS NEEDED
OUTPATIENT
Start: 2023-03-09

## 2023-02-16 RX ORDER — SODIUM CHLORIDE 9 MG/ML
5-250 INJECTION, SOLUTION INTRAVENOUS AS NEEDED
OUTPATIENT
Start: 2023-02-23

## 2023-02-16 RX ORDER — SODIUM CHLORIDE 9 MG/ML
5-250 INJECTION, SOLUTION INTRAVENOUS AS NEEDED
OUTPATIENT
Start: 2023-03-16

## 2023-02-16 RX ORDER — EPINEPHRINE 1 MG/ML
0.3 INJECTION, SOLUTION, CONCENTRATE INTRAVENOUS AS NEEDED
OUTPATIENT
Start: 2023-03-02

## 2023-02-16 RX ORDER — HEPARIN 100 UNIT/ML
500 SYRINGE INTRAVENOUS AS NEEDED
Start: 2023-02-23

## 2023-02-16 RX ORDER — EPINEPHRINE 1 MG/ML
0.3 INJECTION, SOLUTION, CONCENTRATE INTRAVENOUS AS NEEDED
OUTPATIENT
Start: 2023-02-23

## 2023-02-16 RX ORDER — HEPARIN 100 UNIT/ML
500 SYRINGE INTRAVENOUS AS NEEDED
Start: 2023-03-02

## 2023-02-16 RX ORDER — SODIUM CHLORIDE 9 MG/ML
5-40 INJECTION INTRAVENOUS AS NEEDED
OUTPATIENT
Start: 2023-03-02

## 2023-02-16 RX ORDER — ACETAMINOPHEN 325 MG/1
650 TABLET ORAL AS NEEDED
Start: 2023-03-09

## 2023-02-16 RX ORDER — SODIUM CHLORIDE 9 MG/ML
5-250 INJECTION, SOLUTION INTRAVENOUS AS NEEDED
OUTPATIENT
Start: 2023-03-09

## 2023-02-16 RX ORDER — HEPARIN 100 UNIT/ML
500 SYRINGE INTRAVENOUS AS NEEDED
Start: 2023-03-23

## 2023-02-16 RX ORDER — SODIUM CHLORIDE 9 MG/ML
5-40 INJECTION INTRAVENOUS AS NEEDED
OUTPATIENT
Start: 2023-03-16

## 2023-02-16 RX ORDER — DIPHENHYDRAMINE HYDROCHLORIDE 50 MG/ML
25 INJECTION, SOLUTION INTRAMUSCULAR; INTRAVENOUS AS NEEDED
Start: 2023-03-16

## 2023-02-16 RX ORDER — HYDROCORTISONE SODIUM SUCCINATE 100 MG/2ML
100 INJECTION, POWDER, FOR SOLUTION INTRAMUSCULAR; INTRAVENOUS AS NEEDED
OUTPATIENT
Start: 2023-03-02

## 2023-02-16 RX ORDER — HYDROCORTISONE SODIUM SUCCINATE 100 MG/2ML
100 INJECTION, POWDER, FOR SOLUTION INTRAMUSCULAR; INTRAVENOUS AS NEEDED
OUTPATIENT
Start: 2023-03-16

## 2023-02-16 RX ORDER — ONDANSETRON 2 MG/ML
8 INJECTION INTRAMUSCULAR; INTRAVENOUS AS NEEDED
OUTPATIENT
Start: 2023-03-09

## 2023-02-16 RX ORDER — ACETAMINOPHEN 325 MG/1
650 TABLET ORAL AS NEEDED
Start: 2023-02-23

## 2023-02-16 RX ORDER — ACETAMINOPHEN 325 MG/1
650 TABLET ORAL AS NEEDED
Start: 2023-03-16

## 2023-02-16 RX ORDER — SODIUM CHLORIDE 9 MG/ML
5-40 INJECTION INTRAVENOUS AS NEEDED
OUTPATIENT
Start: 2023-02-23

## 2023-02-16 RX ORDER — SODIUM CHLORIDE 0.9 % (FLUSH) 0.9 %
5-40 SYRINGE (ML) INJECTION AS NEEDED
OUTPATIENT
Start: 2023-03-09

## 2023-02-16 RX ORDER — HYDROCORTISONE SODIUM SUCCINATE 100 MG/2ML
100 INJECTION, POWDER, FOR SOLUTION INTRAMUSCULAR; INTRAVENOUS AS NEEDED
OUTPATIENT
Start: 2023-02-23

## 2023-02-16 RX ORDER — ALBUTEROL SULFATE 0.83 MG/ML
2.5 SOLUTION RESPIRATORY (INHALATION) AS NEEDED
Start: 2023-03-23

## 2023-02-16 RX ORDER — SODIUM CHLORIDE 9 MG/ML
5-40 INJECTION INTRAVENOUS AS NEEDED
OUTPATIENT
Start: 2023-03-09

## 2023-02-16 RX ORDER — DIPHENHYDRAMINE HYDROCHLORIDE 50 MG/ML
25 INJECTION, SOLUTION INTRAMUSCULAR; INTRAVENOUS AS NEEDED
Start: 2023-03-02

## 2023-02-16 RX ORDER — ALBUTEROL SULFATE 0.83 MG/ML
2.5 SOLUTION RESPIRATORY (INHALATION) AS NEEDED
Start: 2023-03-02

## 2023-02-16 RX ORDER — DIPHENHYDRAMINE HYDROCHLORIDE 50 MG/ML
50 INJECTION, SOLUTION INTRAMUSCULAR; INTRAVENOUS AS NEEDED
Start: 2023-03-23

## 2023-02-16 RX ORDER — EPINEPHRINE 1 MG/ML
0.3 INJECTION, SOLUTION, CONCENTRATE INTRAVENOUS AS NEEDED
OUTPATIENT
Start: 2023-03-23

## 2023-02-16 RX ORDER — HEPARIN 100 UNIT/ML
500 SYRINGE INTRAVENOUS AS NEEDED
Start: 2023-03-16

## 2023-02-16 RX ORDER — SODIUM CHLORIDE 0.9 % (FLUSH) 0.9 %
5-40 SYRINGE (ML) INJECTION AS NEEDED
OUTPATIENT
Start: 2023-03-23

## 2023-02-16 RX ORDER — SODIUM CHLORIDE 9 MG/ML
5-250 INJECTION, SOLUTION INTRAVENOUS AS NEEDED
OUTPATIENT
Start: 2023-03-02

## 2023-02-16 RX ORDER — ONDANSETRON 2 MG/ML
8 INJECTION INTRAMUSCULAR; INTRAVENOUS AS NEEDED
OUTPATIENT
Start: 2023-03-23

## 2023-02-16 RX ORDER — DIPHENHYDRAMINE HYDROCHLORIDE 50 MG/ML
50 INJECTION, SOLUTION INTRAMUSCULAR; INTRAVENOUS AS NEEDED
Start: 2023-03-02

## 2023-02-16 RX ORDER — SODIUM CHLORIDE 0.9 % (FLUSH) 0.9 %
5-40 SYRINGE (ML) INJECTION AS NEEDED
OUTPATIENT
Start: 2023-03-02

## 2023-02-16 RX ORDER — DIPHENHYDRAMINE HYDROCHLORIDE 50 MG/ML
50 INJECTION, SOLUTION INTRAMUSCULAR; INTRAVENOUS AS NEEDED
Start: 2023-02-23

## 2023-02-16 RX ORDER — DIPHENHYDRAMINE HYDROCHLORIDE 50 MG/ML
25 INJECTION, SOLUTION INTRAMUSCULAR; INTRAVENOUS AS NEEDED
Start: 2023-02-23

## 2023-02-16 RX ORDER — DIPHENHYDRAMINE HYDROCHLORIDE 50 MG/ML
50 INJECTION, SOLUTION INTRAMUSCULAR; INTRAVENOUS AS NEEDED
Start: 2023-03-09

## 2023-02-16 RX ORDER — HEPARIN 100 UNIT/ML
500 SYRINGE INTRAVENOUS AS NEEDED
Start: 2023-03-09

## 2023-02-16 RX ORDER — HYDROCORTISONE SODIUM SUCCINATE 100 MG/2ML
100 INJECTION, POWDER, FOR SOLUTION INTRAMUSCULAR; INTRAVENOUS AS NEEDED
OUTPATIENT
Start: 2023-03-23

## 2023-02-16 RX ORDER — ALBUTEROL SULFATE 0.83 MG/ML
2.5 SOLUTION RESPIRATORY (INHALATION) AS NEEDED
Start: 2023-03-09

## 2023-02-16 RX ORDER — ACETAMINOPHEN 325 MG/1
650 TABLET ORAL AS NEEDED
Start: 2023-03-02

## 2023-02-16 RX ORDER — ALBUTEROL SULFATE 0.83 MG/ML
2.5 SOLUTION RESPIRATORY (INHALATION) AS NEEDED
Start: 2023-02-23

## 2023-02-16 RX ORDER — DIPHENHYDRAMINE HYDROCHLORIDE 50 MG/ML
25 INJECTION, SOLUTION INTRAMUSCULAR; INTRAVENOUS AS NEEDED
Start: 2023-03-23

## 2023-02-16 RX ORDER — DIPHENHYDRAMINE HYDROCHLORIDE 50 MG/ML
50 INJECTION, SOLUTION INTRAMUSCULAR; INTRAVENOUS AS NEEDED
Start: 2023-03-16

## 2023-02-16 RX ORDER — SODIUM CHLORIDE 0.9 % (FLUSH) 0.9 %
5-40 SYRINGE (ML) INJECTION AS NEEDED
OUTPATIENT
Start: 2023-03-16

## 2023-02-16 RX ORDER — ONDANSETRON 2 MG/ML
8 INJECTION INTRAMUSCULAR; INTRAVENOUS AS NEEDED
OUTPATIENT
Start: 2023-02-23

## 2023-02-16 RX ORDER — ONDANSETRON 2 MG/ML
8 INJECTION INTRAMUSCULAR; INTRAVENOUS AS NEEDED
OUTPATIENT
Start: 2023-03-02

## 2023-02-16 RX ORDER — ALBUTEROL SULFATE 0.83 MG/ML
2.5 SOLUTION RESPIRATORY (INHALATION) AS NEEDED
Start: 2023-03-16

## 2023-02-16 RX ORDER — SODIUM CHLORIDE 0.9 % (FLUSH) 0.9 %
5-40 SYRINGE (ML) INJECTION AS NEEDED
OUTPATIENT
Start: 2023-02-23

## 2023-02-16 NOTE — TELEPHONE ENCOUNTER
[address] at [facility]  [phone]    Labs reviewed. Iron saturation has fallen, and ferritin has dropped to 12. No anemia, but HGB at the low end of normal.  She is symptomatic and would like to try some more IV iron based on our discussion yesterday. We will plan on venofer weekly x5 doses.

## 2023-02-16 NOTE — TELEPHONE ENCOUNTER
[address] at [place]  [phone]    02/16/23- Called pt unable to reach pt-left a v/m. Needing to advise pt the following below per MD.      Labs reviewed. Iron saturation has fallen, and ferritin has dropped to 12. No anemia, but HGB at the low end of normal.  She is symptomatic and would like to try some more IV iron based on our discussion yesterday. We will plan on venofer weekly x5 doses. 02/17/23- Informed pt of results above she verbalized understanding.

## 2023-02-23 ENCOUNTER — APPOINTMENT (OUTPATIENT)
Dept: GENERAL RADIOLOGY | Age: 28
End: 2023-02-23
Attending: EMERGENCY MEDICINE
Payer: COMMERCIAL

## 2023-02-23 ENCOUNTER — HOSPITAL ENCOUNTER (EMERGENCY)
Age: 28
Discharge: HOME | End: 2023-02-23
Attending: EMERGENCY MEDICINE
Payer: COMMERCIAL

## 2023-02-23 VITALS
DIASTOLIC BLOOD PRESSURE: 73 MMHG | HEART RATE: 98 BPM | HEIGHT: 61 IN | TEMPERATURE: 98.4 F | WEIGHT: 116 LBS | BODY MASS INDEX: 21.9 KG/M2 | SYSTOLIC BLOOD PRESSURE: 119 MMHG | RESPIRATION RATE: 16 BRPM | OXYGEN SATURATION: 100 %

## 2023-02-23 DIAGNOSIS — M79.605 LEFT LEG PAIN: Primary | ICD-10-CM
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
## 2023-02-23 PROCEDURE — 99283 EMERGENCY DEPT VISIT LOW MDM: CPT

## 2023-02-23 PROCEDURE — 73590 X-RAY EXAM OF LOWER LEG: CPT

## 2023-02-23 PROCEDURE — 74011250637 HC RX REV CODE- 250/637: Performed by: EMERGENCY MEDICINE

## 2023-02-23 RX ORDER — OXYCODONE AND ACETAMINOPHEN 5; 325 MG/1; MG/1
1 TABLET ORAL
Qty: 12 TABLET | Refills: 0 | Status: SHIPPED | OUTPATIENT
Start: 2023-02-23 | End: 2023-02-26

## 2023-02-23 RX ORDER — OXYCODONE AND ACETAMINOPHEN 5; 325 MG/1; MG/1
2 TABLET ORAL
Status: COMPLETED | OUTPATIENT
Start: 2023-02-23 | End: 2023-02-23

## 2023-02-23 RX ADMIN — OXYCODONE HYDROCHLORIDE AND ACETAMINOPHEN 2 TABLET: 5; 325 TABLET ORAL at 17:26
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
## 2023-02-23 NOTE — ED NOTES
Pt understanding of dc instructions medications and followup care, alert oriented and ambulatory to wheelchair at discharge
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
## 2023-02-23 NOTE — DISCHARGE INSTRUCTIONS
Thank you! Thank you for allowing me to care for you in the emergency department. It is my goal to provide you with excellent care. If you have not received excellent quality care, please ask to speak to the nurse manager. Please fill out the survey that will come to you by mail or email since we listen to your feedback! Below you will find a list of your tests from today's visit. Should you have any questions, please do not hesitate to call the emergency department. Labs  No results found for this or any previous visit (from the past 12 hour(s)). Radiologic Studies  XR TIB/FIB LT   Final Result   No acute abnormality. CT Results  (Last 48 hours)      None          CXR Results  (Last 48 hours)      None          ------------------------------------------------------------------------------------------------------------  The exam and treatment you received in the Emergency Department were for an urgent problem and are not intended as complete care. It is important that you follow-up with a doctor, nurse practitioner, or physician assistant to:  (1) confirm your diagnosis,  (2) re-evaluation of changes in your illness and treatment, and  (3) for ongoing care. Please take your discharge instructions with you when you go to your follow-up appointment. If you have any problem arranging a follow-up appointment, contact the Emergency Department. If your symptoms become worse or you do not improve as expected and you are unable to reach your health care provider, please return to the Emergency Department. We are available 24 hours a day. If a prescription has been provided, please have it filled as soon as possible to prevent a delay in treatment. If you have any questions or reservations about taking the medication due to side effects or interactions with other medications, please call your primary care provider or contact the ER.

## 2023-02-23 NOTE — Clinical Note
[facility] EMERGENCY DEPT  [address] [place] [id number]  [phone]    Work/School Note    Date: 2/23/2023    To Whom It May concern:    Dunia Matthews was seen and treated today in the emergency room by the following provider(s):  Attending Provider: Brennan Aguayo MD.      Dunia Matthews is excused from work/school on 2/23/2023 through 2/25/2023. She is medically clear to return to work/school on 2/26/2023.          Sincerely,          Richard Ortega MD

## 2023-03-01 ENCOUNTER — TRANSCRIBE ORDER (OUTPATIENT)
Dept: SCHEDULING | Age: 28
End: 2023-03-01

## 2023-03-01 ENCOUNTER — OFFICE VISIT (OUTPATIENT)
Dept: FAMILY MEDICINE CLINIC | Age: 28
End: 2023-03-01
Payer: COMMERCIAL

## 2023-03-01 VITALS
HEART RATE: 108 BPM | BODY MASS INDEX: 22.6 KG/M2 | OXYGEN SATURATION: 98 % | TEMPERATURE: 99.2 F | SYSTOLIC BLOOD PRESSURE: 110 MMHG | WEIGHT: 119.7 LBS | HEIGHT: 61 IN | DIASTOLIC BLOOD PRESSURE: 68 MMHG

## 2023-03-01 DIAGNOSIS — G44.319 ACUTE POST-TRAUMATIC HEADACHE, NOT INTRACTABLE: ICD-10-CM
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
## 2023-03-01 DIAGNOSIS — M54.2 NECK PAIN: ICD-10-CM

## 2023-03-01 DIAGNOSIS — G44.319 ACUTE POST-TRAUMATIC HEADACHE: ICD-10-CM
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
## 2023-03-01 DIAGNOSIS — S80.12XD CONTUSION OF LEFT LOWER EXTREMITY, SUBSEQUENT ENCOUNTER: ICD-10-CM

## 2023-03-01 PROCEDURE — 99214 OFFICE O/P EST MOD 30 MIN: CPT | Performed by: STUDENT IN AN ORGANIZED HEALTH CARE EDUCATION/TRAINING PROGRAM

## 2023-03-01 RX ORDER — DICLOFENAC SODIUM 75 MG/1
75 TABLET, DELAYED RELEASE ORAL 2 TIMES DAILY
Qty: 60 TABLET | Refills: 1 | Status: SHIPPED | OUTPATIENT
Start: 2023-03-01

## 2023-03-01 RX ORDER — CYCLOBENZAPRINE HCL 5 MG
5-10 TABLET ORAL
Qty: 30 TABLET | Refills: 1 | Status: SHIPPED | OUTPATIENT
Start: 2023-03-01
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
## 2023-03-02 ENCOUNTER — HOSPITAL ENCOUNTER (OUTPATIENT)
Dept: INFUSION THERAPY | Age: 28
Discharge: HOME | End: 2023-03-02
Payer: COMMERCIAL

## 2023-03-02 ENCOUNTER — HOSPITAL ENCOUNTER (OUTPATIENT)
Dept: CT IMAGING | Age: 28
Discharge: HOME | End: 2023-03-02
Attending: STUDENT IN AN ORGANIZED HEALTH CARE EDUCATION/TRAINING PROGRAM
Payer: COMMERCIAL

## 2023-03-02 VITALS
SYSTOLIC BLOOD PRESSURE: 106 MMHG | TEMPERATURE: 98.5 F | OXYGEN SATURATION: 98 % | RESPIRATION RATE: 16 BRPM | HEART RATE: 95 BPM | HEIGHT: 61 IN | WEIGHT: 118.6 LBS | DIASTOLIC BLOOD PRESSURE: 65 MMHG | BODY MASS INDEX: 22.39 KG/M2

## 2023-03-02 DIAGNOSIS — G44.319 ACUTE POST-TRAUMATIC HEADACHE, NOT INTRACTABLE: ICD-10-CM

## 2023-03-02 DIAGNOSIS — D50.9 IRON DEFICIENCY ANEMIA, UNSPECIFIED IRON DEFICIENCY ANEMIA TYPE: Primary | ICD-10-CM

## 2023-03-02 DIAGNOSIS — D50.0 IRON DEFICIENCY ANEMIA DUE TO CHRONIC BLOOD LOSS: ICD-10-CM

## 2023-03-02 DIAGNOSIS — M54.2 NECK PAIN: ICD-10-CM
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
## 2023-03-02 PROCEDURE — 70450 CT HEAD/BRAIN W/O DYE: CPT

## 2023-03-02 PROCEDURE — 72125 CT NECK SPINE W/O DYE: CPT

## 2023-03-02 PROCEDURE — 74011250636 HC RX REV CODE- 250/636: Performed by: NURSE PRACTITIONER

## 2023-03-02 PROCEDURE — 96374 THER/PROPH/DIAG INJ IV PUSH: CPT

## 2023-03-02 PROCEDURE — 74011000258 HC RX REV CODE- 258: Performed by: NURSE PRACTITIONER

## 2023-03-02 RX ORDER — SODIUM CHLORIDE 9 MG/ML
5-250 INJECTION, SOLUTION INTRAVENOUS AS NEEDED
Status: DISCONTINUED | OUTPATIENT
Start: 2023-03-02 | End: 2023-03-03 | Stop reason: HOSPADM

## 2023-03-02 RX ADMIN — SODIUM CHLORIDE 25 ML/HR: 9 INJECTION, SOLUTION INTRAVENOUS at 15:33

## 2023-03-02 RX ADMIN — IRON SUCROSE 200 MG: 20 INJECTION, SOLUTION INTRAVENOUS at 15:44

## 2023-03-02 NOTE — PROGRESS NOTES
Outpatient [facility]fusion [facility] Short Visit Progress Note    Ms. Jessy Delgadillo admitted to [facility] for Venofer (1of5) ambulatory in stable condition. Assessment completed. No new concerns voiced. LAC 24G PIV placed with +blood return. No lab order for today treatment. Vital Signs:  Visit Vitals  /65   Pulse 95   Temp 98.5 °F (36.9 °C)   Resp 16   Ht 5' 1\" (1.549 m)   Wt 53.8 kg (118 lb 9.6 oz)   LMP 02/07/2023 (Exact Date)   SpO2 98%   Breastfeeding No   BMI 22.41 kg/m²         Medications:  Medications Administered       0.9% sodium chloride infusion       Admin Date  03/02/2023 Action  New Bag Dose  25 mL/hr Rate  25 mL/hr Route  IntraVENous Administered By  Trini Fritz RN              iron sucrose (VENOFER) 200 mg in 0.9% sodium chloride 100 mL, overfill volume 10 mL IVPB       Admin Date  03/02/2023 Action  New Bag Dose  200 mg Rate  480 mL/hr Route  IntraVENous Administered By  Trini Fritz RN                      AVS declined. Patient tolerated treatment well. PIV flushed and removed per protocol. Patient discharged from [place] [address] ambulatory in no distress at 1605. Patient will be back on 03/09/23 at 1530 of next appointment.     Alvina Cabrera RN  March 2, 2023    Future Appointments   Date Time Provider Samira Duggan   3/2/2023  5:30 PM [facility] CT 1 [facility]   3/9/2023  3:30 PM SS INF3 CH4 <2H [facility]   3/15/2023 11:00 AM Hannah Scott NP IFP BS AMB   3/16/2023  3:30 PM SS INF1 CH4 <2H [facility]   3/23/2023  3:30 PM SS INF3 CH4 <2H [facility]   3/24/2023 11:00 AM DILIP RODRIGUEZF BS AMB   3/30/2023  3:30 PM SS INF2 CH4 <2H [facility]   4/20/2023  1:20 PM Jesus Lo MD CAVSF BS AMB   2/14/2024  1:00 PM Brian Mccurdy MD ONCSF BS AMB

## 2023-03-09 ENCOUNTER — HOSPITAL ENCOUNTER (OUTPATIENT)
Dept: INFUSION THERAPY | Age: 28
Discharge: HOME | End: 2023-03-09
Payer: COMMERCIAL

## 2023-03-09 VITALS
DIASTOLIC BLOOD PRESSURE: 56 MMHG | SYSTOLIC BLOOD PRESSURE: 111 MMHG | OXYGEN SATURATION: 97 % | TEMPERATURE: 98 F | HEART RATE: 107 BPM | RESPIRATION RATE: 16 BRPM

## 2023-03-09 DIAGNOSIS — D50.0 IRON DEFICIENCY ANEMIA DUE TO CHRONIC BLOOD LOSS: Primary | ICD-10-CM

## 2023-03-09 PROCEDURE — 74011000258 HC RX REV CODE- 258: Performed by: NURSE PRACTITIONER

## 2023-03-09 PROCEDURE — 74011250636 HC RX REV CODE- 250/636: Performed by: NURSE PRACTITIONER

## 2023-03-09 PROCEDURE — 96374 THER/PROPH/DIAG INJ IV PUSH: CPT

## 2023-03-09 RX ORDER — SODIUM CHLORIDE 9 MG/ML
5-250 INJECTION, SOLUTION INTRAVENOUS AS NEEDED
Status: DISCONTINUED | OUTPATIENT
Start: 2023-03-09 | End: 2023-03-10 | Stop reason: HOSPADM

## 2023-03-09 RX ADMIN — SODIUM CHLORIDE 25 ML/HR: 9 INJECTION, SOLUTION INTRAVENOUS at 15:48

## 2023-03-09 RX ADMIN — IRON SUCROSE 200 MG: 20 INJECTION, SOLUTION INTRAVENOUS at 15:48

## 2023-03-09 NOTE — PROGRESS NOTES
[facility] Progress Note    Date: 2023    Name: Negrito Metcalf    MRN: [id number]         : 1995    Ms. Manjit Espinoza Arrived ambulatory and in no distress for Venofer Infusion. Assessment was completed, no acute issues at this time, no new complaints voiced. 24 G PIV established to left arm, + blood return. Ms. Cb Wright vitals were reviewed. Visit Vitals  BP (!) 115/57   Pulse (!) 107   Temp 98 °F (36.7 °C)   Resp 16   SpO2 97%       Medications:  Medications Administered       0.9% sodium chloride infusion       Admin Date  2023 Action  New Bag Dose  25 mL/hr Rate  25 mL/hr Route  IntraVENous Administered By  Emmett Gallegos RN              iron sucrose (VENOFER) 200 mg in 0.9% sodium chloride 100 mL, overfill volume 10 mL IVPB       Admin Date  2023 Action  New Bag Dose  200 mg Rate  480 mL/hr Route  IntraVENous Administered By  Emmett Gallegos RN                     Ms. Manjit Espinoza tolerated treatment well and was discharged from [place] [address] in stable condition. PIV flushed & removed. She is to return on  at 1530 for her next appointment.     Jocelyne Hernandez RN  2023

## 2023-03-13 ENCOUNTER — VIRTUAL VISIT (OUTPATIENT)
Dept: FAMILY MEDICINE CLINIC | Age: 28
End: 2023-03-13
Payer: COMMERCIAL

## 2023-03-13 ENCOUNTER — HOSPITAL ENCOUNTER (OUTPATIENT)
Dept: GENERAL RADIOLOGY | Age: 28
Discharge: HOME | End: 2023-03-13
Attending: STUDENT IN AN ORGANIZED HEALTH CARE EDUCATION/TRAINING PROGRAM
Payer: COMMERCIAL
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
## 2023-03-13 DIAGNOSIS — Z02.89 ENCOUNTER FOR COMPLETION OF FORM WITH PATIENT: ICD-10-CM

## 2023-03-13 DIAGNOSIS — M25.572 ACUTE LEFT ANKLE PAIN: ICD-10-CM
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
## 2023-03-13 DIAGNOSIS — M79.672 ACUTE FOOT PAIN, LEFT: ICD-10-CM

## 2023-03-13 PROCEDURE — 73630 X-RAY EXAM OF FOOT: CPT

## 2023-03-13 PROCEDURE — 99214 OFFICE O/P EST MOD 30 MIN: CPT | Performed by: STUDENT IN AN ORGANIZED HEALTH CARE EDUCATION/TRAINING PROGRAM

## 2023-03-13 PROCEDURE — 73610 X-RAY EXAM OF ANKLE: CPT
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
## 2023-03-15 ENCOUNTER — OFFICE VISIT (OUTPATIENT)
Dept: FAMILY MEDICINE CLINIC | Age: 28
End: 2023-03-15

## 2023-03-15 VITALS
HEIGHT: 61 IN | RESPIRATION RATE: 16 BRPM | SYSTOLIC BLOOD PRESSURE: 114 MMHG | WEIGHT: 119.6 LBS | BODY MASS INDEX: 22.58 KG/M2 | TEMPERATURE: 98.4 F | HEART RATE: 101 BPM | OXYGEN SATURATION: 100 % | DIASTOLIC BLOOD PRESSURE: 75 MMHG

## 2023-03-15 DIAGNOSIS — E55.9 VITAMIN D DEFICIENCY: ICD-10-CM

## 2023-03-15 DIAGNOSIS — R63.4 UNINTENDED WEIGHT LOSS: Primary | ICD-10-CM

## 2023-03-15 DIAGNOSIS — R79.89 ABNORMAL TSH: ICD-10-CM

## 2023-03-15 NOTE — PROGRESS NOTES
Noel Torre (: 1995) is a 32 y.o. female, established patient, here for evaluation of the following chief complaint(s):  Weight Loss (X few months. /Has a good appetite just hasn't been able to gain any weight. )       ASSESSMENT/PLAN:  Below is the assessment and plan developed based on review of pertinent history, physical exam, labs, studies, and medications. 1. Unintended weight loss  3 lb weight gain over the past month  Recommended increasing protein and calories, she is very physically active at her job, needs to increase intake to avoid calorie deficit and further weight loss  -     CBC W/O DIFF; Future  -     METABOLIC PANEL, COMPREHENSIVE; Future    2. Abnormal TSH  TSH slightly below normal with normal free T4 at last check  Repeat TSH and free T4, check thyroid antibody panel  -     TSH 3RD GENERATION; Future  -     T4, FREE; Future  -     THYROID ANTIBODY PANEL; Future    3. Vitamin D deficiency  Good compliance with supplement  Repeat vitamin D level today  -     VITAMIN D, 25 HYDROXY; Future      Return in about 3 months (around 6/15/2023), or if symptoms worsen or fail to improve, for follow up weight loss. I have discussed the diagnosis with the patient and the intended plan as seen in the above orders. The patient has received an after-visit summary and questions were answered concerning future plans. Patient conveyed understanding of the plan at the time of the visit. SUBJECTIVE/OBJECTIVE:  Presents for follow-up of unintended weight loss. First presented to [facility] with complaints of unintended weight loss in the office in May 2022. Lab work-up was done and revealed TSH just below normal with normal free T4. Anemia/iron deficiency was also noted. White blood cell count, metabolic panel, were normal.  HIV test was negative. 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     Date Weight  7/22              125 lbs  8/22              120 lbs  2/15/23         116 lbs  3/2/23           118 lbs  3/15/23         119 lbs    Reports appetite is good. Typically eats zavala eggs and toast for breakfast, sandwich for lunch, and a meat, bread, and vegetables at dinner. Good intake of protein and dairy products. No abdominal pain. No difficulty swallowing. No diarrhea or constipation. No blood or mucus in stool. Seeing hematology for ongoing management of iron deficiency and fatigue. Current iron infusions in the past.    Past Medical History:   Diagnosis Date    Anemia     Murmur     Valvular heart disease      . Past Surgical History:   Procedure Laterality Date    ME UNLISTED PROCEDURE CARDIAC SURGERY       Family History   Problem Relation Age of Onset    Cancer Father         LYMPHOMA    Heart Disease Father     Other Mother     OSTEOARTHRITIS Mother     Kidney Disease Mother         ANKYLOSING SPONDYLITIS    Diabetes Maternal Grandmother     Hypertension Maternal Grandmother     Kidney Disease Maternal Grandmother     Cancer Maternal Aunt         LUNG CA - SMOKER    Other Maternal Aunt         SARCOIDOSIS    Anesth Problems Neg Hx      Social History     Tobacco Use    Smoking status: Never    Smokeless tobacco: Never   Substance Use Topics    Alcohol use: No     Comment: rarely         Review of Systems   Constitutional:  Positive for fatigue and unexpected weight change. HENT: Negative. Eyes: Negative. Respiratory: Negative. Cardiovascular: Negative. Endocrine: Negative. Genitourinary: Negative. Musculoskeletal: Negative. Allergic/Immunologic: Negative. Neurological: Negative. Hematological: Negative.       Visit Vitals  /75 (BP 1 Location: Left upper arm, BP Patient Position: Sitting)   Pulse (!) 101   Temp 98.4 °F (36.9 °C) (Oral)   Resp 16   Ht 5' 1\" (1.549 m)   Wt 119 lb 9.6 oz (54.3 kg)   SpO2 100%   BMI 22.60 kg/m² Physical Examination: General appearance - alert, well appearing, and in no distress and oriented to person, place, and time  Mental status - normal mood, behavior, speech, dress, motor activity, and thought processes  Eyes - sclera anicteric  Neck - supple, no significant adenopathy, thyroid exam: thyroid is normal in size without nodules or tenderness  Chest - clear to auscultation, no wheezes, rales or rhonchi, symmetric air entry  Heart - normal rate, regular rhythm, normal S1, S2, no murmurs, rubs, clicks or gallops, normal bilateral carotid upstroke without bruits, no JVD  Abdomen - soft, nontender, nondistended, no masses or organomegaly  bowel sounds normal  Neurological - alert, oriented, normal speech, no focal findings or movement disorder noted  Extremities - no pedal edema noted, intact peripheral pulses, no edema, redness or tenderness in the calves or thighs  Skin - normal coloration and turgor, no rashes      An electronic signature was used to authenticate this note.   -- Nadege Ramesh NP   3/15/2023

## 2023-03-15 NOTE — PROGRESS NOTES
Silvia Mitchell is a 32 y.o. female , id x 2(name and ). Reviewed record, history, and  medications. Chief Complaint   Patient presents with    Weight Loss     X few months. Has a good appetite just hasn't been able to gain any weight. Vitals:    03/15/23 1107   BP: 114/75   Pulse: (!) 101   Resp: 16   Temp: 98.4 °F (36.9 °C)   TempSrc: Oral   SpO2: 100%   Weight: 119 lb 9.6 oz (54.3 kg)   Height: 5' 1\" (1.549 m)       Coordination of Care Questionnaire:   1. Have you been to the ER, urgent care clinic since your last visit? Hospitalized since your last visit? No    2. Have you seen or consulted any other health care providers outside of the [address] [place] [address] since your last visit? Include any pap smears or colon screening.  No

## 2023-03-16 ENCOUNTER — HOSPITAL ENCOUNTER (OUTPATIENT)
Dept: INFUSION THERAPY | Age: 28
Discharge: HOME | End: 2023-03-16
Payer: COMMERCIAL

## 2023-03-16 VITALS
DIASTOLIC BLOOD PRESSURE: 67 MMHG | TEMPERATURE: 98 F | RESPIRATION RATE: 17 BRPM | OXYGEN SATURATION: 100 % | SYSTOLIC BLOOD PRESSURE: 105 MMHG | HEART RATE: 88 BPM

## 2023-03-16 DIAGNOSIS — D50.0 IRON DEFICIENCY ANEMIA DUE TO CHRONIC BLOOD LOSS: Primary | ICD-10-CM

## 2023-03-16 LAB
25(OH)D3 SERPL-MCNC: 47.3 NG/ML (ref 30–100)
ALBUMIN SERPL-MCNC: 3.7 G/DL (ref 3.5–5)
ALBUMIN/GLOB SERPL: 1 (ref 1.1–2.2)
ALP SERPL-CCNC: 53 U/L (ref 45–117)
ALT SERPL-CCNC: 17 U/L (ref 12–78)
ANION GAP SERPL CALC-SCNC: 6 MMOL/L (ref 5–15)
AST SERPL-CCNC: 13 U/L (ref 15–37)
BILIRUB SERPL-MCNC: 0.3 MG/DL (ref 0.2–1)
BUN SERPL-MCNC: 8 MG/DL (ref 6–20)
BUN/CREAT SERPL: 11 (ref 12–20)
CALCIUM SERPL-MCNC: 9.1 MG/DL (ref 8.5–10.1)
CHLORIDE SERPL-SCNC: 106 MMOL/L (ref 97–108)
CO2 SERPL-SCNC: 27 MMOL/L (ref 21–32)
CREAT SERPL-MCNC: 0.73 MG/DL (ref 0.55–1.02)
ERYTHROCYTE [DISTWIDTH] IN BLOOD BY AUTOMATED COUNT: 15.5 % (ref 11.5–14.5)
GLOBULIN SER CALC-MCNC: 3.6 G/DL (ref 2–4)
GLUCOSE SERPL-MCNC: 87 MG/DL (ref 65–100)
HCT VFR BLD AUTO: 41.2 % (ref 35–47)
HGB BLD-MCNC: 12.1 G/DL (ref 11.5–16)
MCH RBC QN AUTO: 21.8 PG (ref 26–34)
MCHC RBC AUTO-ENTMCNC: 29.4 G/DL (ref 30–36.5)
MCV RBC AUTO: 74.2 FL (ref 80–99)
NRBC # BLD: 0 K/UL (ref 0–0.01)
NRBC BLD-RTO: 0 PER 100 WBC
PLATELET # BLD AUTO: 239 K/UL (ref 150–400)
PMV BLD AUTO: 11.9 FL (ref 8.9–12.9)
POTASSIUM SERPL-SCNC: 3.7 MMOL/L (ref 3.5–5.1)
PROT SERPL-MCNC: 7.3 G/DL (ref 6.4–8.2)
RBC # BLD AUTO: 5.55 M/UL (ref 3.8–5.2)
SODIUM SERPL-SCNC: 139 MMOL/L (ref 136–145)
T4 FREE SERPL-MCNC: 0.9 NG/DL (ref 0.8–1.5)
TSH SERPL DL<=0.05 MIU/L-ACNC: 0.37 UIU/ML (ref 0.36–3.74)
WBC # BLD AUTO: 3.9 K/UL (ref 3.6–11)

## 2023-03-16 PROCEDURE — 96374 THER/PROPH/DIAG INJ IV PUSH: CPT

## 2023-03-16 PROCEDURE — 74011250636 HC RX REV CODE- 250/636: Performed by: NURSE PRACTITIONER

## 2023-03-16 PROCEDURE — 74011000258 HC RX REV CODE- 258: Performed by: NURSE PRACTITIONER

## 2023-03-16 RX ORDER — SODIUM CHLORIDE 9 MG/ML
5-250 INJECTION, SOLUTION INTRAVENOUS AS NEEDED
Status: DISCONTINUED | OUTPATIENT
Start: 2023-03-16 | End: 2023-03-17 | Stop reason: HOSPADM

## 2023-03-16 RX ADMIN — SODIUM CHLORIDE 25 ML/HR: 9 INJECTION, SOLUTION INTRAVENOUS at 16:10

## 2023-03-16 RX ADMIN — IRON SUCROSE 200 MG: 20 INJECTION, SOLUTION INTRAVENOUS at 16:10

## 2023-03-16 NOTE — PROGRESS NOTES
Outpatient Infusion Center Short Visit Progress Note    Ms. Rashid Ahn admitted to [facility] for Venofer ambulatory in stable condition. Assessment completed. No new concerns voiced. Left PIV established with blood return. Vital Signs:  Visit Vitals  /67   Pulse 88   Temp 98 °F (36.7 °C)   Resp 17   SpO2 100%       Medications:  Medications Administered       0.9% sodium chloride infusion       Admin Date  03/16/2023 Action  New Bag Dose  25 mL/hr Rate  25 mL/hr Route  IntraVENous Administered By  Tye Edwards              iron sucrose (VENOFER) 200 mg in 0.9% sodium chloride 100 mL, overfill volume 10 mL IVPB       Admin Date  03/16/2023 Action  New Bag Dose  200 mg Rate  480 mL/hr Route  IntraVENous Administered By  Tye Edwards                      refused AVS    Patient tolerated treatment well. PIV flushed and removed. Patient discharged from [place] [address] ambulatory in no distress at 1635. Patient aware of next appointment.     Nereida Ellernuno  March 16, 2023    Future Appointments   Date Time Provider Samira Duggan   3/23/2023  3:30 PM SS [facility]F3 [facility]   3/24/2023 11:00 AM DILIP RODRIGUEZF BS AMB   3/30/2023  3:30 [facility]   4/20/2023  1:20 PM Salome Lo MD CAVSF BS AMB   6/16/2023  2:20 PM Raj Kearney NP IFP BS AMB   2/14/2024  1:00 PM Ivet Mccurdy MD ONCSF BS AMB

## 2023-03-17 ENCOUNTER — TELEPHONE (OUTPATIENT)
Dept: CARDIOLOGY CLINIC | Age: 28
End: 2023-03-17

## 2023-03-17 DIAGNOSIS — R06.02 SOB (SHORTNESS OF BREATH): ICD-10-CM

## 2023-03-17 DIAGNOSIS — Z98.890 S/P MVR (MITRAL VALVE REPAIR): Primary | ICD-10-CM

## 2023-03-17 DIAGNOSIS — R07.9 CHEST PAIN, UNSPECIFIED TYPE: ICD-10-CM

## 2023-03-17 NOTE — TELEPHONE ENCOUNTER
Please provide an order fo patients scheduled echocardiogram on 3/24/2023 at 11:00.      Thank you,     Ac Chamorro

## 2023-03-18 LAB
THYROGLOB AB SERPL-ACNC: <1 IU/ML (ref 0–0.9)
THYROPEROXIDASE AB SERPL-ACNC: 12 IU/ML (ref 0–34)

## 2023-03-23 ENCOUNTER — HOSPITAL ENCOUNTER (OUTPATIENT)
Dept: INFUSION THERAPY | Age: 28
Discharge: HOME | End: 2023-03-23
Payer: COMMERCIAL

## 2023-03-23 VITALS
HEART RATE: 97 BPM | SYSTOLIC BLOOD PRESSURE: 104 MMHG | TEMPERATURE: 99 F | DIASTOLIC BLOOD PRESSURE: 60 MMHG | OXYGEN SATURATION: 98 % | RESPIRATION RATE: 18 BRPM

## 2023-03-23 DIAGNOSIS — D50.0 IRON DEFICIENCY ANEMIA DUE TO CHRONIC BLOOD LOSS: Primary | ICD-10-CM

## 2023-03-23 PROCEDURE — 96374 THER/PROPH/DIAG INJ IV PUSH: CPT

## 2023-03-23 PROCEDURE — 74011250636 HC RX REV CODE- 250/636: Performed by: NURSE PRACTITIONER

## 2023-03-23 PROCEDURE — 74011000258 HC RX REV CODE- 258: Performed by: NURSE PRACTITIONER

## 2023-03-23 RX ORDER — SODIUM CHLORIDE 9 MG/ML
5-250 INJECTION, SOLUTION INTRAVENOUS AS NEEDED
Status: DISCONTINUED | OUTPATIENT
Start: 2023-03-23 | End: 2023-03-24 | Stop reason: HOSPADM

## 2023-03-23 RX ADMIN — SODIUM CHLORIDE 25 ML/HR: 9 INJECTION, SOLUTION INTRAVENOUS at 16:00

## 2023-03-23 RX ADMIN — IRON SUCROSE 200 MG: 20 INJECTION, SOLUTION INTRAVENOUS at 16:01

## 2023-03-23 NOTE — PROGRESS NOTES
[facility] Progress Note    Date: 2023    Name: Star Mitchell    MRN: [id number]         : 1995    Ms. Alondra Calixto Arrived ambulatory and in no distress for  Day 4 Venofer Infusion. Assessment was completed, no acute issues at this time, no new complaints voiced. 24 G PIV established to left arm, + blood return. Ms. Ming Bentley vitals were reviewed. Visit Vitals  /60   Pulse 97   Temp 99 °F (37.2 °C)   Resp 18   SpO2 98%       Medications:  Medications Administered       0.9% sodium chloride infusion       Admin Date  2023 Action  New Bag Dose  25 mL/hr Rate  25 mL/hr Route  IntraVENous Administered By  Tomas Land RN              iron sucrose (VENOFER) 200 mg in 0.9% sodium chloride 100 mL, overfill volume 10 mL IVPB       Admin Date  2023 Action  New Bag Dose  200 mg Rate  480 mL/hr Route  IntraVENous Administered By  Tomas Land RN                     Ms. Alondra Calixto tolerated treatment well and was discharged from [place] [address] in stable condition at 1625. PIV flushed & removed. She is to return on  at 1530 for her next appointment.     Violetta Aburto RN  2023

## 2023-03-24 ENCOUNTER — ANCILLARY PROCEDURE (OUTPATIENT)
Dept: CARDIOLOGY CLINIC | Age: 28
End: 2023-03-24

## 2023-03-24 VITALS
BODY MASS INDEX: 22.28 KG/M2 | WEIGHT: 118 LBS | HEIGHT: 61 IN | SYSTOLIC BLOOD PRESSURE: 110 MMHG | DIASTOLIC BLOOD PRESSURE: 62 MMHG

## 2023-03-24 DIAGNOSIS — R07.9 CHEST PAIN, UNSPECIFIED TYPE: ICD-10-CM

## 2023-03-24 DIAGNOSIS — Z98.890 S/P MVR (MITRAL VALVE REPAIR): ICD-10-CM

## 2023-03-24 DIAGNOSIS — R06.02 SOB (SHORTNESS OF BREATH): ICD-10-CM

## 2023-03-24 LAB
ECHO AO ROOT DIAM: 2.5 CM
ECHO AO ROOT INDEX: 1.66 CM/M2
ECHO LA DIAMETER INDEX: 2.25 CM/M2
ECHO LA DIAMETER: 3.4 CM
ECHO LA TO AORTIC ROOT RATIO: 1.36
ECHO LA VOL 2C: 32 ML (ref 22–52)
ECHO LA VOL 4C: 54 ML (ref 22–52)
ECHO LA VOL BP: 42 ML (ref 22–52)
ECHO LA VOL/BSA BIPLANE: 28 ML/M2 (ref 16–34)
ECHO LA VOLUME AREA LENGTH: 44 ML
ECHO LA VOLUME INDEX A2C: 21 ML/M2 (ref 16–34)
ECHO LA VOLUME INDEX A4C: 36 ML/M2 (ref 16–34)
ECHO LA VOLUME INDEX AREA LENGTH: 29 ML/M2 (ref 16–34)
ECHO LV E' LATERAL VELOCITY: 10 CM/S
ECHO LV E' SEPTAL VELOCITY: 9 CM/S
ECHO LV EDV A4C: 64 ML
ECHO LV EDV INDEX A4C: 42 ML/M2
ECHO LV EJECTION FRACTION A4C: 58 %
ECHO LV ESV A4C: 27 ML
ECHO LV ESV INDEX A4C: 18 ML/M2
ECHO LV FRACTIONAL SHORTENING: 30 % (ref 28–44)
ECHO LV INTERNAL DIMENSION DIASTOLE INDEX: 2.85 CM/M2
ECHO LV INTERNAL DIMENSION DIASTOLIC: 4.3 CM (ref 3.9–5.3)
ECHO LV INTERNAL DIMENSION SYSTOLIC INDEX: 1.99 CM/M2
ECHO LV INTERNAL DIMENSION SYSTOLIC: 3 CM
ECHO LV IVSD: 0.5 CM (ref 0.6–0.9)
ECHO LV MASS 2D: 58.3 G (ref 67–162)
ECHO LV MASS INDEX 2D: 38.6 G/M2 (ref 43–95)
ECHO LV POSTERIOR WALL DIASTOLIC: 0.5 CM (ref 0.6–0.9)
ECHO LV RELATIVE WALL THICKNESS RATIO: 0.23
ECHO MV A VELOCITY: 1.98 M/S
ECHO MV AREA PHT: 3.1 CM2
ECHO MV E DECELERATION TIME (DT): 242.7 MS
ECHO MV E VELOCITY: 1.92 M/S
ECHO MV E/A RATIO: 0.97
ECHO MV E/E' LATERAL: 19.2
ECHO MV E/E' RATIO (AVERAGED): 20.27
ECHO MV E/E' SEPTAL: 21.33
ECHO MV MAX VELOCITY: 2.2 M/S
ECHO MV MEAN GRADIENT: 10 MMHG
ECHO MV MEAN VELOCITY: 1.5 M/S
ECHO MV PEAK GRADIENT: 19 MMHG
ECHO MV PRESSURE HALF TIME (PHT): 70.4 MS
ECHO MV VTI: 46.8 CM
ECHO RV FREE WALL PEAK S': 10 CM/S
ECHO RV INTERNAL DIMENSION: 3.7 CM
ECHO RV TAPSE: 1.6 CM (ref 1.7–?)

## 2023-03-30 ENCOUNTER — HOSPITAL ENCOUNTER (OUTPATIENT)
Dept: INFUSION THERAPY | Age: 28
Discharge: HOME | End: 2023-03-30
Payer: COMMERCIAL

## 2023-03-30 VITALS
SYSTOLIC BLOOD PRESSURE: 108 MMHG | WEIGHT: 120.1 LBS | OXYGEN SATURATION: 100 % | DIASTOLIC BLOOD PRESSURE: 61 MMHG | RESPIRATION RATE: 17 BRPM | HEART RATE: 88 BPM | BODY MASS INDEX: 22.68 KG/M2 | HEIGHT: 61 IN | TEMPERATURE: 98.8 F

## 2023-03-30 DIAGNOSIS — D50.0 IRON DEFICIENCY ANEMIA DUE TO CHRONIC BLOOD LOSS: Primary | ICD-10-CM

## 2023-03-30 PROCEDURE — 74011250636 HC RX REV CODE- 250/636: Performed by: NURSE PRACTITIONER

## 2023-03-30 PROCEDURE — 74011000258 HC RX REV CODE- 258: Performed by: NURSE PRACTITIONER

## 2023-03-30 PROCEDURE — 96374 THER/PROPH/DIAG INJ IV PUSH: CPT

## 2023-03-30 RX ORDER — SODIUM CHLORIDE 9 MG/ML
5-250 INJECTION, SOLUTION INTRAVENOUS AS NEEDED
Status: DISCONTINUED | OUTPATIENT
Start: 2023-03-30 | End: 2023-03-31 | Stop reason: HOSPADM

## 2023-03-30 RX ADMIN — SODIUM CHLORIDE 25 ML/HR: 9 INJECTION, SOLUTION INTRAVENOUS at 16:10

## 2023-03-30 RX ADMIN — IRON SUCROSE 200 MG: 20 INJECTION, SOLUTION INTRAVENOUS at 16:10

## 2023-03-30 NOTE — PROGRESS NOTES
Outpatient Infusion Center Short Visit Progress Note    Ms. Stefanie Walters admitted to [facility] for Venofer ambulatory in stable condition. Assessment completed. No new concerns voiced. Left PIV established with blood return. Vital Signs:  Visit Vitals  /61   Pulse 88   Temp 98.8 °F (37.1 °C)   Resp 17   Ht 5' 1\" (1.549 m)   Wt 54.5 kg (120 lb 1.6 oz)   SpO2 100%   BMI 22.69 kg/m²       Medications:  Medications Administered       0.9% sodium chloride infusion       Admin Date  03/30/2023 Action  New Bag Dose  25 mL/hr Rate  25 mL/hr Route  IntraVENous Administered By  Barbara Patton              iron sucrose (VENOFER) 200 mg in 0.9% sodium chloride 100 mL, overfill volume 10 mL IVPB       Admin Date  03/30/2023 Action  New Bag Dose  200 mg Rate  480 mL/hr Route  IntraVENous Administered By  Barbara Patton                      refused AVS    Patient tolerated treatment well. PIV flushed and removed. Patient discharged from [place] [address] ambulatory in no distress at 1635. Patient aware of next appointment.     Tennis Alesia  March 30, 2023    Future Appointments   Date Time Provider Samira Duggan   4/20/2023  1:20 PM Johanny Norris MD CAVSF BS AMB   6/16/2023  2:20 PM Miriam Monson NP IFP BS AMB   2/14/2024  1:00 PM Pia Mccurdy MD ONCSF BS AMB

## 2023-04-21 ENCOUNTER — DOCUMENTATION ONLY (OUTPATIENT)
Dept: FAMILY MEDICINE CLINIC | Age: 28
End: 2023-04-21

## 2023-04-21 NOTE — PROGRESS NOTES
[id number] [facility] request for itemized statement rec'd and processed. Faxed to [phone]  Affidavit attached. Confirmation rec'd . Request /copy of affidavit will be scanned into chart.

## 2023-04-22 DIAGNOSIS — D50.9 IRON DEFICIENCY ANEMIA, UNSPECIFIED IRON DEFICIENCY ANEMIA TYPE: Primary | ICD-10-CM
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
## 2023-04-23 DIAGNOSIS — D50.9 IRON DEFICIENCY ANEMIA, UNSPECIFIED IRON DEFICIENCY ANEMIA TYPE: Primary | ICD-10-CM

## 2023-04-24 DIAGNOSIS — D50.9 IRON DEFICIENCY ANEMIA, UNSPECIFIED IRON DEFICIENCY ANEMIA TYPE: Primary | ICD-10-CM

## 2023-06-23 ENCOUNTER — CLINICAL DOCUMENTATION (OUTPATIENT)
Age: 28
End: 2023-06-23

## 2023-06-23 NOTE — PROGRESS NOTES
[id number] [facility] request for medical records was faxed to [facility] [phone] on 6/20/23 to be processed.

## 2023-07-11 ENCOUNTER — CLINICAL DOCUMENTATION (OUTPATIENT)
Age: 28
End: 2023-07-11

## 2024-01-10 ENCOUNTER — HOSPITAL ENCOUNTER (EMERGENCY)
Facility: HOSPITAL | Age: 29
Discharge: HOME | End: 2024-01-10
Attending: EMERGENCY MEDICINE
Payer: COMMERCIAL

## 2024-01-10 VITALS
HEIGHT: 61 IN | BODY MASS INDEX: 22.66 KG/M2 | WEIGHT: 120 LBS | RESPIRATION RATE: 18 BRPM | OXYGEN SATURATION: 100 % | SYSTOLIC BLOOD PRESSURE: 119 MMHG | DIASTOLIC BLOOD PRESSURE: 79 MMHG | TEMPERATURE: 98.3 F | HEART RATE: 92 BPM

## 2024-01-10 DIAGNOSIS — R19.7 DIARRHEA, UNSPECIFIED TYPE: Primary | ICD-10-CM

## 2024-01-10 LAB — HCG UR QL: NEGATIVE

## 2024-01-10 PROCEDURE — 81025 URINE PREGNANCY TEST: CPT

## 2024-01-10 PROCEDURE — 6370000000 HC RX 637 (ALT 250 FOR IP): Performed by: EMERGENCY MEDICINE

## 2024-01-10 PROCEDURE — 99283 EMERGENCY DEPT VISIT LOW MDM: CPT

## 2024-01-10 RX ORDER — ONDANSETRON 4 MG/1
4 TABLET, ORALLY DISINTEGRATING ORAL 3 TIMES DAILY PRN
Qty: 21 TABLET | Refills: 0 | Status: SHIPPED | OUTPATIENT
Start: 2024-01-10

## 2024-01-10 RX ORDER — ONDANSETRON 4 MG/1
4 TABLET, ORALLY DISINTEGRATING ORAL
Status: COMPLETED | OUTPATIENT
Start: 2024-01-10 | End: 2024-01-10

## 2024-01-10 RX ADMIN — ONDANSETRON 4 MG: 4 TABLET, ORALLY DISINTEGRATING ORAL at 08:38

## 2024-01-10 ASSESSMENT — PAIN DESCRIPTION - DESCRIPTORS: DESCRIPTORS: CRAMPING

## 2024-01-10 ASSESSMENT — PAIN DESCRIPTION - ORIENTATION: ORIENTATION: MID

## 2024-01-10 ASSESSMENT — PAIN SCALES - GENERAL: PAINLEVEL_OUTOF10: 5

## 2024-01-10 ASSESSMENT — ENCOUNTER SYMPTOMS
VOMITING: 0
SORE THROAT: 0
COUGH: 0

## 2024-01-10 ASSESSMENT — LIFESTYLE VARIABLES
HOW OFTEN DO YOU HAVE A DRINK CONTAINING ALCOHOL: NEVER
HOW MANY STANDARD DRINKS CONTAINING ALCOHOL DO YOU HAVE ON A TYPICAL DAY: PATIENT DOES NOT DRINK

## 2024-01-10 ASSESSMENT — PAIN DESCRIPTION - LOCATION: LOCATION: ABDOMEN

## 2024-01-10 ASSESSMENT — PAIN - FUNCTIONAL ASSESSMENT: PAIN_FUNCTIONAL_ASSESSMENT: 0-10

## 2024-01-10 NOTE — ED TRIAGE NOTES
Patient arrives to ed via pov with c/o abdominal gas pains since last friday, diarrhea since Saturday, and nausea that started today after being on clindamycin for dental infection that pt sts she finished on Wednesday. Pt sts she has had 3-4 episodes of diarrhea in the last 24 hours and sts it happens when she eats something. Pt denies any further symptoms.

## 2024-01-10 NOTE — ED NOTES
Patient discharged from ED, given instructions on after-care and information on prescriptions. Patient verbalized understanding, and was ambulatory out of ED with no issues. Discharge papers given to patient.

## 2024-01-10 NOTE — ED PROVIDER NOTES
[facility] EMERGENCY DEPT  EMERGENCY DEPARTMENT ENCOUNTER      Pt Name: Jil Torres  MRN: [id number]  Birthdate 1995  Date of evaluation: 1/10/2024  Provider: Duane Musa MD    CHIEF COMPLAINT       Chief Complaint   Patient presents with    Abdominal Pain    Diarrhea         HISTORY OF PRESENT ILLNESS   (Location/Symptom, Timing/Onset, Context/Setting, Quality, Duration, Modifying Factors, Severity)  Note limiting factors.   20-year-old female presents from home with complaints of diarrhea.  States she completed a course of clindamycin several days ago.  This was for a dental infection.  A couple days later she developed gassy abdominal pain along with diarrhea.  She said she has had 3-4 episodes a day.  No fever, vomiting.  She did have some nausea today.  No other complaints.    The history is provided by the patient.         Review of External Medical Records:     Nursing Notes were reviewed.    REVIEW OF SYSTEMS    (2-9 systems for level 4, 10 or more for level 5)     Review of Systems   Constitutional:  Negative for fatigue.   HENT:  Negative for sore throat.    Eyes:  Negative for visual disturbance.   Respiratory:  Negative for cough.    Cardiovascular:  Negative for palpitations.   Gastrointestinal:  Negative for vomiting.   Genitourinary:  Negative for difficulty urinating.   Musculoskeletal:  Negative for myalgias.   Skin:  Negative for rash.   Neurological:  Negative for weakness.       Except as noted above the remainder of the review of systems was reviewed and negative.       PAST MEDICAL HISTORY     Past Medical History:   Diagnosis Date    Anemia     Murmur     Valvular heart disease          SURGICAL HISTORY       Past Surgical History:   Procedure Laterality Date    [place] UNLISTED PROCEDURE CARDIAC SURGERY           CURRENT MEDICATIONS       Previous Medications    CYCLOBENZAPRINE (FLEXERIL) 5 MG TABLET    Take by mouth 3 times daily as needed    DICLOFENAC (VOLTAREN) 75 MG EC TABLET

## 2024-01-30 ENCOUNTER — TELEPHONE (OUTPATIENT)
Age: 29
End: 2024-01-30

## 2024-01-30 NOTE — TELEPHONE ENCOUNTER
[facility] [place] at [facility]  [phone]    01/30/24 QD Vision message sent to pt to remind her to have labs drawn prior to upcoming appointment with .

## 2024-02-06 NOTE — PROGRESS NOTES
[facility] [place] at [facility]  [address], [address] [facility] [address]  W: [phone]  F: [phone]      Reason for Visit:   Jil Torres is a 28 y.o. female who is seen today for evaluation of iron deficiency anemia.    History of Present Illness:   She was given venofer in March of last year, but only came for 2 of the 5 planned treatments.  She reports feeling better after these infusions, but in recent weeks she has noticed her fatigue returning.  She has also noticed cravings for fish, though not ice, recently.      Review of systems was obtained and pertinent findings reviewed above. Past medical history, social history, family history, medications, and allergies are located in the electronic medical record.    Physical Exam:   There were no vitals taken for this visit.  General: alert, cooperative, no distress   Mental  status: normal mood, behavior, speech, dress, motor activity, and thought processes, able to follow commands   HENT: NCAT   Neck: no visualized mass   Resp: no respiratory distress   Neuro: no gross deficits   Skin: no discoloration or lesions of concern on visible areas   Psychiatric: normal affect, consistent with stated mood, no evidence of hallucinations     Due to this being a TeleHealth evaluation, many elements of the physical examination are unable to be assessed.  Evaluation of the following organ systems was limited: Vitals/Constitutional/EENT/Resp/CV/GI//MS/Neuro/Skin/Heme-Lymph-Imm.      Results:     Lab Results   Component Value Date    WBC 4.3 02/12/2024    HGB 12.4 02/12/2024    HCT 40.0 02/12/2024     02/12/2024    MCV 72 (L) 02/12/2024    NEUTROABS 1.6 02/12/2024     Lab Results   Component Value Date     03/15/2023    K 3.7 03/15/2023     03/15/2023    CO2 27 03/15/2023    GLUCOSE 87 03/15/2023    BUN 8 03/15/2023    CREATININE 0.73 03/15/2023    LABGLOM 128 05/05/2022    CALCIUM 9.1 03/15/2023     Lab Results

## 2024-02-13 LAB
BASOPHILS # BLD AUTO: 0 X10E3/UL (ref 0–0.2)
BASOPHILS NFR BLD AUTO: 1 %
EOSINOPHIL # BLD AUTO: 0.4 X10E3/UL (ref 0–0.4)
EOSINOPHIL NFR BLD AUTO: 9 %
ERYTHROCYTE [DISTWIDTH] IN BLOOD BY AUTOMATED COUNT: 14.3 % (ref 11.7–15.4)
FERRITIN SERPL-MCNC: 92 NG/ML (ref 15–150)
HCT VFR BLD AUTO: 40 % (ref 34–46.6)
HGB BLD-MCNC: 12.4 G/DL (ref 11.1–15.9)
IMM GRANULOCYTES # BLD AUTO: 0 X10E3/UL (ref 0–0.1)
IMM GRANULOCYTES NFR BLD AUTO: 0 %
IRON SATN MFR SERPL: 19 % (ref 15–55)
IRON SERPL-MCNC: 55 UG/DL (ref 27–159)
LYMPHOCYTES # BLD AUTO: 1.9 X10E3/UL (ref 0.7–3.1)
LYMPHOCYTES NFR BLD AUTO: 45 %
MCH RBC QN AUTO: 22.4 PG (ref 26.6–33)
MCHC RBC AUTO-ENTMCNC: 31 G/DL (ref 31.5–35.7)
MCV RBC AUTO: 72 FL (ref 79–97)
MONOCYTES # BLD AUTO: 0.4 X10E3/UL (ref 0.1–0.9)
MONOCYTES NFR BLD AUTO: 9 %
NEUTROPHILS # BLD AUTO: 1.6 X10E3/UL (ref 1.4–7)
NEUTROPHILS NFR BLD AUTO: 36 %
PLATELET # BLD AUTO: 232 X10E3/UL (ref 150–450)
RBC # BLD AUTO: 5.53 X10E6/UL (ref 3.77–5.28)
TIBC SERPL-MCNC: 283 UG/DL (ref 250–450)
UIBC SERPL-MCNC: 228 UG/DL (ref 131–425)
WBC # BLD AUTO: 4.3 X10E3/UL (ref 3.4–10.8)

## 2024-02-14 ENCOUNTER — TELEMEDICINE (OUTPATIENT)
Age: 29
End: 2024-02-14
Payer: COMMERCIAL

## 2024-02-14 DIAGNOSIS — D50.9 IRON DEFICIENCY ANEMIA, UNSPECIFIED IRON DEFICIENCY ANEMIA TYPE: Primary | ICD-10-CM

## 2024-02-14 PROCEDURE — 99214 OFFICE O/P EST MOD 30 MIN: CPT | Performed by: INTERNAL MEDICINE

## 2024-02-14 NOTE — PATIENT INSTRUCTIONS
Thank you for participating in the virtual visit with Dr. Schafer.    We will call you soon to schedule a follow up appointment with us in 1 year.      If you do not hear from us, please call us at [phone] to schedule your appointment.    Please use the enclosed lab slip to have your labs done before your next appointment.

## 2024-02-14 NOTE — PROGRESS NOTES
Jil DYAN Torres is a 28 y.o. female follow up for anemia.    1. Have you been to the ER, urgent care clinic since your last visit?  Hospitalized since your last visit?no    2. Have you seen or consulted any other health care providers outside of the [facility] since your last visit?  Include any pap smears or colon screening. no

## 2024-02-15 ENCOUNTER — TELEPHONE (OUTPATIENT)
Age: 29
End: 2024-02-15

## 2024-02-15 NOTE — TELEPHONE ENCOUNTER
Attempted to call patient to schedule follow up. No answer; left vm     Return in about 1 year (around 2/14/2025) for \"Established Type Visit\", Iron def fu.

## 2025-01-28 ENCOUNTER — TELEPHONE (OUTPATIENT)
Age: 30
End: 2025-01-28

## 2025-01-28 NOTE — TELEPHONE ENCOUNTER
[facility] [place] at [facility]  [phone]    Phone call placed to pt to remind pt to have labs drawn prior to her follow up appointment with . Pt verbalized understanding.

## 2025-02-02 SDOH — ECONOMIC STABILITY: TRANSPORTATION INSECURITY
IN THE PAST 12 MONTHS, HAS LACK OF TRANSPORTATION KEPT YOU FROM MEETINGS, WORK, OR FROM GETTING THINGS NEEDED FOR DAILY LIVING?: NO

## 2025-02-02 SDOH — ECONOMIC STABILITY: INCOME INSECURITY: IN THE LAST 12 MONTHS, WAS THERE A TIME WHEN YOU WERE NOT ABLE TO PAY THE MORTGAGE OR RENT ON TIME?: NO

## 2025-02-02 SDOH — ECONOMIC STABILITY: FOOD INSECURITY: WITHIN THE PAST 12 MONTHS, YOU WORRIED THAT YOUR FOOD WOULD RUN OUT BEFORE YOU GOT MONEY TO BUY MORE.: NEVER TRUE

## 2025-02-02 SDOH — ECONOMIC STABILITY: FOOD INSECURITY: WITHIN THE PAST 12 MONTHS, THE FOOD YOU BOUGHT JUST DIDN'T LAST AND YOU DIDN'T HAVE MONEY TO GET MORE.: NEVER TRUE

## 2025-02-02 SDOH — ECONOMIC STABILITY: TRANSPORTATION INSECURITY
IN THE PAST 12 MONTHS, HAS THE LACK OF TRANSPORTATION KEPT YOU FROM MEDICAL APPOINTMENTS OR FROM GETTING MEDICATIONS?: NO

## 2025-02-02 ASSESSMENT — PATIENT HEALTH QUESTIONNAIRE - PHQ9
6. FEELING BAD ABOUT YOURSELF - OR THAT YOU ARE A FAILURE OR HAVE LET YOURSELF OR YOUR FAMILY DOWN: NOT AT ALL
4. FEELING TIRED OR HAVING LITTLE ENERGY: NOT AT ALL
6. FEELING BAD ABOUT YOURSELF - OR THAT YOU ARE A FAILURE OR HAVE LET YOURSELF OR YOUR FAMILY DOWN: NOT AT ALL
SUM OF ALL RESPONSES TO PHQ QUESTIONS 1-9: 0
5. POOR APPETITE OR OVEREATING: NOT AT ALL
3. TROUBLE FALLING OR STAYING ASLEEP: NOT AT ALL
10. IF YOU CHECKED OFF ANY PROBLEMS, HOW DIFFICULT HAVE THESE PROBLEMS MADE IT FOR YOU TO DO YOUR WORK, TAKE CARE OF THINGS AT HOME, OR GET ALONG WITH OTHER PEOPLE: NOT DIFFICULT AT ALL
2. FEELING DOWN, DEPRESSED OR HOPELESS: NOT AT ALL
8. MOVING OR SPEAKING SO SLOWLY THAT OTHER PEOPLE COULD HAVE NOTICED. OR THE OPPOSITE, BEING SO FIGETY OR RESTLESS THAT YOU HAVE BEEN MOVING AROUND A LOT MORE THAN USUAL: NOT AT ALL
SUM OF ALL RESPONSES TO PHQ9 QUESTIONS 1 & 2: 0
9. THOUGHTS THAT YOU WOULD BE BETTER OFF DEAD, OR OF HURTING YOURSELF: NOT AT ALL
SUM OF ALL RESPONSES TO PHQ QUESTIONS 1-9: 0
3. TROUBLE FALLING OR STAYING ASLEEP: NOT AT ALL
2. FEELING DOWN, DEPRESSED OR HOPELESS: NOT AT ALL
4. FEELING TIRED OR HAVING LITTLE ENERGY: NOT AT ALL
10. IF YOU CHECKED OFF ANY PROBLEMS, HOW DIFFICULT HAVE THESE PROBLEMS MADE IT FOR YOU TO DO YOUR WORK, TAKE CARE OF THINGS AT HOME, OR GET ALONG WITH OTHER PEOPLE: NOT DIFFICULT AT ALL
SUM OF ALL RESPONSES TO PHQ QUESTIONS 1-9: 0
SUM OF ALL RESPONSES TO PHQ QUESTIONS 1-9: 0
7. TROUBLE CONCENTRATING ON THINGS, SUCH AS READING THE NEWSPAPER OR WATCHING TELEVISION: NOT AT ALL
1. LITTLE INTEREST OR PLEASURE IN DOING THINGS: NOT AT ALL
SUM OF ALL RESPONSES TO PHQ QUESTIONS 1-9: 0
5. POOR APPETITE OR OVEREATING: NOT AT ALL
7. TROUBLE CONCENTRATING ON THINGS, SUCH AS READING THE NEWSPAPER OR WATCHING TELEVISION: NOT AT ALL
9. THOUGHTS THAT YOU WOULD BE BETTER OFF DEAD, OR OF HURTING YOURSELF: NOT AT ALL
1. LITTLE INTEREST OR PLEASURE IN DOING THINGS: NOT AT ALL
8. MOVING OR SPEAKING SO SLOWLY THAT OTHER PEOPLE COULD HAVE NOTICED. OR THE OPPOSITE - BEING SO FIDGETY OR RESTLESS THAT YOU HAVE BEEN MOVING AROUND A LOT MORE THAN USUAL: NOT AT ALL

## 2025-02-03 ENCOUNTER — OFFICE VISIT (OUTPATIENT)
Facility: CLINIC | Age: 30
End: 2025-02-03
Payer: COMMERCIAL

## 2025-02-03 VITALS
HEART RATE: 91 BPM | OXYGEN SATURATION: 99 % | BODY MASS INDEX: 27.17 KG/M2 | DIASTOLIC BLOOD PRESSURE: 75 MMHG | SYSTOLIC BLOOD PRESSURE: 115 MMHG | HEIGHT: 61 IN | TEMPERATURE: 98.3 F | WEIGHT: 143.9 LBS

## 2025-02-03 DIAGNOSIS — Z13.220 SCREENING FOR LIPID DISORDERS: ICD-10-CM

## 2025-02-03 DIAGNOSIS — E01.0 THYROMEGALY: ICD-10-CM

## 2025-02-03 DIAGNOSIS — Z13.1 SCREENING FOR DIABETES MELLITUS: ICD-10-CM

## 2025-02-03 DIAGNOSIS — E55.9 VITAMIN D DEFICIENCY, UNSPECIFIED: ICD-10-CM

## 2025-02-03 DIAGNOSIS — Z00.00 ENCOUNTER FOR WELL ADULT EXAM WITHOUT ABNORMAL FINDINGS: Primary | ICD-10-CM

## 2025-02-03 LAB
25(OH)D3 SERPL-MCNC: <9 NG/ML (ref 30–100)
ALBUMIN SERPL-MCNC: 3.5 G/DL (ref 3.5–5)
ALBUMIN/GLOB SERPL: 0.9 (ref 1.1–2.2)
ALP SERPL-CCNC: 60 U/L (ref 45–117)
ALT SERPL-CCNC: 23 U/L (ref 12–78)
ANION GAP SERPL CALC-SCNC: 8 MMOL/L (ref 2–12)
AST SERPL-CCNC: 15 U/L (ref 15–37)
BILIRUB SERPL-MCNC: 0.4 MG/DL (ref 0.2–1)
BUN SERPL-MCNC: 12 MG/DL (ref 6–20)
BUN/CREAT SERPL: 19 (ref 12–20)
CALCIUM SERPL-MCNC: 9.3 MG/DL (ref 8.5–10.1)
CHLORIDE SERPL-SCNC: 108 MMOL/L (ref 97–108)
CHOLEST SERPL-MCNC: 178 MG/DL
CO2 SERPL-SCNC: 20 MMOL/L (ref 21–32)
CREAT SERPL-MCNC: 0.63 MG/DL (ref 0.55–1.02)
EST. AVERAGE GLUCOSE BLD GHB EST-MCNC: 108 MG/DL
GLOBULIN SER CALC-MCNC: 4.1 G/DL (ref 2–4)
GLUCOSE SERPL-MCNC: 84 MG/DL (ref 65–100)
HBA1C MFR BLD: 5.4 % (ref 4–5.6)
HDLC SERPL-MCNC: 68 MG/DL
HDLC SERPL: 2.6 (ref 0–5)
LDLC SERPL CALC-MCNC: 104 MG/DL (ref 0–100)
POTASSIUM SERPL-SCNC: 4.2 MMOL/L (ref 3.5–5.1)
PROT SERPL-MCNC: 7.6 G/DL (ref 6.4–8.2)
SODIUM SERPL-SCNC: 136 MMOL/L (ref 136–145)
T4 FREE SERPL-MCNC: 1.1 NG/DL (ref 0.8–1.5)
TRIGL SERPL-MCNC: 30 MG/DL
TSH SERPL DL<=0.05 MIU/L-ACNC: 0.64 UIU/ML (ref 0.36–3.74)
VLDLC SERPL CALC-MCNC: 6 MG/DL

## 2025-02-03 PROCEDURE — 99395 PREV VISIT EST AGE 18-39: CPT | Performed by: NURSE PRACTITIONER

## 2025-02-03 NOTE — PROGRESS NOTES
Jil Torres is a 29 y.o. female , id x 2(name and ). Reviewed record, history, and  medications.    Pt gives consent for NP student Claudia Glynn to participate in today's appointment.      This patient is accompanied in the office by her pt's girlfriend Zulema.I have received verbal consent from Jil Torres to discuss any/all medical information while they are present in the room.    Chief Complaint   Patient presents with    Annual Exam       Vitals:    25 1013   Height: 1.549 m (5' 1\")       Fasting    Med Review  Reviewed: Medications reviewed changes as follows not taking any meds.  Follow up actions taken notified physician.  Compliance: not compliant on meds  List provided: ptmedlist: no      \"Have you been to the ER, urgent care clinic since your last visit?  Hospitalized since your last visit?\"    NO    “Have you seen or consulted any other health care providers outside of [facility] since your last visit?”    NO     “Have you had a pap smear?”    YES - Where: [facility] Nurse/CMA to request most recent records if not in the chart    No cervical cancer screening on file         2022    11:35 AM   Amb Fall Risk Assessment and TUG Test   Total Score 0         2025     7:06 PM   PHQ-9    Little interest or pleasure in doing things 0   Feeling down, depressed, or hopeless 0   Trouble falling or staying asleep, or sleeping too much 0   Feeling tired or having little energy 0   Poor appetite or overeating 0   Feeling bad about yourself - or that you are a failure or have let yourself or your family down 0   Trouble concentrating on things, such as reading the newspaper or watching television 0   Moving or speaking so slowly that other people could have noticed. Or the opposite - being so fidgety or restless that you have been moving around a lot more than usual 0   Thoughts that you would be better off dead, or of hurting yourself in some way 0   PHQ-2 Score 0   PHQ-9 Total

## 2025-02-03 NOTE — PROGRESS NOTES
Well Adult Note  Name: Jil Torres Today’s Date: 2/3/2025   MRN: [id number] Sex: Female   Age: 29 y.o. Ethnicity: Non- / Non    : 1995 Race: Black /       Jil Torres is here for a well adult exam.       Assessment & Plan   Encounter for well adult exam without abnormal findings      Return in 1 year (on 2/3/2026) for CPE (Physical Exam).       Subjective   History:  Pt presents for PE  Followed by cards for mitral valve and heme for KENDELL      Review of Systems   All other systems reviewed and are negative.      Allergies   Allergen Reactions    Amoxicillin Rash     Skin peeling     Prior to Visit Medications    Medication Sig Taking? Authorizing Provider   ondansetron (ZOFRAN-ODT) 4 MG disintegrating tablet Take 1 tablet by mouth 3 times daily as needed for Nausea or Vomiting  Patient not taking: Reported on 2/3/2025  Duane Musa MD   cyclobenzaprine (FLEXERIL) 5 MG tablet Take by mouth 3 times daily as needed  Patient not taking: Reported on 2/3/2025  Automatic Reconciliation, Ar   diclofenac (VOLTAREN) 75 MG EC tablet Take by mouth 2 times daily  Patient not taking: Reported on 2/3/2025  Automatic Reconciliation, Ar   ergocalciferol (ERGOCALCIFEROL) 1.25 MG (96705 UT) capsule Take by mouth every 7 days  Patient not taking: Reported on 2/3/2025  Automatic Reconciliation, Ar   hydrOXYzine HCl (ATARAX) 25 MG tablet Take by mouth 3 times daily as needed  Patient not taking: Reported on 2/3/2025  Automatic Reconciliation, Ar   mometasone (ELOCON) 0.1 % cream Apply topically daily  Patient not taking: Reported on 2/3/2025  Automatic Reconciliation, Ar   venlafaxine (EFFEXOR XR) 150 MG extended release capsule Take by mouth daily  Patient not taking: Reported on 2/3/2025  Automatic Reconciliation, Ar     Past Medical History:   Diagnosis Date    Anemia     Murmur     Valvular heart disease      Past Surgical History:   Procedure Laterality Date    [place] UNLISTED PROCEDURE

## 2025-02-04 DIAGNOSIS — E55.9 VITAMIN D DEFICIENCY, UNSPECIFIED: Primary | ICD-10-CM

## 2025-02-04 RX ORDER — ERGOCALCIFEROL 1.25 MG/1
50000 CAPSULE, LIQUID FILLED ORAL WEEKLY
Qty: 12 CAPSULE | Refills: 1 | Status: SHIPPED | OUTPATIENT
Start: 2025-02-04

## 2025-02-04 NOTE — PROGRESS NOTES
Cancer [place] at [facility]  [address], [address] [facility] [address]  W: [phone]  F: [phone]      Reason for Visit:   Jil Torres is a 29 y.o. female who is seen today for evaluation of iron deficiency anemia.    History of Present Illness:   She reports feeling well, no new issues since our last visit.  She does note her usual fatigue. No recent pica for ice.    Menses are not has heavy as they had been in the past.      Review of systems was obtained and pertinent findings reviewed above. Past medical history, social history, family history, medications, and allergies are located in the electronic medical record.    Physical Exam:   There were no vitals taken for this visit.  General: alert, cooperative, no distress   Mental  status: normal mood, behavior, speech, dress, motor activity, and thought processes, able to follow commands   HENT: NCAT   Neck: no visualized mass   Resp: no respiratory distress   Neuro: no gross deficits   Skin: no discoloration or lesions of concern on visible areas   Psychiatric: normal affect, consistent with stated mood, no evidence of hallucinations     Due to this being a TeleHealth evaluation, many elements of the physical examination are unable to be assessed.  Evaluation of the following organ systems was limited: Vitals/Constitutional/EENT/Resp/CV/GI//MS/Neuro/Skin/Heme-Lymph-Imm.      Results:     Lab Results   Component Value Date    WBC 5.3 02/06/2025    HGB 12.8 02/06/2025    HCT 42.9 02/06/2025     02/06/2025    MCV 73 (L) 02/06/2025    NEUTROABS 2.3 02/06/2025     Lab Results   Component Value Date     02/03/2025    K 4.2 02/03/2025     02/03/2025    CO2 20 (L) 02/03/2025    GLUCOSE 84 02/03/2025    BUN 12 02/03/2025    CREATININE 0.63 02/03/2025    LABGLOM >90 02/03/2025    CALCIUM 9.3 02/03/2025     Lab Results   Component Value Date    BILITOT 0.4 02/03/2025    ALT 23 02/03/2025    AST 15 02/03/2025

## 2025-02-06 ENCOUNTER — HOSPITAL ENCOUNTER (OUTPATIENT)
Facility: HOSPITAL | Age: 30
Discharge: HOME | End: 2025-02-09
Payer: COMMERCIAL

## 2025-02-06 DIAGNOSIS — E01.0 THYROMEGALY: ICD-10-CM

## 2025-02-06 PROCEDURE — 76536 US EXAM OF HEAD AND NECK: CPT

## 2025-02-07 DIAGNOSIS — D50.9 IRON DEFICIENCY ANEMIA, UNSPECIFIED IRON DEFICIENCY ANEMIA TYPE: ICD-10-CM

## 2025-02-07 LAB
BASOPHILS # BLD AUTO: 0 X10E3/UL (ref 0–0.2)
BASOPHILS NFR BLD AUTO: 1 %
EOSINOPHIL # BLD AUTO: 0.4 X10E3/UL (ref 0–0.4)
EOSINOPHIL NFR BLD AUTO: 7 %
ERYTHROCYTE [DISTWIDTH] IN BLOOD BY AUTOMATED COUNT: 13.6 % (ref 11.7–15.4)
FERRITIN SERPL-MCNC: 33 NG/ML (ref 15–150)
HCT VFR BLD AUTO: 42.9 % (ref 34–46.6)
HGB BLD-MCNC: 12.8 G/DL (ref 11.1–15.9)
IMM GRANULOCYTES # BLD AUTO: 0 X10E3/UL (ref 0–0.1)
IMM GRANULOCYTES NFR BLD AUTO: 0 %
LYMPHOCYTES # BLD AUTO: 2.1 X10E3/UL (ref 0.7–3.1)
LYMPHOCYTES NFR BLD AUTO: 40 %
MCH RBC QN AUTO: 21.8 PG (ref 26.6–33)
MCHC RBC AUTO-ENTMCNC: 29.8 G/DL (ref 31.5–35.7)
MCV RBC AUTO: 73 FL (ref 79–97)
MONOCYTES # BLD AUTO: 0.5 X10E3/UL (ref 0.1–0.9)
MONOCYTES NFR BLD AUTO: 9 %
NEUTROPHILS # BLD AUTO: 2.3 X10E3/UL (ref 1.4–7)
NEUTROPHILS NFR BLD AUTO: 43 %
PLATELET # BLD AUTO: 248 X10E3/UL (ref 150–450)
RBC # BLD AUTO: 5.86 X10E6/UL (ref 3.77–5.28)
WBC # BLD AUTO: 5.3 X10E3/UL (ref 3.4–10.8)

## 2025-02-08 LAB
IRON SATN MFR SERPL: 16 % (ref 15–55)
IRON SERPL-MCNC: 57 UG/DL (ref 27–159)
TIBC SERPL-MCNC: 362 UG/DL (ref 250–450)
UIBC SERPL-MCNC: 305 UG/DL (ref 131–425)

## 2025-02-12 ENCOUNTER — TELEMEDICINE (OUTPATIENT)
Age: 30
End: 2025-02-12
Payer: COMMERCIAL

## 2025-02-12 DIAGNOSIS — D50.9 IRON DEFICIENCY ANEMIA, UNSPECIFIED IRON DEFICIENCY ANEMIA TYPE: Primary | ICD-10-CM

## 2025-02-12 PROCEDURE — 99214 OFFICE O/P EST MOD 30 MIN: CPT | Performed by: INTERNAL MEDICINE

## 2025-02-12 NOTE — PATIENT INSTRUCTIONS
Thank you for participating in the virtual visit with Dr. Schafer.    We will call you soon to schedule a follow up appointment with us in 1 year.      If you do not hear from us, please call us at [phone] to schedule your appointment.    Please use the enclosed lab slip to have your labs done before your next appointment.

## 2025-02-13 ENCOUNTER — TELEPHONE (OUTPATIENT)
Age: 30
End: 2025-02-13

## 2025-03-10 ENCOUNTER — OFFICE VISIT (OUTPATIENT)
Age: 30
End: 2025-03-10
Payer: COMMERCIAL

## 2025-03-10 VITALS
WEIGHT: 150.4 LBS | HEIGHT: 61 IN | SYSTOLIC BLOOD PRESSURE: 115 MMHG | OXYGEN SATURATION: 98 % | BODY MASS INDEX: 28.4 KG/M2 | DIASTOLIC BLOOD PRESSURE: 71 MMHG | HEART RATE: 85 BPM | TEMPERATURE: 98.2 F

## 2025-03-10 DIAGNOSIS — E04.9 THYROID ENLARGEMENT: Primary | ICD-10-CM

## 2025-03-10 PROCEDURE — 99243 OFF/OP CNSLTJ NEW/EST LOW 30: CPT | Performed by: INTERNAL MEDICINE

## 2025-03-10 NOTE — PROGRESS NOTES
Jil Torres (:  1995) is a 29 y.o. female, New patient, here for evaluation of the following chief complaint(s):  New Patient and thyromegaly         Assessment & Plan  1. Thyroid enlargement.  The patient was referred for thyroid enlargement noticed during a routine physical exam. An ultrasound performed on 2025, showed a completely normal thyroid. There are no suspicious findings on the exam, and thyroid blood tests are normal. She has no first-degree relatives with thyroid cancer and no history of radiation exposure to the head or neck. Given these findings, no further action is required at this time.    Results  Laboratory Studies  Thyroid blood test is normal.    Imaging  Ultrasound of thyroid done on 2025 shows no suspicious findings.  1. Thyroid enlargement    No follow-ups on file.       Subjective   History of Present Illness  The patient presents for evaluation of thyroid enlargement.    She was referred to our clinic following the detection of an enlarged thyroid during her annual physical examination. She has not previously consulted with an endocrinologist. A recent ultrasound of her thyroid was performed, but no biopsy has been conducted. She reports no history of radiation exposure to the head or neck, including radioactive iodine. She has never been prescribed any thyroid medications. She is not experiencing any difficulty in swallowing.    FAMILY HISTORY  Her sister and aunt have a history of thyroid cancer.    Review of Systems negative unless listed in HPI.        Objective   Blood pressure 115/71, pulse 85, temperature 98.2 °F (36.8 °C), temperature source Temporal, height 1.549 m (5' 1\"), weight 68.2 kg (150 lb 6.4 oz), SpO2 98%.  Physical Exam  GENERAL: Well-developed, well-nourished female in no acute distress.  HENT: normocephalic, atraumatic  EYES: EOMI. No lid lag, proptosis, icterus, conjunctival injection, periorbital edema.  THYROID: No thyromegaly, no

## 2025-03-10 NOTE — PROGRESS NOTES
Jil Torres is a 29 y.o. female here for   Chief Complaint   Patient presents with    New Patient    thyromegaly       1. Have you been to the ER, urgent care clinic since your last visit?  Hospitalized since your last visit? -N/A    2. Have you seen or consulted any other health care providers outside of the [facility] since your last visit?  Include any pap smears or colon screening.-N/A

## 2025-03-16 ENCOUNTER — HOSPITAL ENCOUNTER (EMERGENCY)
Facility: HOSPITAL | Age: 30
Discharge: HOME | End: 2025-03-16
Attending: FAMILY MEDICINE
Payer: COMMERCIAL

## 2025-03-16 VITALS
OXYGEN SATURATION: 100 % | WEIGHT: 150 LBS | TEMPERATURE: 99.3 F | RESPIRATION RATE: 18 BRPM | DIASTOLIC BLOOD PRESSURE: 78 MMHG | SYSTOLIC BLOOD PRESSURE: 121 MMHG | HEIGHT: 61 IN | BODY MASS INDEX: 28.32 KG/M2 | HEART RATE: 114 BPM

## 2025-03-16 DIAGNOSIS — U07.1 COVID: Primary | ICD-10-CM

## 2025-03-16 LAB
FLUAV RNA SPEC QL NAA+PROBE: NOT DETECTED
FLUBV RNA SPEC QL NAA+PROBE: NOT DETECTED
SARS-COV-2 RNA RESP QL NAA+PROBE: DETECTED

## 2025-03-16 PROCEDURE — 87636 SARSCOV2 & INF A&B AMP PRB: CPT

## 2025-03-16 PROCEDURE — 99283 EMERGENCY DEPT VISIT LOW MDM: CPT

## 2025-03-16 PROCEDURE — 6370000000 HC RX 637 (ALT 250 FOR IP): Performed by: FAMILY MEDICINE

## 2025-03-16 RX ORDER — ACETAMINOPHEN 500 MG
1000 TABLET ORAL
Status: COMPLETED | OUTPATIENT
Start: 2025-03-16 | End: 2025-03-16

## 2025-03-16 RX ORDER — SENNOSIDES 8.6 MG
650 CAPSULE ORAL EVERY 8 HOURS PRN
Qty: 12 TABLET | Refills: 0 | Status: SHIPPED | OUTPATIENT
Start: 2025-03-16 | End: 2025-03-20

## 2025-03-16 RX ORDER — BENZONATATE 100 MG/1
100 CAPSULE ORAL 3 TIMES DAILY PRN
Qty: 15 CAPSULE | Refills: 0 | Status: SHIPPED | OUTPATIENT
Start: 2025-03-16 | End: 2025-03-23

## 2025-03-16 RX ADMIN — ACETAMINOPHEN 1000 MG: 500 TABLET ORAL at 10:18

## 2025-03-16 ASSESSMENT — PAIN SCALES - GENERAL: PAINLEVEL_OUTOF10: 6

## 2025-03-16 NOTE — ED PROVIDER NOTES
EMERGENCY DEPARTMENT HISTORY AND PHYSICAL EXAM      Date: 3/16/2025  Patient Name: Jil Torres    History of Presenting Illness     Chief Complaint   Patient presents with    Influenza type symptoms       HPI: Jil Torres, is a very pleasant 29 y.o. female presenting to the ED with a CC of influenza type symptoms.  Recently developed fever, cough, body aches, chills  Recent onset of symptoms.  No changes in oral intake.  No difficulty breathing.  No alleviating or aggravating factors.         Past History     Past Medical History:  Past Medical History:   Diagnosis Date    Anemia     Murmur     Valvular heart disease        Allergies:  Allergies   Allergen Reactions    Amoxicillin Rash     Skin peeling       Review of Systems     Negative unless otherwise stated by HPI   Physical Exam     Vitals:    03/16/25 1002 03/16/25 1003   BP:  121/78   Pulse:  (!) 114   Resp:  18   Temp:  99.3 °F (37.4 °C)   SpO2:  100%   Weight: 68 kg (150 lb)    Height: 1.549 m (5' 1\")      CONSTITUTIONAL: Alert, in no distress. Appears stated age.,  Nontoxic, well-appearing  HEAD:  Normocephalic, atraumatic  EARS: Bilateral external auditory canals nonerythematous, bilateral tympanic membranes nonbulging and nonerythematous  EYES: EOM intact.  No conjunctival injection or scleral icterus  MOUTH: Posterior oropharynx nonerythematous, no swelling, uvula midline, tolerating secretions with ease  Neck:  Supple. No meningismus,  RESP: No increased work of breathing, lungs clear to auscultation bilaterally.  No wheezes rales nor rhonchi.  No accessory muscle use  CV: Heart is regular rate and rhythm, no murmurs, no JVD, capillary refill less than 2 seconds  NEURO: Moves all extremities spontaneously.  No motor nor sensory deficits.  No focal neurologic deficits.  No neck stiffness nor pain with flexion and extension  PSYCH: Normal mood, normal affect      Medical Decision Making     RADIOLOGY:  Interpretation per the Radiologist

## 2025-03-16 NOTE — DISCHARGE INSTRUCTIONS
Thank you for choosing our Emergency Department for your care.  It is our privilege to care for you in your time of need.  In the next several days, you may receive a survey via email or mailed to your home about your experience with our team.  We would greatly appreciate you taking a few minutes to complete the survey, as we use this information to learn what we have done well and what we could be doing better. Thank you for trusting us with your care!    Below you will find a list of your tests from today's visit.   Labs and Radiology Studies  Recent Results (from the past 12 hours)   COVID-19 & Influenza Combo    Collection Time: 03/16/25 10:20 AM    Specimen: Nasopharyngeal   Result Value Ref Range    SARS-CoV-2, PCR DETECTED (A) Not Detected      Rapid Influenza A By PCR Not Detected Not Detected      Rapid Influenza B By PCR Not Detected Not Detected       No results found.  ------------------------------------------------------------------------------------------------------------  The evaluation and treatment you received in the Emergency Department were for an urgent problem. It is important that you follow-up with a doctor, nurse practitioner, or physician assistant to:  (1) confirm your diagnosis,  (2) re-evaluation of changes in your illness and treatment, and (3) for ongoing care. Please take your discharge instructions with you when you go to your follow-up appointment.     If you have any problem arranging a follow-up appointment, contact us!  If your symptoms become worse or you do not improve as expected, please return to us. We are available 24 hours a day.     If a prescription has been provided, please fill it as soon as possible to prevent a delay in treatment. If you have any questions or reservations about taking the medication due to side effects or interactions with other medications, please call your primary care provider or contact us directly.  Again, THANK YOU for choosing us to care

## 2025-06-15 SDOH — ECONOMIC STABILITY: FOOD INSECURITY: WITHIN THE PAST 12 MONTHS, THE FOOD YOU BOUGHT JUST DIDN'T LAST AND YOU DIDN'T HAVE MONEY TO GET MORE.: NEVER TRUE

## 2025-06-15 SDOH — ECONOMIC STABILITY: INCOME INSECURITY: IN THE LAST 12 MONTHS, WAS THERE A TIME WHEN YOU WERE NOT ABLE TO PAY THE MORTGAGE OR RENT ON TIME?: NO

## 2025-06-15 SDOH — ECONOMIC STABILITY: FOOD INSECURITY: WITHIN THE PAST 12 MONTHS, YOU WORRIED THAT YOUR FOOD WOULD RUN OUT BEFORE YOU GOT MONEY TO BUY MORE.: NEVER TRUE

## 2025-06-15 ASSESSMENT — PATIENT HEALTH QUESTIONNAIRE - PHQ9
6. FEELING BAD ABOUT YOURSELF - OR THAT YOU ARE A FAILURE OR HAVE LET YOURSELF OR YOUR FAMILY DOWN: NOT AT ALL
1. LITTLE INTEREST OR PLEASURE IN DOING THINGS: NOT AT ALL
SUM OF ALL RESPONSES TO PHQ QUESTIONS 1-9: 0
6. FEELING BAD ABOUT YOURSELF - OR THAT YOU ARE A FAILURE OR HAVE LET YOURSELF OR YOUR FAMILY DOWN: NOT AT ALL
2. FEELING DOWN, DEPRESSED OR HOPELESS: NOT AT ALL
8. MOVING OR SPEAKING SO SLOWLY THAT OTHER PEOPLE COULD HAVE NOTICED. OR THE OPPOSITE, BEING SO FIGETY OR RESTLESS THAT YOU HAVE BEEN MOVING AROUND A LOT MORE THAN USUAL: NOT AT ALL
4. FEELING TIRED OR HAVING LITTLE ENERGY: NOT AT ALL
SUM OF ALL RESPONSES TO PHQ QUESTIONS 1-9: 0
SUM OF ALL RESPONSES TO PHQ QUESTIONS 1-9: 0
10. IF YOU CHECKED OFF ANY PROBLEMS, HOW DIFFICULT HAVE THESE PROBLEMS MADE IT FOR YOU TO DO YOUR WORK, TAKE CARE OF THINGS AT HOME, OR GET ALONG WITH OTHER PEOPLE: NOT DIFFICULT AT ALL
9. THOUGHTS THAT YOU WOULD BE BETTER OFF DEAD, OR OF HURTING YOURSELF: NOT AT ALL
10. IF YOU CHECKED OFF ANY PROBLEMS, HOW DIFFICULT HAVE THESE PROBLEMS MADE IT FOR YOU TO DO YOUR WORK, TAKE CARE OF THINGS AT HOME, OR GET ALONG WITH OTHER PEOPLE: NOT DIFFICULT AT ALL
7. TROUBLE CONCENTRATING ON THINGS, SUCH AS READING THE NEWSPAPER OR WATCHING TELEVISION: NOT AT ALL
8. MOVING OR SPEAKING SO SLOWLY THAT OTHER PEOPLE COULD HAVE NOTICED. OR THE OPPOSITE - BEING SO FIDGETY OR RESTLESS THAT YOU HAVE BEEN MOVING AROUND A LOT MORE THAN USUAL: NOT AT ALL
9. THOUGHTS THAT YOU WOULD BE BETTER OFF DEAD, OR OF HURTING YOURSELF: NOT AT ALL
3. TROUBLE FALLING OR STAYING ASLEEP: NOT AT ALL
SUM OF ALL RESPONSES TO PHQ QUESTIONS 1-9: 0
1. LITTLE INTEREST OR PLEASURE IN DOING THINGS: NOT AT ALL
3. TROUBLE FALLING OR STAYING ASLEEP: NOT AT ALL
5. POOR APPETITE OR OVEREATING: NOT AT ALL
SUM OF ALL RESPONSES TO PHQ QUESTIONS 1-9: 0
7. TROUBLE CONCENTRATING ON THINGS, SUCH AS READING THE NEWSPAPER OR WATCHING TELEVISION: NOT AT ALL
2. FEELING DOWN, DEPRESSED OR HOPELESS: NOT AT ALL
4. FEELING TIRED OR HAVING LITTLE ENERGY: NOT AT ALL
5. POOR APPETITE OR OVEREATING: NOT AT ALL

## 2025-06-15 ASSESSMENT — ANXIETY QUESTIONNAIRES
7. FEELING AFRAID AS IF SOMETHING AWFUL MIGHT HAPPEN: NOT AT ALL
1. FEELING NERVOUS, ANXIOUS, OR ON EDGE: NOT AT ALL
3. WORRYING TOO MUCH ABOUT DIFFERENT THINGS: NOT AT ALL
2. NOT BEING ABLE TO STOP OR CONTROL WORRYING: NOT AT ALL
4. TROUBLE RELAXING: NOT AT ALL
6. BECOMING EASILY ANNOYED OR IRRITABLE: NOT AT ALL
6. BECOMING EASILY ANNOYED OR IRRITABLE: NOT AT ALL
2. NOT BEING ABLE TO STOP OR CONTROL WORRYING: NOT AT ALL
5. BEING SO RESTLESS THAT IT IS HARD TO SIT STILL: NOT AT ALL
7. FEELING AFRAID AS IF SOMETHING AWFUL MIGHT HAPPEN: NOT AT ALL
3. WORRYING TOO MUCH ABOUT DIFFERENT THINGS: NOT AT ALL
5. BEING SO RESTLESS THAT IT IS HARD TO SIT STILL: NOT AT ALL
GAD7 TOTAL SCORE: 0
1. FEELING NERVOUS, ANXIOUS, OR ON EDGE: NOT AT ALL
4. TROUBLE RELAXING: NOT AT ALL

## 2025-06-15 ASSESSMENT — LIFESTYLE VARIABLES
HAVE YOU OR SOMEONE ELSE BEEN INJURED AS A RESULT OF YOUR DRINKING: NO
HOW OFTEN DURING THE LAST YEAR HAVE YOU FOUND THAT YOU WERE NOT ABLE TO STOP DRINKING ONCE YOU HAD STARTED: NEVER
HOW OFTEN DURING THE LAST YEAR HAVE YOU NEEDED AN ALCOHOLIC DRINK FIRST THING IN THE MORNING TO GET YOURSELF GOING AFTER A NIGHT OF HEAVY DRINKING: NEVER
HAVE YOU OR SOMEONE ELSE BEEN INJURED AS A RESULT OF YOUR DRINKING: NO
HOW OFTEN DURING THE LAST YEAR HAVE YOU FAILED TO DO WHAT WAS NORMALLY EXPECTED FROM YOU BECAUSE OF DRINKING: NEVER
HOW OFTEN DURING THE LAST YEAR HAVE YOU FAILED TO DO WHAT WAS NORMALLY EXPECTED FROM YOU BECAUSE OF DRINKING: NEVER
HOW OFTEN DURING THE LAST YEAR HAVE YOU BEEN UNABLE TO REMEMBER WHAT HAPPENED THE NIGHT BEFORE BECAUSE YOU HAD BEEN DRINKING: NEVER
HOW OFTEN DURING THE LAST YEAR HAVE YOU NEEDED AN ALCOHOLIC DRINK FIRST THING IN THE MORNING TO GET YOURSELF GOING AFTER A NIGHT OF HEAVY DRINKING: NEVER
HOW OFTEN DURING THE LAST YEAR HAVE YOU HAD A FEELING OF GUILT OR REMORSE AFTER DRINKING: NEVER
HAS A RELATIVE, FRIEND, DOCTOR, OR ANOTHER HEALTH PROFESSIONAL EXPRESSED CONCERN ABOUT YOUR DRINKING OR SUGGESTED YOU CUT DOWN: NO
HOW OFTEN DURING THE LAST YEAR HAVE YOU HAD A FEELING OF GUILT OR REMORSE AFTER DRINKING: NEVER
HOW OFTEN DURING THE LAST YEAR HAVE YOU FOUND THAT YOU WERE NOT ABLE TO STOP DRINKING ONCE YOU HAD STARTED: NEVER
HAS A RELATIVE, FRIEND, DOCTOR, OR ANOTHER HEALTH PROFESSIONAL EXPRESSED CONCERN ABOUT YOUR DRINKING OR SUGGESTED YOU CUT DOWN: NO
HOW OFTEN DURING THE LAST YEAR HAVE YOU BEEN UNABLE TO REMEMBER WHAT HAPPENED THE NIGHT BEFORE BECAUSE YOU HAD BEEN DRINKING: NEVER

## 2025-06-16 ENCOUNTER — OFFICE VISIT (OUTPATIENT)
Facility: CLINIC | Age: 30
End: 2025-06-16
Payer: COMMERCIAL

## 2025-06-16 VITALS
HEART RATE: 93 BPM | HEIGHT: 61 IN | SYSTOLIC BLOOD PRESSURE: 117 MMHG | WEIGHT: 159.3 LBS | BODY MASS INDEX: 30.08 KG/M2 | DIASTOLIC BLOOD PRESSURE: 78 MMHG | TEMPERATURE: 98.1 F | OXYGEN SATURATION: 100 %

## 2025-06-16 DIAGNOSIS — E55.9 VITAMIN D DEFICIENCY, UNSPECIFIED: ICD-10-CM

## 2025-06-16 DIAGNOSIS — E55.9 VITAMIN D DEFICIENCY, UNSPECIFIED: Primary | ICD-10-CM

## 2025-06-16 DIAGNOSIS — R06.2 WHEEZING: ICD-10-CM

## 2025-06-16 LAB — 25(OH)D3 SERPL-MCNC: 47.1 NG/ML (ref 30–100)

## 2025-06-16 PROCEDURE — 99214 OFFICE O/P EST MOD 30 MIN: CPT | Performed by: NURSE PRACTITIONER

## 2025-06-16 RX ORDER — ALBUTEROL SULFATE 90 UG/1
2 INHALANT RESPIRATORY (INHALATION) EVERY 6 HOURS PRN
Qty: 18 G | Refills: 0 | Status: SHIPPED | OUTPATIENT
Start: 2025-06-16

## 2025-06-16 RX ORDER — FLUTICASONE PROPIONATE 110 UG/1
2 AEROSOL, METERED RESPIRATORY (INHALATION) 2 TIMES DAILY
Qty: 12 G | Refills: 1 | Status: SHIPPED | OUTPATIENT
Start: 2025-06-16 | End: 2025-09-14

## 2025-06-16 RX ORDER — PREDNISONE 20 MG/1
40 TABLET ORAL DAILY
Qty: 14 TABLET | Refills: 0 | Status: SHIPPED | OUTPATIENT
Start: 2025-06-16 | End: 2025-06-23

## 2025-06-16 ASSESSMENT — ENCOUNTER SYMPTOMS: WHEEZING: 1

## 2025-06-16 NOTE — PROGRESS NOTES
Progress Note        Lab Collection    F/up on vit D  - has been taking her supplement daily  ·Wheezing  Reports that she has had it since March   She had COVID in March and wheezed while ill  It has continued since, worse at night  Has had persistent wheeze and cough  No problems in the past with wheezing - has a h/o allergies but not asthma or eczema           Wheezing          Subjective:     Patient's medications, allergies, past medical, surgical, social and family histories were reviewed and updated as appropriate.    Review of Systems   Respiratory:  Positive for wheezing.    All other systems reviewed and are negative.       Objective:     Vitals:    06/16/25 0724   BP: 117/78   Pulse: 93   Temp: 98.1 °F (36.7 °C)   SpO2: 100%   Weight: 72.3 kg (159 lb 4.8 oz)   Height: 1.549 m (5' 1\")       Physical Exam  Vitals and nursing note reviewed.   Constitutional:       General: She is not in acute distress.     Appearance: Normal appearance. She is normal weight. She is not ill-appearing, toxic-appearing or diaphoretic.   HENT:      Head: Normocephalic and atraumatic.      Right Ear: Tympanic membrane, ear canal and external ear normal. There is no impacted cerumen.      Left Ear: Tympanic membrane, ear canal and external ear normal. There is no impacted cerumen.      Nose: No congestion.      Mouth/Throat:      Mouth: Mucous membranes are moist.      Pharynx: Oropharynx is clear. No oropharyngeal exudate or posterior oropharyngeal erythema.   Eyes:      Conjunctiva/sclera: Conjunctivae normal.   Cardiovascular:      Rate and Rhythm: Normal rate and regular rhythm.      Pulses: Normal pulses.      Heart sounds: Normal heart sounds.   Pulmonary:      Effort: Pulmonary effort is normal.      Breath sounds: Normal breath sounds.   Musculoskeletal:      Cervical back: Normal range of motion and neck supple. No rigidity or tenderness.   Lymphadenopathy:      Cervical: No cervical adenopathy.   Skin:     General: Skin

## 2025-06-16 NOTE — PROGRESS NOTES
Jil Torres is a 30 y.o. female , id x 2(name and ). Reviewed record, history, and  medications.    Chief Complaint   Patient presents with    Lab Collection     F/up on vit D     Wheezing     Reports that she has had it since March        Vitals:    25 0724   BP: 117/78   Pulse: 93   Temp: 98.1 °F (36.7 °C)   SpO2: 100%   Weight: 72.3 kg (159 lb 4.8 oz)   Height: 1.549 m (5' 1\")       Fasting    Med Review  Reviewed: Medications reviewed no changes.  Compliance: compliant with all meds  List provided: ptmedlist: no      \"Have you been to the ER, urgent care clinic since your last visit?  Hospitalized since your last visit?\"    NO    “Have you seen or consulted any other health care providers outside of [facility] since your last visit?”    NO     “Have you had a pap smear?”    NO    No cervical cancer screening on file         2022    11:35 AM   Amb Fall Risk Assessment and TUG Test   Total Score 0         6/15/2025    10:30 AM   PHQ-9    Little interest or pleasure in doing things 0   Feeling down, depressed, or hopeless 0   Trouble falling or staying asleep, or sleeping too much 0   Feeling tired or having little energy 0   Poor appetite or overeating 0   Feeling bad about yourself - or that you are a failure or have let yourself or your family down 0   Trouble concentrating on things, such as reading the newspaper or watching television 0   Moving or speaking so slowly that other people could have noticed. Or the opposite - being so fidgety or restless that you have been moving around a lot more than usual 0   Thoughts that you would be better off dead, or of hurting yourself in some way 0   PHQ-2 Score 0    PHQ-9 Total Score 0    If you checked off any problems, how difficult have these problems made it for you to do your work, take care of things at home, or get along with other people? 0       Patient-reported         6/15/2025    10:28 AM   TRACEY-7 SCREENING   Feeling nervous,

## 2025-06-17 ENCOUNTER — RESULTS FOLLOW-UP (OUTPATIENT)
Facility: CLINIC | Age: 30
End: 2025-06-17

## 2025-06-26 ENCOUNTER — OFFICE VISIT (OUTPATIENT)
Age: 30
End: 2025-06-26
Payer: COMMERCIAL

## 2025-06-26 VITALS
BODY MASS INDEX: 30.4 KG/M2 | SYSTOLIC BLOOD PRESSURE: 118 MMHG | WEIGHT: 161 LBS | HEIGHT: 61 IN | OXYGEN SATURATION: 99 % | DIASTOLIC BLOOD PRESSURE: 72 MMHG | HEART RATE: 107 BPM

## 2025-06-26 DIAGNOSIS — I34.0 NONRHEUMATIC MITRAL VALVE REGURGITATION: ICD-10-CM

## 2025-06-26 DIAGNOSIS — Z98.890 S/P MVR (MITRAL VALVE REPAIR): Primary | ICD-10-CM

## 2025-06-26 PROCEDURE — 99214 OFFICE O/P EST MOD 30 MIN: CPT

## 2025-06-26 NOTE — PROGRESS NOTES
Chief Complaint   Patient presents with    Other     MVR     Vitals:    06/26/25 1444   BP: 118/72   BP Site: Left Upper Arm   Patient Position: Sitting   Pulse: (!) 107   SpO2: 99%   Weight: 73 kg (161 lb)   Height: 1.549 m (5' 1\")     Chest pain: DENIED     Recent hospital stays: DENIED     Refills: DENIED

## 2025-06-26 NOTE — PROGRESS NOTES
Patient: Jil Torres  : 1995    Primary Cardiologist: Nithin Chow MD. [facility]    Today's Date: 2025    HISTORY OF PRESENT ILLNESS:     History of Present Illness:    Presents today for follow-up. Feels well. Denies chest pain, shortness of breath, edema, palpitations.   Recently had covid, feeling better, however, on albuterol for wheezing after covid. HR slightly elevated today, says it is not typically.       PAST MEDICAL HISTORY:     Past Medical History:   Diagnosis Date    Anemia     Murmur     Valvular heart disease        Past Surgical History:   Procedure Laterality Date    [place] UNLISTED PROCEDURE CARDIAC SURGERY         CURRENT MEDICATIONS:      Current Outpatient Medications   Medication Sig Dispense Refill    fluticasone (FLOVENT HFA) 110 MCG/ACT inhaler Inhale 2 puffs into the lungs 2 times daily 12 g 1    albuterol sulfate HFA (VENTOLIN HFA) 108 (90 Base) MCG/ACT inhaler Inhale 2 puffs into the lungs every 6 hours as needed for Wheezing 18 g 0    acetaminophen (TYLENOL 8 HOUR) 650 MG extended release tablet Take 1 tablet by mouth every 8 hours as needed for Pain 12 tablet 0    vitamin D (ERGOCALCIFEROL) 1.25 MG (36517 UT) CAPS capsule Take 1 capsule by mouth once a week 12 capsule 1     No current facility-administered medications for this visit.       Allergies   Allergen Reactions    Amoxicillin Rash     Skin peeling       SOCIAL HISTORY:     Social History     Tobacco Use    Smoking status: Never    Smokeless tobacco: Never   Vaping Use    Vaping status: Never Used   Substance Use Topics    Alcohol use: No    Drug use: No       FAMILY HISTORY:     Family History   Problem Relation Age of Onset    Cancer Maternal Aunt         LUNG CA - SMOKER    Other Maternal Aunt     Diabetes Maternal Grandmother     Hypertension Maternal Grandmother     Kidney Disease Maternal Grandmother     Kidney Disease Mother         ANKYLOSING SPONDYLITIS    Osteoarthritis Mother     Other Mother

## 2025-07-02 ENCOUNTER — RESULTS FOLLOW-UP (OUTPATIENT)
Age: 30
End: 2025-07-02

## 2025-08-05 RX ORDER — ERGOCALCIFEROL 1.25 MG/1
50000 CAPSULE, LIQUID FILLED ORAL WEEKLY
Qty: 12 CAPSULE | Refills: 1 | Status: SHIPPED | OUTPATIENT
Start: 2025-08-05